# Patient Record
Sex: FEMALE | Race: WHITE | NOT HISPANIC OR LATINO | Employment: OTHER | URBAN - METROPOLITAN AREA
[De-identification: names, ages, dates, MRNs, and addresses within clinical notes are randomized per-mention and may not be internally consistent; named-entity substitution may affect disease eponyms.]

---

## 2021-09-14 ENCOUNTER — OFFICE VISIT (OUTPATIENT)
Dept: FAMILY MEDICINE CLINIC | Facility: CLINIC | Age: 71
End: 2021-09-14

## 2021-09-14 VITALS
BODY MASS INDEX: 30.67 KG/M2 | TEMPERATURE: 97.1 F | WEIGHT: 195.4 LBS | RESPIRATION RATE: 16 BRPM | HEART RATE: 75 BPM | DIASTOLIC BLOOD PRESSURE: 82 MMHG | HEIGHT: 67 IN | OXYGEN SATURATION: 99 % | SYSTOLIC BLOOD PRESSURE: 144 MMHG

## 2021-09-14 DIAGNOSIS — R61 DIAPHORESIS: ICD-10-CM

## 2021-09-14 DIAGNOSIS — R42 VERTIGO: ICD-10-CM

## 2021-09-14 DIAGNOSIS — E89.0 POSTOPERATIVE HYPOTHYROIDISM: Primary | ICD-10-CM

## 2021-09-14 DIAGNOSIS — R00.2 PALPITATIONS: ICD-10-CM

## 2021-09-14 LAB
ALBUMIN SERPL-MCNC: 4.5 G/DL (ref 3.5–5.2)
ALBUMIN/GLOB SERPL: 1.9 G/DL
ALP SERPL-CCNC: 86 U/L (ref 39–117)
ALT SERPL-CCNC: 14 U/L (ref 1–33)
AST SERPL-CCNC: 13 U/L (ref 1–32)
BASOPHILS # BLD AUTO: 0.12 10*3/MM3 (ref 0–0.2)
BASOPHILS NFR BLD AUTO: 1.6 % (ref 0–1.5)
BILIRUB SERPL-MCNC: 0.3 MG/DL (ref 0–1.2)
BUN SERPL-MCNC: 22 MG/DL (ref 8–23)
BUN/CREAT SERPL: 23.2 (ref 7–25)
CALCIUM SERPL-MCNC: 9.2 MG/DL (ref 8.6–10.5)
CHLORIDE SERPL-SCNC: 101 MMOL/L (ref 98–107)
CO2 SERPL-SCNC: 26.3 MMOL/L (ref 22–29)
CREAT SERPL-MCNC: 0.95 MG/DL (ref 0.57–1)
EOSINOPHIL # BLD AUTO: 0.04 10*3/MM3 (ref 0–0.4)
EOSINOPHIL NFR BLD AUTO: 0.5 % (ref 0.3–6.2)
ERYTHROCYTE [DISTWIDTH] IN BLOOD BY AUTOMATED COUNT: 12.2 % (ref 12.3–15.4)
GLOBULIN SER CALC-MCNC: 2.4 GM/DL
GLUCOSE SERPL-MCNC: 90 MG/DL (ref 65–99)
HCT VFR BLD AUTO: 41.5 % (ref 34–46.6)
HGB BLD-MCNC: 13.8 G/DL (ref 12–15.9)
IMM GRANULOCYTES # BLD AUTO: 0.04 10*3/MM3 (ref 0–0.05)
IMM GRANULOCYTES NFR BLD AUTO: 0.5 % (ref 0–0.5)
LYMPHOCYTES # BLD AUTO: 2.06 10*3/MM3 (ref 0.7–3.1)
LYMPHOCYTES NFR BLD AUTO: 27.3 % (ref 19.6–45.3)
MCH RBC QN AUTO: 28.7 PG (ref 26.6–33)
MCHC RBC AUTO-ENTMCNC: 33.3 G/DL (ref 31.5–35.7)
MCV RBC AUTO: 86.3 FL (ref 79–97)
MONOCYTES # BLD AUTO: 0.61 10*3/MM3 (ref 0.1–0.9)
MONOCYTES NFR BLD AUTO: 8.1 % (ref 5–12)
NEUTROPHILS # BLD AUTO: 4.67 10*3/MM3 (ref 1.7–7)
NEUTROPHILS NFR BLD AUTO: 62 % (ref 42.7–76)
NRBC BLD AUTO-RTO: 0 /100 WBC (ref 0–0.2)
PLATELET # BLD AUTO: 323 10*3/MM3 (ref 140–450)
POTASSIUM SERPL-SCNC: 4.5 MMOL/L (ref 3.5–5.2)
PROT SERPL-MCNC: 6.9 G/DL (ref 6–8.5)
RBC # BLD AUTO: 4.81 10*6/MM3 (ref 3.77–5.28)
SODIUM SERPL-SCNC: 140 MMOL/L (ref 136–145)
T4 FREE SERPL-MCNC: 1.78 NG/DL (ref 0.93–1.7)
T4 SERPL-MCNC: 11.3 MCG/DL (ref 4.5–11.7)
TSH SERPL DL<=0.005 MIU/L-ACNC: 0.06 UIU/ML (ref 0.27–4.2)
VIT B12 SERPL-MCNC: 637 PG/ML (ref 211–946)
WBC # BLD AUTO: 7.54 10*3/MM3 (ref 3.4–10.8)

## 2021-09-14 PROCEDURE — 99203 OFFICE O/P NEW LOW 30 MIN: CPT | Performed by: FAMILY MEDICINE

## 2021-09-14 RX ORDER — LEVOTHYROXINE SODIUM 0.05 MG/1
TABLET ORAL
COMMUNITY
Start: 2021-08-10 | End: 2021-09-19 | Stop reason: SDUPTHER

## 2021-09-14 RX ORDER — CITALOPRAM 20 MG/1
20 TABLET ORAL DAILY
COMMUNITY
Start: 2021-08-10 | End: 2021-12-10 | Stop reason: SDUPTHER

## 2021-09-14 RX ORDER — PRAVASTATIN SODIUM 40 MG
40 TABLET ORAL DAILY
COMMUNITY
Start: 2021-08-10 | End: 2021-09-27 | Stop reason: SDUPTHER

## 2021-09-14 RX ORDER — MELOXICAM 15 MG/1
15 TABLET ORAL DAILY
COMMUNITY
Start: 2021-08-16 | End: 2022-03-24 | Stop reason: SDUPTHER

## 2021-09-14 RX ORDER — LATANOPROST 50 UG/ML
1 SOLUTION/ DROPS OPHTHALMIC NIGHTLY
COMMUNITY

## 2021-09-14 RX ORDER — UBIDECARENONE 100 MG
100 CAPSULE ORAL DAILY
COMMUNITY
End: 2022-11-07

## 2021-09-14 NOTE — PROGRESS NOTES
Chief Complaint  Establish Care (NP to Hugo) and Dizziness (thurs started room spinning, has lightheadedness with quick movements)    Subjective          Stefani Watson presents to [facility]  History of Present Illness  About 5 days ago up that morning and said everything spinning around her.   She laid back down and up, went to rest room. Still spinning. Went to bath   Felt better but woozy, not spinning anymore.   Later in the day she reached down and had head rush, startled her and she got up real quick and   Discussed how she was feeling thought she might have been dehydrated. She drank water, gatorade. She says felt better, did electrolytes  Still not herself completely, weak and woozy a little. Not dizzy or spinning anymore.   Also noticed since not feeling as well she has had heart palpitations and when this happens doesn't last long, seconds but at the same time she has sweating in her neck.   Decided to walk yesterday 15 min to get fresh air.  She said tightness in the back of the neck, no pain.   Also has tightness in her sinuses/face. Says not really pain or a headache. She usually walks 30-45 minutes. She walks regularly but hasn't since this started. After the 15 minutes she had the sweating over her back of her neck, was hot outside she said, and tightness in her neck.     Each day she is feeling better. Still just a little weakness, says she can't lift as much or walk as far. She said when she showered had to hold on because she was lightheaded and concerned for falling.  Nothing like this has happened to her before other than when she had a sinus infection. She was told at that time she was out of it. She was evaluted at that time, years ago this episode, and was dx with sinus infection, no hx of vertigo.     Says she had a cold in July. Still coughing a little.   Had stapedectomy done over 20 years ago. She said did well for a long time and then had some trouble with  "hearing. No MRI because of metal in the ear.     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 She misses this here and there. Not so good at it. She does not miss her thyroid medication.   No longer seeing an endocrinologist. Last few years she was seeing . She has been on same dose for years.     Objective   Vital Signs:   /82 (BP Location: Right arm, Patient Position: Sitting, Cuff Size: Adult)   Pulse 75   Temp 97.1 °F (36.2 °C) (Infrared)   Resp 16   Ht 171.2 cm (67.4\")   Wt 88.6 kg (195 lb 6.4 oz)   SpO2 99%   BMI 30.24 kg/m²     Physical Exam  Vitals reviewed.   Constitutional:       General: She is not in acute distress.  Eyes:      General: No scleral icterus.        Right eye: No discharge.         Left eye: No discharge.      Conjunctiva/sclera: Conjunctivae normal.   Cardiovascular:      Rate and Rhythm: Normal rate and regular rhythm.      Heart sounds: Normal heart sounds. No murmur heard.     Pulmonary:      Effort: Pulmonary effort is normal. No respiratory distress.      Breath sounds: Normal breath sounds. No wheezing.   Musculoskeletal:      Cervical back: Neck supple. No muscular tenderness.      Right lower leg: No edema.      Left lower leg: No edema.   Lymphadenopathy:      Cervical: No cervical adenopathy.   Neurological:      General: No focal deficit present.      Mental Status: She is oriented to person, place, and time.      Motor: No abnormal muscle tone.      Comments: Up out of the chair with no difficulty. Required no assistance to get up onto the exam table. No dizziness with this or time of exam.    Psychiatric:         Behavior: Behavior normal.        Result Review :                 Assessment and Plan    Diagnoses and all orders for this visit:    1. Postoperative hypothyroidism (Primary)  -     TSH  -     T4  -     T4, Free    2. Palpitations  -     Holter Monitor - 72 Hour Up To 15 Days; Future  -     Adult Transthoracic Echo Complete W/ Cont " if Necessary Per Protocol; Future  -     Ambulatory Referral to Cardiology  -     CBC & Differential  -     Comprehensive Metabolic Panel  -     Vitamin B12    3. Vertigo  -     Holter Monitor - 72 Hour Up To 15 Days; Future  -     Ambulatory Referral to Cardiology  -     Comprehensive Metabolic Panel  -     Vitamin B12    4. Diaphoresis  -     Holter Monitor - 72 Hour Up To 15 Days; Future  -     Ambulatory Referral to Cardiology  -     TSH  -     Vitamin B12        Follow Up   No follow-ups on file.  Patient was given instructions and counseling regarding her condition or for health maintenance advice. Please see specific information pulled into the AVS if appropriate.     She has new onset what sounds like vertigo, unsure of etiology. She does have some small tympanic effusions and history significant for allergic rhinitis and sinus infections. She is having some sinus issues. No prior hx of vertigo. From her history overall brief episode and does sound positional.       She has her prior labs and reviewed.     With her symptoms I recommend some cardiac evaluation, labs today and referral to cardiology.   She has follow up already set up. Was supposed to be her initial appt with me but then had these symptoms and in here sooner. We will keep this appointment for close follow up.

## 2021-09-19 RX ORDER — LEVOTHYROXINE SODIUM 0.12 MG/1
125 TABLET ORAL
Qty: 90 TABLET | Refills: 0 | Status: SHIPPED | OUTPATIENT
Start: 2021-09-19 | End: 2021-12-15

## 2021-09-27 ENCOUNTER — OFFICE VISIT (OUTPATIENT)
Dept: FAMILY MEDICINE CLINIC | Facility: CLINIC | Age: 71
End: 2021-09-27

## 2021-09-27 VITALS
DIASTOLIC BLOOD PRESSURE: 84 MMHG | HEART RATE: 67 BPM | HEIGHT: 67 IN | TEMPERATURE: 97.5 F | WEIGHT: 200.7 LBS | BODY MASS INDEX: 31.5 KG/M2 | OXYGEN SATURATION: 98 % | SYSTOLIC BLOOD PRESSURE: 134 MMHG | RESPIRATION RATE: 16 BRPM

## 2021-09-27 DIAGNOSIS — E78.00 PURE HYPERCHOLESTEROLEMIA: ICD-10-CM

## 2021-09-27 DIAGNOSIS — E89.0 POSTOPERATIVE HYPOTHYROIDISM: ICD-10-CM

## 2021-09-27 DIAGNOSIS — G89.29 CHRONIC RIGHT-SIDED LOW BACK PAIN WITH RIGHT-SIDED SCIATICA: Primary | ICD-10-CM

## 2021-09-27 DIAGNOSIS — M16.11 PRIMARY OSTEOARTHRITIS OF RIGHT HIP: ICD-10-CM

## 2021-09-27 DIAGNOSIS — M54.41 CHRONIC RIGHT-SIDED LOW BACK PAIN WITH RIGHT-SIDED SCIATICA: Primary | ICD-10-CM

## 2021-09-27 PROBLEM — F32.A DEPRESSIVE DISORDER: Status: ACTIVE | Noted: 2021-09-27

## 2021-09-27 PROBLEM — K57.30 DIVERTICULAR DISEASE OF COLON: Status: ACTIVE | Noted: 2021-09-27

## 2021-09-27 PROBLEM — M19.90 OSTEOARTHRITIS: Status: ACTIVE | Noted: 2021-09-27

## 2021-09-27 PROBLEM — H91.90 HEARING LOSS: Status: ACTIVE | Noted: 2021-09-27

## 2021-09-27 PROBLEM — C73 PRIMARY MALIGNANT NEOPLASM OF THYROID GLAND: Status: ACTIVE | Noted: 2021-09-27

## 2021-09-27 PROCEDURE — 99214 OFFICE O/P EST MOD 30 MIN: CPT | Performed by: FAMILY MEDICINE

## 2021-09-27 RX ORDER — PRAVASTATIN SODIUM 40 MG
40 TABLET ORAL DAILY
Qty: 90 TABLET | Refills: 1 | Status: SHIPPED | OUTPATIENT
Start: 2021-09-27 | End: 2021-11-10 | Stop reason: SDUPTHER

## 2021-09-27 RX ORDER — CALCITRIOL 0.25 UG/1
CAPSULE, LIQUID FILLED ORAL
COMMUNITY
End: 2022-03-24 | Stop reason: SDUPTHER

## 2021-09-27 NOTE — PROGRESS NOTES
"Chief Complaint  Dizziness (f/u)    Subjective          Stefani Watson presents to [facility]  History of Present Illness  She started the lower dose   Not dizzy since. Feeling better.   Still palpitations. Happen at rest or if she gets excited  She still has sinus congestion and using flonase  She is a little back to her walking. Has gone a few days.   One day this week she did a 30 min walk, felt ok.   She has a tightness in her neck when she is walking and has music playing in her ears. Doesn't get that other time.    HLD  She had increase in her dose from 20 to 40 mg in May after her labs. She was double on her 20 and ran out. Did not take for a while. She then got it refilled from old PCP. Has been taking the 40 mg.     Chronic low back pain and hip pain,   Arthritis. Takes mobic, trying not to use regularly but when off has a lot more pain, right side is the worst. Wraps around to the groin and goes down into her right buttock.     Objective   Vital Signs:   /84 (BP Location: Right arm, Patient Position: Sitting, Cuff Size: Adult)   Pulse 67   Temp 97.5 °F (36.4 °C) (Infrared)   Resp 16   Ht 171.2 cm (67.4\")   Wt 91 kg (200 lb 11.2 oz)   SpO2 98%   BMI 31.06 kg/m²     Physical Exam  Vitals reviewed.   Constitutional:       General: She is not in acute distress.  HENT:      Right Ear: Tympanic membrane, ear canal and external ear normal. There is no impacted cerumen.      Left Ear: Tympanic membrane, ear canal and external ear normal. There is no impacted cerumen.      Ears:      Comments: Arching canal on the right. Well healed TM surgical scar posteriorly.   Eyes:      General: No scleral icterus.        Right eye: No discharge.         Left eye: No discharge.      Conjunctiva/sclera: Conjunctivae normal.   Cardiovascular:      Rate and Rhythm: Normal rate and regular rhythm.      Heart sounds: Normal heart sounds. No murmur heard.        Comments: Premature beats x 2 " heard during auscultation, most RRR.  Pulmonary:      Effort: Pulmonary effort is normal. No respiratory distress.      Breath sounds: Normal breath sounds. No wheezing.   Musculoskeletal:      Cervical back: Neck supple. No tenderness. No muscular tenderness.      Right lower leg: No edema.      Left lower leg: No edema.      Comments: She does have spasm palpable over superior trapezius right greater than left. No tenderness on the neck.    Lymphadenopathy:      Cervical: No cervical adenopathy.   Neurological:      Mental Status: She is oriented to person, place, and time.      Motor: No abnormal muscle tone.   Psychiatric:         Behavior: Behavior normal.        Result Review :                 Assessment and Plan    Diagnoses and all orders for this visit:    1. Chronic right-sided low back pain with right-sided sciatica (Primary)  -     Ambulatory Referral to Physical Therapy Evaluate and treat    2. Primary osteoarthritis of right hip  -     Ambulatory Referral to Physical Therapy Evaluate and treat    3. Pure hypercholesterolemia    4. Postoperative hypothyroidism  -     TSH; Future  -     T4; Future  -     T4, Free; Future    Other orders  -     pravastatin (PRAVACHOL) 40 MG tablet; Take 1 tablet by mouth Daily.  Dispense: 90 tablet; Refill: 1        Follow Up   No follow-ups on file.  Patient was given instructions and counseling regarding her condition or for health maintenance advice. Please see specific information pulled into the AVS if appropriate.     She would like to do PT for her back and hips. She had been doing before moved and she is less active than she had been since moving here. She had walking friend in [place] and walked sometimes an hour per day. She is doing 3 times per week 20 min. Outside.   She has pain when she stops her meloxicam.   She tries not to take it all the time related to kidney concerns.     She did have change in her pravastatin dose. Was off for short period, on her higher  dose for less than one month. Discussed we will refill and then have her back in a few months to check. Her last LDL was 166.     Having palpitations intermittently at rest. No associated symptoms except sometimes nervous.   She is doing well with her walking, not as often and not as long but walking again. She is set for echo and holter 9/30 and f/u with cards early October    She did change to the lower levothyroxine dose 1 week ago. We will check labs in three weeks.

## 2021-09-29 ENCOUNTER — TELEPHONE (OUTPATIENT)
Dept: FAMILY MEDICINE CLINIC | Facility: CLINIC | Age: 71
End: 2021-09-29

## 2021-10-11 DIAGNOSIS — E89.0 POSTOPERATIVE HYPOTHYROIDISM: ICD-10-CM

## 2021-10-19 ENCOUNTER — HOSPITAL ENCOUNTER (OUTPATIENT)
Dept: CARDIOLOGY | Facility: HOSPITAL | Age: 71
Discharge: HOME | End: 2021-10-19
Admitting: FAMILY MEDICINE

## 2021-10-19 VITALS
DIASTOLIC BLOOD PRESSURE: 80 MMHG | SYSTOLIC BLOOD PRESSURE: 152 MMHG | HEART RATE: 58 BPM | WEIGHT: 200 LBS | BODY MASS INDEX: 31.39 KG/M2 | HEIGHT: 67 IN

## 2021-10-19 DIAGNOSIS — R00.2 PALPITATIONS: ICD-10-CM

## 2021-10-19 LAB
T4 FREE SERPL-MCNC: 1.67 NG/DL (ref 0.82–1.77)
T4 SERPL-MCNC: 10.2 UG/DL (ref 4.5–12)
TSH SERPL DL<=0.005 MIU/L-ACNC: 0.08 UIU/ML (ref 0.45–4.5)

## 2021-10-19 PROCEDURE — 93306 TTE W/DOPPLER COMPLETE: CPT | Performed by: INTERNAL MEDICINE

## 2021-10-19 PROCEDURE — 93306 TTE W/DOPPLER COMPLETE: CPT

## 2021-10-20 LAB
AORTIC ARCH: 2.5 CM
ASCENDING AORTA: 2.9 CM
BH CV ECHO MEAS - ACS: 2.1 CM
BH CV ECHO MEAS - AO MAX PG (FULL): 0.28 MMHG
BH CV ECHO MEAS - AO MAX PG: 5.6 MMHG
BH CV ECHO MEAS - AO MEAN PG (FULL): 1 MMHG
BH CV ECHO MEAS - AO MEAN PG: 3 MMHG
BH CV ECHO MEAS - AO ROOT AREA (BSA CORRECTED): 1.6
BH CV ECHO MEAS - AO ROOT AREA: 8.6 CM^2
BH CV ECHO MEAS - AO ROOT DIAM: 3.3 CM
BH CV ECHO MEAS - AO V2 MAX: 118 CM/SEC
BH CV ECHO MEAS - AO V2 MEAN: 81.1 CM/SEC
BH CV ECHO MEAS - AO V2 VTI: 27 CM
BH CV ECHO MEAS - ASC AORTA: 2.9 CM
BH CV ECHO MEAS - AVA(I,A): 2.3 CM^2
BH CV ECHO MEAS - AVA(I,D): 2.3 CM^2
BH CV ECHO MEAS - AVA(V,A): 2.8 CM^2
BH CV ECHO MEAS - AVA(V,D): 2.8 CM^2
BH CV ECHO MEAS - BSA(HAYCOCK): 2.1 M^2
BH CV ECHO MEAS - BSA: 2 M^2
BH CV ECHO MEAS - BZI_BMI: 31.3 KILOGRAMS/M^2
BH CV ECHO MEAS - BZI_METRIC_HEIGHT: 170.2 CM
BH CV ECHO MEAS - BZI_METRIC_WEIGHT: 90.7 KG
BH CV ECHO MEAS - EDV(CUBED): 46.7 ML
BH CV ECHO MEAS - EDV(MOD-SP2): 60 ML
BH CV ECHO MEAS - EDV(MOD-SP4): 54 ML
BH CV ECHO MEAS - EDV(TEICH): 54.4 ML
BH CV ECHO MEAS - EF(CUBED): 66.5 %
BH CV ECHO MEAS - EF(MOD-BP): 61.8 %
BH CV ECHO MEAS - EF(MOD-SP2): 68.3 %
BH CV ECHO MEAS - EF(MOD-SP4): 61.1 %
BH CV ECHO MEAS - EF(TEICH): 59 %
BH CV ECHO MEAS - ESV(CUBED): 15.6 ML
BH CV ECHO MEAS - ESV(MOD-SP2): 19 ML
BH CV ECHO MEAS - ESV(MOD-SP4): 21 ML
BH CV ECHO MEAS - ESV(TEICH): 22.3 ML
BH CV ECHO MEAS - FS: 30.6 %
BH CV ECHO MEAS - IVS/LVPW: 1.2
BH CV ECHO MEAS - IVSD: 1.3 CM
BH CV ECHO MEAS - LAT PEAK E' VEL: 7.5 CM/SEC
BH CV ECHO MEAS - LV DIASTOLIC VOL/BSA (35-75): 26.7 ML/M^2
BH CV ECHO MEAS - LV MASS(C)D: 141.5 GRAMS
BH CV ECHO MEAS - LV MASS(C)DI: 70 GRAMS/M^2
BH CV ECHO MEAS - LV MAX PG: 5.3 MMHG
BH CV ECHO MEAS - LV MEAN PG: 2 MMHG
BH CV ECHO MEAS - LV SYSTOLIC VOL/BSA (12-30): 10.4 ML/M^2
BH CV ECHO MEAS - LV V1 MAX: 115 CM/SEC
BH CV ECHO MEAS - LV V1 MEAN: 65.3 CM/SEC
BH CV ECHO MEAS - LV V1 VTI: 21.8 CM
BH CV ECHO MEAS - LVIDD: 3.6 CM
BH CV ECHO MEAS - LVIDS: 2.5 CM
BH CV ECHO MEAS - LVLD AP2: 6.7 CM
BH CV ECHO MEAS - LVLD AP4: 6.6 CM
BH CV ECHO MEAS - LVLS AP2: 4.7 CM
BH CV ECHO MEAS - LVLS AP4: 5.6 CM
BH CV ECHO MEAS - LVOT AREA (M): 2.8 CM^2
BH CV ECHO MEAS - LVOT AREA: 2.8 CM^2
BH CV ECHO MEAS - LVOT DIAM: 1.9 CM
BH CV ECHO MEAS - LVPWD: 1.1 CM
BH CV ECHO MEAS - MED PEAK E' VEL: 6.2 CM/SEC
BH CV ECHO MEAS - MV A DUR: 0.11 SEC
BH CV ECHO MEAS - MV A MAX VEL: 94.3 CM/SEC
BH CV ECHO MEAS - MV DEC SLOPE: 260 CM/SEC^2
BH CV ECHO MEAS - MV DEC TIME: 0.23 SEC
BH CV ECHO MEAS - MV E MAX VEL: 52.6 CM/SEC
BH CV ECHO MEAS - MV E/A: 0.56
BH CV ECHO MEAS - MV MAX PG: 3.5 MMHG
BH CV ECHO MEAS - MV MEAN PG: 1 MMHG
BH CV ECHO MEAS - MV P1/2T MAX VEL: 63.4 CM/SEC
BH CV ECHO MEAS - MV P1/2T: 71.4 MSEC
BH CV ECHO MEAS - MV V2 MAX: 94 CM/SEC
BH CV ECHO MEAS - MV V2 MEAN: 45.9 CM/SEC
BH CV ECHO MEAS - MV V2 VTI: 22.3 CM
BH CV ECHO MEAS - MVA P1/2T LCG: 3.5 CM^2
BH CV ECHO MEAS - MVA(P1/2T): 3.1 CM^2
BH CV ECHO MEAS - MVA(VTI): 2.8 CM^2
BH CV ECHO MEAS - PA ACC TIME: 0.15 SEC
BH CV ECHO MEAS - PA MAX PG (FULL): 0.98 MMHG
BH CV ECHO MEAS - PA MAX PG: 2.7 MMHG
BH CV ECHO MEAS - PA PR(ACCEL): 11.1 MMHG
BH CV ECHO MEAS - PA V2 MAX: 81.4 CM/SEC
BH CV ECHO MEAS - PULM A REVS DUR: 0.14 SEC
BH CV ECHO MEAS - PULM A REVS VEL: 36 CM/SEC
BH CV ECHO MEAS - PULM DIAS VEL: 34.4 CM/SEC
BH CV ECHO MEAS - PULM S/D: 1.4
BH CV ECHO MEAS - PULM SYS VEL: 48.8 CM/SEC
BH CV ECHO MEAS - RAP SYSTOLE: 3 MMHG
BH CV ECHO MEAS - RV MAX PG: 1.7 MMHG
BH CV ECHO MEAS - RV MEAN PG: 1 MMHG
BH CV ECHO MEAS - RV V1 MAX: 64.6 CM/SEC
BH CV ECHO MEAS - RV V1 MEAN: 45.2 CM/SEC
BH CV ECHO MEAS - RV V1 VTI: 16.4 CM
BH CV ECHO MEAS - RVSP: 19 MMHG
BH CV ECHO MEAS - SI(AO): 114.2 ML/M^2
BH CV ECHO MEAS - SI(CUBED): 15.3 ML/M^2
BH CV ECHO MEAS - SI(LVOT): 30.6 ML/M^2
BH CV ECHO MEAS - SI(MOD-SP2): 20.3 ML/M^2
BH CV ECHO MEAS - SI(MOD-SP4): 16.3 ML/M^2
BH CV ECHO MEAS - SI(TEICH): 15.9 ML/M^2
BH CV ECHO MEAS - SUP REN AO DIAM: 2.1 CM
BH CV ECHO MEAS - SV(AO): 230.9 ML
BH CV ECHO MEAS - SV(CUBED): 31 ML
BH CV ECHO MEAS - SV(LVOT): 61.8 ML
BH CV ECHO MEAS - SV(MOD-SP2): 41 ML
BH CV ECHO MEAS - SV(MOD-SP4): 33 ML
BH CV ECHO MEAS - SV(TEICH): 32.1 ML
BH CV ECHO MEAS - TAPSE (>1.6): 1.7 CM
BH CV ECHO MEAS - TR MAX VEL: 198 CM/SEC
BH CV ECHO MEASUREMENTS AVERAGE E/E' RATIO: 7.68
BH CV XLRA - RV BASE: 2.5 CM
BH CV XLRA - RV LENGTH: 4.9 CM
BH CV XLRA - RV MID: 2.5 CM
BH CV XLRA - TDI S': 7.8 CM/SEC
LEFT ATRIUM VOLUME INDEX: 26 ML/M2
LV EF 2D ECHO EST: 62 %
MAXIMAL PREDICTED HEART RATE: 147 BPM
SINUS: 3 CM
STJ: 2 CM
STRESS TARGET HR: 125 BPM

## 2021-11-03 ENCOUNTER — OFFICE VISIT (OUTPATIENT)
Dept: CARDIOLOGY | Facility: CLINIC | Age: 71
End: 2021-11-03

## 2021-11-03 VITALS
SYSTOLIC BLOOD PRESSURE: 142 MMHG | BODY MASS INDEX: 31.89 KG/M2 | HEIGHT: 67 IN | DIASTOLIC BLOOD PRESSURE: 70 MMHG | WEIGHT: 203.2 LBS | HEART RATE: 68 BPM

## 2021-11-03 DIAGNOSIS — E78.00 PURE HYPERCHOLESTEROLEMIA: ICD-10-CM

## 2021-11-03 DIAGNOSIS — R00.2 PALPITATIONS: ICD-10-CM

## 2021-11-03 DIAGNOSIS — R42 DIZZINESS: Primary | ICD-10-CM

## 2021-11-03 DIAGNOSIS — E89.0 POSTOPERATIVE HYPOTHYROIDISM: ICD-10-CM

## 2021-11-03 PROCEDURE — 93000 ELECTROCARDIOGRAM COMPLETE: CPT | Performed by: INTERNAL MEDICINE

## 2021-11-03 PROCEDURE — 99203 OFFICE O/P NEW LOW 30 MIN: CPT | Performed by: INTERNAL MEDICINE

## 2021-11-03 NOTE — PROGRESS NOTES
Date of Office Visit: 2021  Encounter Provider: Lorenza Finney MD  Place of Service: [facility]  Patient Name: Stefani Watson  :1950      Patient ID:  Stefani Watson is a 71 y.o. female is here for palpitations and dizziness.      History of Present Illness    She has a history of thyroid cancer treated by [id number] resulting in hypothyroidism for which she is on thyroid replacement.  She has a history of hyperlipidemia on pravastatin.  She is here today for dizziness and palpitations.    She has a family history of heart disease with her mother and her sister had cancer.    She is , has 1 child, is retired, has never smoked, has 3 to 4 cups of coffee per day and occasional alcohol.  She moved here from [place] in May 2021, she was about 1 hour north of [place].    Labs done 2021 show unremarkable CMP, normal CBC.  She is had a abnormal thyroid panel done twice, once on what 2021 and second time on 10/18/2021.  On 10/18/2021, her TSH was low at 0.083 with a total T4 that is normal and a normal free T4.  She had a normal echocardiogram done 10/19/2021.  She wore 2-week Holter monitor which got turned in on 2021, I do not have the results of that.    She describes a spinning sensation that happened right around 2021.  She woke in the morning and the room was spinning.  She then noticed that her heart was also palpitating and she was having short bursts of a fast heart rhythm.  The same day that she first noticed this, she went to her kitchen and she was feeling better and bent over and the spinning sensation came back and she nearly passed out.  That is when she was seen.  At that time to discover that her thyroid was overtreated and her medication was changed and she is felt much better since then.  She exercises walking about a mile daily and feels well with that.  She sometimes gets some pain in the back of her neck with walking  but is not consistent.  She occasionally gets some left-sided chest heaviness but it is very random and does not occur very often.  She has had no orthopnea or PND.  She has no exertional dyspnea.  She does have some aches and pains with arthritis but overall tolerates her pravastatin well.  She is not had any check of her lipids since she moved from [place].  He does all own activities of daily living including laundry, cooking, cleaning, driving.    Past Medical History:   Diagnosis Date   • HL (hearing loss)    • Hyperlipidemia    • Hypothyroidism     Thyroid removed   • Thyroid cancer (HCC)          Past Surgical History:   Procedure Laterality Date   • CATARACT EXTRACTION, BILATERAL     • EYE SURGERY      Cataracts   • LAPAROSCOPIC TUBAL LIGATION     • ROTATOR CUFF REPAIR Left    • STAPEDECTOMY Right    • THYROIDECTOMY     • TUBAL ABDOMINAL LIGATION         Current Outpatient Medications on File Prior to Visit   Medication Sig Dispense Refill   • calcitriol (ROCALTROL) 0.25 MCG capsule calcitriol 0.25 mcg capsule     • citalopram (CeleXA) 20 MG tablet Take 20 mg by mouth Daily.     • coenzyme Q10 100 MG capsule Take 100 mg by mouth Daily.     • latanoprost (XALATAN) 0.005 % ophthalmic solution USE 1 DROP IN BOTH EYES AT BEDTIME     • levothyroxine (SYNTHROID, LEVOTHROID) 125 MCG tablet Take 1 tablet by mouth Every Morning. 90 tablet 0   • MAGNESIUM OXIDE PO Take 64 mg by mouth Daily. Takes 4 tablets daily     • meloxicam (MOBIC) 15 MG tablet Take 15 mg by mouth Daily. with food     • pravastatin (PRAVACHOL) 40 MG tablet Take 1 tablet by mouth Daily. 90 tablet 1     No current facility-administered medications on file prior to visit.       Social History     Socioeconomic History   • Marital status:    Tobacco Use   • Smoking status: Never Smoker   • Smokeless tobacco: Never Used   Substance and Sexual Activity   • Alcohol use: Yes     Alcohol/week: 0.0 standard drinks     Comment: occ//Caffeine use: 3-4  "cups daily    • Drug use: Never   • Sexual activity: Not Currently           ROS    Procedures    ECG 12 Lead    Date/Time: 11/3/2021 9:22 AM  Performed by: Lorenza Finney MD  Authorized by: Lorenza Finney MD   Comparison: compared with previous ECG   Similar to previous ECG  Rhythm: sinus rhythm    Clinical impression: normal ECG                Objective:      Vitals:    11/03/21 0905 11/03/21 0908   BP: 136/70 142/70   BP Location: Left arm Right arm   Pulse: 68    Weight: 92.2 kg (203 lb 3.2 oz)    Height: 170.2 cm (67\")      Body mass index is 31.83 kg/m².    Vitals reviewed.   Constitutional:       General: Not in acute distress.     Appearance: Well-developed. Not diaphoretic.   Eyes:      General: No scleral icterus.     Conjunctiva/sclera: Conjunctivae normal.   HENT:      Head: Normocephalic and atraumatic.   Neck:      Thyroid: No thyromegaly.      Vascular: No carotid bruit or JVD.      Lymphadenopathy: No cervical adenopathy.   Pulmonary:      Effort: Pulmonary effort is normal. No respiratory distress.      Breath sounds: Normal breath sounds. No wheezing. No rhonchi. No rales.   Chest:      Chest wall: Not tender to palpatation.   Cardiovascular:      Normal rate. Regular rhythm.      Murmurs: There is no murmur.      No gallop.   Pulses:     Intact distal pulses.   Edema:     Peripheral edema absent.   Abdominal:      General: Bowel sounds are normal. There is no distension or abdominal bruit.      Palpations: Abdomen is soft. There is no abdominal mass.      Tenderness: There is no abdominal tenderness.   Musculoskeletal:         General: No deformity.      Extremities: No clubbing present.     Cervical back: Neck supple. Skin:     General: Skin is warm and dry. There is no cyanosis.      Coloration: Skin is not pale.      Findings: No rash.   Neurological:      Mental Status: Alert and oriented to person, place, and time.      Cranial Nerves: No cranial nerve deficit.   Psychiatric:    "      Judgment: Judgment normal.         Lab Review:       Assessment:      Diagnosis Plan   1. Dizziness     2. Pure hypercholesterolemia     3. Postoperative hypothyroidism     4. Palpitations       1. Palpitations and short bursts of tachycardia, await monitor.  Echo was normal and EKG today is also normal.  Did get better when her thyroid dosing was adjusted.  2. Hyperlipidemia, on pravastatin, needs her lipids rechecked  3. History of thyroid cancer, status post iodine 131 treatment.  On thyroid replacement     Plan:       Advised vascular screening, no other changes to medications made, advised to get her lipids checked.  No other testing needed at this time.  If her monitor looks normal, she will not need follow-up care.  Overall I think she is doing well.

## 2021-11-10 NOTE — TELEPHONE ENCOUNTER
Caller: Stefani Watson    Relationship: Self    Best call back number: [phone] (H)    Requested Prescriptions:   Requested Prescriptions     Pending Prescriptions Disp Refills   • pravastatin (PRAVACHOL) 40 MG tablet 90 tablet 1     Sig: Take 1 tablet by mouth Daily.        Pharmacy where request should be sent: [facility] #[id number] [address] AT [facility] [phone] [facility] [phone]      Additional details provided by patient: PATIENT STATES WOULD LIKE TO RECEIVE A 90 DAY SUPPLY    Does the patient have less than a 3 day supply:  [] Yes  [x] No    Patito Pickens Rep   11/10/21 11:48 EST

## 2021-11-12 RX ORDER — PRAVASTATIN SODIUM 40 MG
40 TABLET ORAL DAILY
Qty: 90 TABLET | Refills: 3 | Status: SHIPPED | OUTPATIENT
Start: 2021-11-12 | End: 2022-11-01

## 2021-11-12 NOTE — TELEPHONE ENCOUNTER
Rx Refill Note  Requested Prescriptions     Pending Prescriptions Disp Refills   • pravastatin (PRAVACHOL) 40 MG tablet 90 tablet 1     Sig: Take 1 tablet by mouth Daily.      Last office visit with prescribing clinician: 9/27/2021      Next office visit with prescribing clinician: 1/28/2022            Maryan Aguirre LPN  11/12/21, 07:46 EST

## 2021-12-10 ENCOUNTER — TELEPHONE (OUTPATIENT)
Dept: FAMILY MEDICINE CLINIC | Facility: CLINIC | Age: 71
End: 2021-12-10

## 2021-12-10 NOTE — TELEPHONE ENCOUNTER
.    Caller: Shirley Stefani    Relationship: Self    Best call back number: [phone]    Requested Prescriptions:   Requested Prescriptions     Pending Prescriptions Disp Refills   • citalopram (CeleXA) 20 MG tablet       Sig: Take 1 tablet by mouth Daily.        Pharmacy where request should be sent:    [facility] #[id number] [facility] [id number] [place] [address] AT [facility] - [phone] [place] [phone] FX  [phone]    Does the patient have less than a 3 day supply:  [] Yes  [x] No    Patito Catherine Rep   12/10/21 13:20 EST

## 2021-12-11 RX ORDER — CITALOPRAM 20 MG/1
20 TABLET ORAL DAILY
Qty: 90 TABLET | Refills: 1 | Status: SHIPPED | OUTPATIENT
Start: 2021-12-11 | End: 2022-06-03

## 2021-12-15 RX ORDER — LEVOTHYROXINE SODIUM 0.12 MG/1
125 TABLET ORAL
Qty: 90 TABLET | Refills: 1 | Status: SHIPPED | OUTPATIENT
Start: 2021-12-15 | End: 2022-06-08

## 2021-12-15 NOTE — TELEPHONE ENCOUNTER
Rx Refill Note  Requested Prescriptions     Pending Prescriptions Disp Refills   • levothyroxine (SYNTHROID, LEVOTHROID) 125 MCG tablet [Pharmacy Med Name: LEVOTHYROXINE 0.125MG (125MCG) TAB] 90 tablet 0     Sig: TAKE 1 TABLET BY MOUTH EVERY MORNING      Last office visit with prescribing clinician: 9/27/2021      Next office visit with prescribing clinician: 1/28/2022            Maryan Aguirre LPN  12/14/21, 19:00 EST

## 2022-01-28 ENCOUNTER — OFFICE VISIT (OUTPATIENT)
Dept: FAMILY MEDICINE CLINIC | Facility: CLINIC | Age: 72
End: 2022-01-28

## 2022-01-28 VITALS
SYSTOLIC BLOOD PRESSURE: 132 MMHG | TEMPERATURE: 97.7 F | HEART RATE: 77 BPM | HEIGHT: 67 IN | DIASTOLIC BLOOD PRESSURE: 72 MMHG | OXYGEN SATURATION: 99 % | WEIGHT: 206 LBS | RESPIRATION RATE: 18 BRPM | BODY MASS INDEX: 32.33 KG/M2

## 2022-01-28 DIAGNOSIS — Z12.31 ENCOUNTER FOR SCREENING MAMMOGRAM FOR MALIGNANT NEOPLASM OF BREAST: ICD-10-CM

## 2022-01-28 DIAGNOSIS — N83.201 CYST OF RIGHT OVARY: ICD-10-CM

## 2022-01-28 DIAGNOSIS — Z09 ENCOUNTER FOR FOLLOW-UP EXAMINATION AFTER COMPLETED TREATMENT FOR CONDITIONS OTHER THAN MALIGNANT NEOPLASM: ICD-10-CM

## 2022-01-28 DIAGNOSIS — E89.0 POSTOPERATIVE HYPOTHYROIDISM: ICD-10-CM

## 2022-01-28 DIAGNOSIS — E78.00 PURE HYPERCHOLESTEROLEMIA: Primary | ICD-10-CM

## 2022-01-28 PROCEDURE — 99214 OFFICE O/P EST MOD 30 MIN: CPT | Performed by: FAMILY MEDICINE

## 2022-01-28 RX ORDER — AMOXICILLIN 875 MG/1
TABLET, COATED ORAL
COMMUNITY
Start: 2022-01-26 | End: 2022-04-20

## 2022-01-29 LAB
BUN SERPL-MCNC: 13 MG/DL (ref 8–27)
BUN/CREAT SERPL: 14 (ref 12–28)
CALCIUM SERPL-MCNC: 7.9 MG/DL (ref 8.7–10.3)
CHLORIDE SERPL-SCNC: 104 MMOL/L (ref 96–106)
CHOLEST SERPL-MCNC: 192 MG/DL (ref 100–199)
CO2 SERPL-SCNC: 22 MMOL/L (ref 20–29)
CREAT SERPL-MCNC: 0.93 MG/DL (ref 0.57–1)
GLUCOSE SERPL-MCNC: 86 MG/DL (ref 65–99)
HDLC SERPL-MCNC: 43 MG/DL
LDLC SERPL CALC-MCNC: 133 MG/DL (ref 0–99)
POTASSIUM SERPL-SCNC: 4.2 MMOL/L (ref 3.5–5.2)
SODIUM SERPL-SCNC: 142 MMOL/L (ref 134–144)
T4 FREE SERPL-MCNC: 1.81 NG/DL (ref 0.82–1.77)
T4 SERPL-MCNC: 10.6 UG/DL (ref 4.5–12)
TRIGL SERPL-MCNC: 85 MG/DL (ref 0–149)
TSH SERPL-ACNC: 0.11 UIU/ML (ref 0.45–4.5)
VLDLC SERPL CALC-MCNC: 16 MG/DL (ref 5–40)

## 2022-02-01 ENCOUNTER — TELEPHONE (OUTPATIENT)
Dept: FAMILY MEDICINE CLINIC | Facility: CLINIC | Age: 72
End: 2022-02-01

## 2022-02-01 DIAGNOSIS — M54.41 CHRONIC RIGHT-SIDED LOW BACK PAIN WITH RIGHT-SIDED SCIATICA: Primary | ICD-10-CM

## 2022-02-01 DIAGNOSIS — M16.11 PRIMARY OSTEOARTHRITIS OF RIGHT HIP: ICD-10-CM

## 2022-02-01 DIAGNOSIS — G89.29 CHRONIC RIGHT-SIDED LOW BACK PAIN WITH RIGHT-SIDED SCIATICA: Primary | ICD-10-CM

## 2022-02-01 NOTE — TELEPHONE ENCOUNTER
Caller:     Relationship:     Best call back number:    Stefani Watson (Self) [phone] (H)         What is the medical concern/diagnosis:     What specialty or service is being requested: PHYSICAL THERAPY     [facility]  [facility]     [facility]ITTY DOES NOT TAKE HER INSURANCE     What is the provider, practice or medical service name:     What is the office location:     What is the office phone number: PHONE:  [phone]  FAX:  [phone]     Any additional details:

## 2022-02-04 ENCOUNTER — TRANSCRIBE ORDERS (OUTPATIENT)
Dept: ADMINISTRATIVE | Facility: HOSPITAL | Age: 72
End: 2022-02-04

## 2022-02-04 DIAGNOSIS — Z12.31 VISIT FOR SCREENING MAMMOGRAM: Primary | ICD-10-CM

## 2022-02-10 ENCOUNTER — TELEPHONE (OUTPATIENT)
Dept: FAMILY MEDICINE CLINIC | Facility: CLINIC | Age: 72
End: 2022-02-10

## 2022-02-10 DIAGNOSIS — E89.0 POSTOPERATIVE HYPOTHYROIDISM: Primary | ICD-10-CM

## 2022-02-10 NOTE — TELEPHONE ENCOUNTER
Notation is from questions Hugo had wanted this nurse to ask patient. Pt states she is not sure when she had her last colonoscopy done. States should be in the paperwork that she brought in with her visit. Unsure of provider for the colonoscopy  And it was done a [facility].

## 2022-02-11 NOTE — TELEPHONE ENCOUNTER
I looked through her paperwork and I didn't see colonoscopy details. If she could find any further information for the chart it would be helpful for keeping track. Thanks.

## 2022-02-14 ENCOUNTER — LAB (OUTPATIENT)
Dept: LAB | Facility: HOSPITAL | Age: 72
End: 2022-02-14

## 2022-02-14 ENCOUNTER — HOSPITAL ENCOUNTER (OUTPATIENT)
Dept: MAMMOGRAPHY | Facility: HOSPITAL | Age: 72
Discharge: HOME | End: 2022-02-14

## 2022-02-14 ENCOUNTER — HOSPITAL ENCOUNTER (OUTPATIENT)
Dept: ULTRASOUND IMAGING | Facility: HOSPITAL | Age: 72
Discharge: HOME | End: 2022-02-14

## 2022-02-14 DIAGNOSIS — Z09 ENCOUNTER FOR FOLLOW-UP EXAMINATION AFTER COMPLETED TREATMENT FOR CONDITIONS OTHER THAN MALIGNANT NEOPLASM: ICD-10-CM

## 2022-02-14 DIAGNOSIS — Z12.31 VISIT FOR SCREENING MAMMOGRAM: ICD-10-CM

## 2022-02-14 DIAGNOSIS — N83.201 CYST OF RIGHT OVARY: ICD-10-CM

## 2022-02-14 DIAGNOSIS — E89.0 POSTOPERATIVE HYPOTHYROIDISM: ICD-10-CM

## 2022-02-14 LAB — PTH-INTACT SERPL-MCNC: 19.3 PG/ML (ref 15–65)

## 2022-02-14 PROCEDURE — 93976 VASCULAR STUDY: CPT

## 2022-02-14 PROCEDURE — 77067 SCR MAMMO BI INCL CAD: CPT

## 2022-02-14 PROCEDURE — 77063 BREAST TOMOSYNTHESIS BI: CPT

## 2022-02-14 PROCEDURE — 36415 COLL VENOUS BLD VENIPUNCTURE: CPT

## 2022-02-14 PROCEDURE — 83970 ASSAY OF PARATHORMONE: CPT

## 2022-02-14 PROCEDURE — 76830 TRANSVAGINAL US NON-OB: CPT

## 2022-02-14 PROCEDURE — 76856 US EXAM PELVIC COMPLETE: CPT

## 2022-02-21 DIAGNOSIS — N83.201 CYSTS OF BOTH OVARIES: Primary | ICD-10-CM

## 2022-02-21 DIAGNOSIS — Z78.0 POSTMENOPAUSE: ICD-10-CM

## 2022-02-21 DIAGNOSIS — N83.202 CYSTS OF BOTH OVARIES: Primary | ICD-10-CM

## 2022-03-21 ENCOUNTER — PATIENT ROUNDING (BHMG ONLY) (OUTPATIENT)
Dept: OBSTETRICS AND GYNECOLOGY | Facility: CLINIC | Age: 72
End: 2022-03-21

## 2022-03-21 ENCOUNTER — TELEPHONE (OUTPATIENT)
Dept: FAMILY MEDICINE CLINIC | Facility: CLINIC | Age: 72
End: 2022-03-21

## 2022-03-21 ENCOUNTER — OFFICE VISIT (OUTPATIENT)
Dept: OBSTETRICS AND GYNECOLOGY | Facility: CLINIC | Age: 72
End: 2022-03-21

## 2022-03-21 VITALS
SYSTOLIC BLOOD PRESSURE: 128 MMHG | HEIGHT: 67 IN | WEIGHT: 208 LBS | DIASTOLIC BLOOD PRESSURE: 76 MMHG | BODY MASS INDEX: 32.65 KG/M2

## 2022-03-21 DIAGNOSIS — Z01.419 ROUTINE GYNECOLOGICAL EXAMINATION: ICD-10-CM

## 2022-03-21 DIAGNOSIS — N83.202 BILATERAL OVARIAN CYSTS: Primary | ICD-10-CM

## 2022-03-21 DIAGNOSIS — K57.30 DIVERTICULAR DISEASE OF COLON: ICD-10-CM

## 2022-03-21 DIAGNOSIS — N83.201 BILATERAL OVARIAN CYSTS: Primary | ICD-10-CM

## 2022-03-21 DIAGNOSIS — M19.90 ARTHRITIS: ICD-10-CM

## 2022-03-21 DIAGNOSIS — C73 PRIMARY MALIGNANT NEOPLASM OF THYROID GLAND: ICD-10-CM

## 2022-03-21 LAB
BILIRUB BLD-MCNC: NEGATIVE MG/DL
CLARITY, POC: CLEAR
COLOR UR: YELLOW
GLUCOSE UR STRIP-MCNC: NEGATIVE MG/DL
KETONES UR QL: NEGATIVE
LEUKOCYTE EST, POC: NEGATIVE
NITRITE UR-MCNC: NEGATIVE MG/ML
PH UR: 5 [PH] (ref 5–8)
PROT UR STRIP-MCNC: NEGATIVE MG/DL
RBC # UR STRIP: NEGATIVE /UL
SP GR UR: 1 (ref 1–1.03)
UROBILINOGEN UR QL: NORMAL

## 2022-03-21 PROCEDURE — 99204 OFFICE O/P NEW MOD 45 MIN: CPT | Performed by: OBSTETRICS & GYNECOLOGY

## 2022-03-21 NOTE — TELEPHONE ENCOUNTER
Caller: Stefani Watson    Relationship: Self    Best call back number: [phone]     Requested Prescriptions:       meloxicam (MOBIC) 15 MG tablet      calcitriol (ROCALTROL) 0.25 MCG capsule     Pharmacy where request should be sent:    [facility] #[id number] [facility] [address] [facility] AT [facility] - [phone] [place] [phone] FX  [phone]      Does the patient have less than a 3 day supply:  [] Yes  [x] No    Kamini Woods, RegSched Rep   03/21/22 10:48 EDT       PLEASE ADVISE.

## 2022-03-21 NOTE — PROGRESS NOTES
A MY-CHART MESSAGE HAS BEEN SENT TO THE PATIENT FOR PATIENT ROUNDING WITH [facility]

## 2022-03-21 NOTE — PROGRESS NOTES
"EVALUATION AND MANAGEMENT ENCOUNTER    Stefani Watson  Patient new to examiner? Yes  New problem to examiner? Yes  Patient referred? Yes    -----------------------------------------------------HISTORY---------------------------------------------------    Chief Complaint:   Chief Complaint   Patient presents with   • Consult     US from Dr Arias       HPI:  Stefani Watson is a 71 y.o.  with No LMP recorded. (Menstrual status: Chemotherapy/radiation). here for  Evaluation of incidental bilateral ovarian cysts found on CT scan.  Pt is from [place] and had this first diagnosed about a year ago.  Subsequent scans have not demonstrated any growth.  Pt has a new cyst.         History of Present Illness     Stefani Watson  reports that she has never smoked. She has never used smokeless tobacco..            ROS:  Review of Systems:    Patient reports that she is not currently experiencing any symptoms of urinary incontinence.      noTESTED FOR CHLAMYDIA?  -----------------------------------------------PHYSICAL EXAM----------------------------------------------    Vital Signs: /76   Ht 170.2 cm (67.01\")   Wt 94.3 kg (208 lb)   Breastfeeding No   BMI 32.57 kg/m²    Flowsheet Rows    Flowsheet Row First Filed Value   Admission Height 170.2 cm (67.01\") Documented at 2022 1427   Admission Weight 94.3 kg (208 lb) Documented at 2022 1427          Physical Exam  Vitals and nursing note reviewed. Exam conducted with a chaperone present.   Constitutional:       General: She is not in acute distress.     Appearance: She is well-developed. She is not diaphoretic.   HENT:      Head: Normocephalic and atraumatic.      Nose: Nose normal.   Eyes:      Extraocular Movements: Extraocular movements intact.   Cardiovascular:      Rate and Rhythm: Normal rate.   Pulmonary:      Effort: Pulmonary effort is normal.   Chest:   Breasts: Breasts are symmetrical.      Right: Normal. No mass, nipple discharge, skin change, " tenderness or axillary adenopathy.      Left: Normal. No mass, nipple discharge, skin change, tenderness or axillary adenopathy.       Abdominal:      General: There is no distension.      Palpations: Abdomen is soft. There is no mass.      Tenderness: There is no abdominal tenderness. There is no guarding.   Genitourinary:     Pubic Area: No rash.       Vagina: Normal. No vaginal discharge.      Cervix: Normal.      Uterus: Normal.       Adnexa: Right adnexa normal and left adnexa normal.   Musculoskeletal:         General: No tenderness or deformity. Normal range of motion.      Cervical back: Normal range of motion.   Lymphadenopathy:      Upper Body:      Right upper body: No axillary adenopathy.      Left upper body: No axillary adenopathy.   Skin:     General: Skin is warm and dry.      Coloration: Skin is not pale.      Findings: No erythema or rash.   Neurological:      Mental Status: She is alert and oriented to person, place, and time.   Psychiatric:         Behavior: Behavior normal.         Thought Content: Thought content normal.         Judgment: Judgment normal.         I saw the patient with a face mask, gloves and eye protection  The patient herself was masked.  Social distancing was observed as appropriate. All COVID precautions observed.     -----------------------------------------------MEDICAL DECISION MAKING-----------------------------        DATA Review & labs ordered:     1.   Lab Results (last 24 hours)     ** No results found for the last 24 hours. **        2.   Imaging Results (Last 24 Hours)     ** No results found for the last 24 hours. **        3.   ECG/EMG Results (most recent)     None              Diagnoses and all orders for this visit:    1. postmenopausal, asymptomatic Bilateral ovarian cysts (Primary)  -       -     CEA    2. Routine gynecological examination  -     POC Urinalysis Dipstick    3. Arthritis    4. Diverticular disease of colon    5. Primary malignant  neoplasm of thyroid gland (HCC)      U/s:  Bilateral ovarian cysts, <2.9cms, EL= 0.3cms      IMPRESSION/PROBLEM:      Asymptomatic postmenopausal bilateral ovarian cysts. +FHx ovarian cancer. (sister who was BRCA neg)    (Established problem/s? No, worsening? Yes)    (New Problem/s? Yes, additional workup planned? Yes)      PLAN:     1. Check tumor markers:   2. If neg: rec: lap BSO  3. If pos: gyn onc      Pt instructed to call for results of any testing done today and that failure to call if she has not heard from us could result in a bad outcome.  Pt verbalized her understanding.     RTO Return if symptoms worsen or fail to improve, for Recheck. .  Instructions and precautions given.     I spent 45+ cumulative minutes caring for Stefani on this date of service. This time includes time spent by me in the following activities: preparing for the visit, reviewing tests, obtaining and/or reviewing a separately obtained history, performing a medically appropriate examination and/or evaluation, counseling and educating the patient/family/caregiver, ordering medications, tests, or procedures, referring and communicating with other health care professionals, documenting information in the medical record, independently interpreting results and communicating that information with the patient/family/caregiver, care coordination and presence at bedside for u/s.        Rick Krishnan MD  19:38 EDT  03/27/22

## 2022-03-22 LAB
CANCER AG125 SERPL-ACNC: 17.9 U/ML (ref 0–38.1)
CEA SERPL-MCNC: 2.7 NG/ML (ref 0–4.7)

## 2022-03-24 RX ORDER — MELOXICAM 7.5 MG/1
7.5 TABLET ORAL DAILY PRN
Qty: 90 TABLET | Refills: 0 | Status: SHIPPED | OUTPATIENT
Start: 2022-03-24 | End: 2022-07-28 | Stop reason: SDUPTHER

## 2022-03-24 RX ORDER — CALCITRIOL 0.25 UG/1
0.25 CAPSULE, LIQUID FILLED ORAL DAILY
Qty: 90 CAPSULE | Refills: 1 | Status: SHIPPED | OUTPATIENT
Start: 2022-03-24

## 2022-04-20 ENCOUNTER — OFFICE VISIT (OUTPATIENT)
Dept: OBSTETRICS AND GYNECOLOGY | Facility: CLINIC | Age: 72
End: 2022-04-20

## 2022-04-20 VITALS
DIASTOLIC BLOOD PRESSURE: 88 MMHG | BODY MASS INDEX: 32.65 KG/M2 | HEIGHT: 67 IN | SYSTOLIC BLOOD PRESSURE: 140 MMHG | WEIGHT: 208 LBS

## 2022-04-20 DIAGNOSIS — Z01.411 ENCOUNTER FOR GYNECOLOGICAL EXAMINATION (GENERAL) (ROUTINE) WITH ABNORMAL FINDINGS: ICD-10-CM

## 2022-04-20 DIAGNOSIS — N83.202 BILATERAL OVARIAN CYSTS: Primary | ICD-10-CM

## 2022-04-20 DIAGNOSIS — N83.201 BILATERAL OVARIAN CYSTS: Primary | ICD-10-CM

## 2022-04-20 PROCEDURE — 99215 OFFICE O/P EST HI 40 MIN: CPT | Performed by: OBSTETRICS & GYNECOLOGY

## 2022-04-20 NOTE — PROGRESS NOTES
Subjective    Chief Complaint   Patient presents with   • Gynecologic Exam     New Pt consult about ovarian cyst       History of Present Illness    Stefani Watson is a 71 y.o. female who presents for consult concerning bilateral ovarian cyst.  Patient [place] she had a right ovarian cyst.  An ultrasound performed then showed a simple 3.5 cm right ovarian cyst.  She had a follow-up ultrasound at another office in 2022 which showed a 3 mm stripe and a simple 2.2 cm left ovarian cyst and simple 2.8 cm right ovarian cyst.  Her sister did have ovarian cancer but was BRCA negative.  Patient had a recent Ca1 25 normal at 17.  Patient having absolutely no pelvic pain or vaginal bleeding.  She is here for a second opinion concerning her ovarian cyst.    Obstetric History:  OB History        1    Para   1    Term   1       0    AB   0    Living   1       SAB   0    IAB        Ectopic        Molar        Multiple        Live Births                   Menstrual History:     No LMP recorded. (Menstrual status: Chemotherapy/radiation).       Past Medical History:   Diagnosis Date   • HL (hearing loss)    • Hyperlipidemia    • Hypothyroidism     Thyroid removed   • Thyroid cancer (HCC)      Family History   Problem Relation Age of Onset   • Heart disease Mother    • Cancer Sister    • Ovarian cancer Sister    • Cancer Maternal Grandmother    • Breast cancer Maternal Grandmother        The following portions of the patient's history were reviewed and updated as appropriate: allergies, current medications, past family history, past medical history, past social history, past surgical history and problem list.    Review of Systems   Constitutional: Negative.  Negative for fever and unexpected weight change.   HENT: Negative.    Respiratory: Negative for shortness of breath and wheezing.    Cardiovascular: Negative for chest pain, palpitations and leg swelling.  "  Gastrointestinal: Negative for abdominal pain, anal bleeding and blood in stool.   Genitourinary: Negative for dysuria, pelvic pain, urgency, vaginal bleeding, vaginal discharge and vaginal pain.   Skin: Negative.    Neurological: Negative.    Hematological: Negative.  Negative for adenopathy.   Psychiatric/Behavioral: Negative.  Negative for dysphoric mood. The patient is not nervous/anxious.             Objective   Physical Exam  Exam conducted with a chaperone present.   Constitutional:       Appearance: Normal appearance.   HENT:      Head: Normocephalic and atraumatic.   Pulmonary:      Effort: Pulmonary effort is normal.   Abdominal:      General: Abdomen is flat. There is no distension.      Palpations: Abdomen is soft. There is no mass.      Tenderness: There is no abdominal tenderness. There is no guarding.   Genitourinary:     General: Normal vulva.      Labia:         Right: No lesion.         Left: No lesion.       Urethra: No prolapse.      Vagina: Normal.      Cervix: Normal.      Uterus: Normal.       Adnexa: Right adnexa normal and left adnexa normal.      Rectum: Normal.   Skin:     General: Skin is warm and dry.   Neurological:      Mental Status: She is alert and oriented to person, place, and time.   Psychiatric:         Mood and Affect: Mood normal.         Behavior: Behavior normal.         Thought Content: Thought content normal.         /88   Ht 170.2 cm (67.01\")   Wt 94.3 kg (208 lb)   BMI 32.57 kg/m²     Assessment/Plan   Diagnoses and all orders for this visit:    1. Bilateral ovarian cysts (Primary)  -     IGP,rfx Aptima HPV All Pth  -     US Non-ob Transvaginal; Future    2. Encounter for gynecological examination (general) (routine) with abnormal findings   -     IGP,rfx Aptima HPV All Pth        Impression and plan.  40-minute visit today of which 35 minutes was reviewing her old records with her, reviewing her history, and discussing her work-up to date of her ovarian cyst. "  We discussed that her right ovarian cyst over the previous year if anything has decreased from 3.5 cm to 2.8 cm.  Her left ovarian cyst was small at 2.2 cm per an ultrasound last month.  Both simple cysts.  We did discuss that although her sister is BRCA negative, that absolutely does not mean that patient could not develop ovarian cancer.  The only way to be 100% certain about her ovaries concerning cancer is to have surgery and have them removed.  That was suggested by Dr. Krishnan and absolutely would put this issue to rest.  Nevertheless patient does not want to have surgery unless absolutely necessary so we did discuss a 3-month follow-up ultrasound to see if there is any change.  I also stated that I could send her to Dr. Mayo the GYN oncologist for a specialist opinion in this case and she has declined it at this time.  She is very comfortable and rechecking an ultrasound 3 months from her previous one done in March to see if there is any change.  I have reiterated that even with a normal Ca1 25, without surgery we will never know 100% concerning malignancy, but nevertheless her ultrasounds to date have not shown anything suspicious.  The patient will return in June with another ultrasound and if there is any change we will discuss at that time possible GYN oncology consultation.  Patient is very happy with this plan.

## 2022-04-26 LAB
CONV .: NORMAL
CYTOLOGIST CVX/VAG CYTO: NORMAL
CYTOLOGY CVX/VAG DOC CYTO: NORMAL
CYTOLOGY CVX/VAG DOC THIN PREP: NORMAL
DX ICD CODE: NORMAL
HIV 1 & 2 AB SER-IMP: NORMAL
OTHER STN SPEC: NORMAL
STAT OF ADQ CVX/VAG CYTO-IMP: NORMAL

## 2022-06-02 NOTE — TELEPHONE ENCOUNTER
Rx Refill Note  Requested Prescriptions     Pending Prescriptions Disp Refills   • citalopram (CeleXA) 20 MG tablet [Pharmacy Med Name: CITALOPRAM 20MG TABLETS] 90 tablet 1     Sig: TAKE 1 TABLET BY MOUTH DAILY      Last office visit with prescribing clinician: 1/28/2022      Next office visit with prescribing clinician: 7/28/2022            Maryan Aguirre LPN  06/02/22, 13:50 EDT

## 2022-06-03 RX ORDER — CITALOPRAM 20 MG/1
20 TABLET ORAL DAILY
Qty: 90 TABLET | Refills: 1 | Status: SHIPPED | OUTPATIENT
Start: 2022-06-03 | End: 2022-11-28

## 2022-06-07 NOTE — TELEPHONE ENCOUNTER
Rx Refill Note  Requested Prescriptions     Pending Prescriptions Disp Refills   • levothyroxine (SYNTHROID, LEVOTHROID) 125 MCG tablet [Pharmacy Med Name: LEVOTHYROXINE 0.125MG (125MCG) TAB] 90 tablet 1     Sig: TAKE 1 TABLET BY MOUTH EVERY MORNING      Last office visit with prescribing clinician: 1/28/2022      Next office visit with prescribing clinician: 7/28/2022            Maryan Aguirre LPN  06/07/22, 16:35 EDT

## 2022-06-08 RX ORDER — LEVOTHYROXINE SODIUM 0.12 MG/1
125 TABLET ORAL
Qty: 90 TABLET | Refills: 1 | Status: SHIPPED | OUTPATIENT
Start: 2022-06-08 | End: 2022-12-12

## 2022-06-29 ENCOUNTER — OFFICE VISIT (OUTPATIENT)
Dept: OBSTETRICS AND GYNECOLOGY | Facility: CLINIC | Age: 72
End: 2022-06-29

## 2022-06-29 VITALS
HEIGHT: 67 IN | SYSTOLIC BLOOD PRESSURE: 124 MMHG | WEIGHT: 208 LBS | DIASTOLIC BLOOD PRESSURE: 78 MMHG | BODY MASS INDEX: 32.65 KG/M2

## 2022-06-29 DIAGNOSIS — Z80.41 FAMILY HISTORY OF OVARIAN CANCER: ICD-10-CM

## 2022-06-29 DIAGNOSIS — N83.201 BILATERAL OVARIAN CYSTS: Primary | ICD-10-CM

## 2022-06-29 DIAGNOSIS — N83.202 BILATERAL OVARIAN CYSTS: Primary | ICD-10-CM

## 2022-06-29 PROCEDURE — 99213 OFFICE O/P EST LOW 20 MIN: CPT | Performed by: OBSTETRICS & GYNECOLOGY

## 2022-06-29 NOTE — PROGRESS NOTES
"Subjective    Chief Complaint   Patient presents with   • Follow-up     Discuss u/s      History of Present Illness    Stefani Watson is a 71 y.o. female who presents for follow-up of bilateral ovarian cysts.  Patient had a CT scan performed over a year ago with a right ovarian cyst.  Follow-up ultrasound showed a simple 2.2 cm left ovarian cyst and 2.8 cm right ovarian cyst.  Patient do not want surgery so we did a  which was normal and elected for repeat ultrasound 3 months later.  Ultrasound today shows a same 2.2 cm simple left ovarian cyst but the right ovarian cyst is now 3.2 cm in size and complex with a questionable hydrosalpinx beside it.  Patient came in today to discuss ultrasound results.  Patient's sister did have ovarian cancer but was BRCA negative.    Obstetric History:  OB History        1    Para   1    Term   1       0    AB   0    Living   1       SAB   0    IAB        Ectopic        Molar        Multiple        Live Births                   Menstrual History:     No LMP recorded. (Menstrual status: Chemotherapy/radiation).       Past Medical History:   Diagnosis Date   • HL (hearing loss)    • Hyperlipidemia    • Hypothyroidism     Thyroid removed   • Thyroid cancer (HCC)      Family History   Problem Relation Age of Onset   • Heart disease Mother    • Cancer Sister    • Ovarian cancer Sister    • Cancer Maternal Grandmother    • Breast cancer Maternal Grandmother        The following portions of the patient's history were reviewed and updated as appropriate: allergies, current medications, past family history, past medical history, past surgical history and problem list.    Review of Systems  Negative for pelvic pain etc.       Objective   Physical Exam  No exam done today.  /78   Ht 170.2 cm (67.01\")   Wt 94.3 kg (208 lb)   BMI 32.57 kg/m²     Assessment & Plan   Diagnoses and all orders for this visit:    1. Bilateral ovarian cysts (Primary)  -     Ambulatory " Referral to Gynecologic Oncology    2. Family history of ovarian cancer        Impression and plan.  Lengthy discussion with patient about the slight change of her right ovarian cyst although essentially the sizes of her cysts have not changed in over a year.  I discussed that the recommendation was still for removal as there is no other way to prove malignancy is not an issue.  Patient definitely does not want surgery though.  I offered her GYN oncology consultation to further discuss the risks of following the cysts with ultrasound or possible CT/MRI and she readily opted for at.  I explained to her that if GYN oncology felt strongly the ovaries should be removed and patient agreed, then it would probably be best for them to perform it in case malignancy was found and patient understood this.                no

## 2022-07-18 ENCOUNTER — OFFICE VISIT (OUTPATIENT)
Dept: GYNECOLOGIC ONCOLOGY | Facility: CLINIC | Age: 72
End: 2022-07-18

## 2022-07-18 ENCOUNTER — PATIENT ROUNDING (BHMG ONLY) (OUTPATIENT)
Dept: GYNECOLOGIC ONCOLOGY | Facility: CLINIC | Age: 72
End: 2022-07-18

## 2022-07-18 VITALS
HEART RATE: 69 BPM | BODY MASS INDEX: 32.83 KG/M2 | OXYGEN SATURATION: 98 % | HEIGHT: 67 IN | RESPIRATION RATE: 20 BRPM | WEIGHT: 209.2 LBS | SYSTOLIC BLOOD PRESSURE: 146 MMHG | DIASTOLIC BLOOD PRESSURE: 92 MMHG

## 2022-07-18 DIAGNOSIS — N83.201 BILATERAL OVARIAN CYSTS: Primary | ICD-10-CM

## 2022-07-18 DIAGNOSIS — N83.202 BILATERAL OVARIAN CYSTS: Primary | ICD-10-CM

## 2022-07-18 PROCEDURE — 99203 OFFICE O/P NEW LOW 30 MIN: CPT | Performed by: OBSTETRICS & GYNECOLOGY

## 2022-07-18 NOTE — PROGRESS NOTES
A My-Chart message has been sent to the patient for PATIENT ROUNDING with [facility]

## 2022-07-28 ENCOUNTER — OFFICE VISIT (OUTPATIENT)
Dept: FAMILY MEDICINE CLINIC | Facility: CLINIC | Age: 72
End: 2022-07-28

## 2022-07-28 VITALS
OXYGEN SATURATION: 98 % | BODY MASS INDEX: 32.85 KG/M2 | SYSTOLIC BLOOD PRESSURE: 130 MMHG | WEIGHT: 209.3 LBS | DIASTOLIC BLOOD PRESSURE: 80 MMHG | TEMPERATURE: 97.8 F | HEIGHT: 67 IN | RESPIRATION RATE: 19 BRPM | HEART RATE: 95 BPM

## 2022-07-28 DIAGNOSIS — Z00.00 MEDICARE ANNUAL WELLNESS VISIT, SUBSEQUENT: Primary | ICD-10-CM

## 2022-07-28 DIAGNOSIS — Z78.0 POSTMENOPAUSAL: ICD-10-CM

## 2022-07-28 PROCEDURE — 1160F RVW MEDS BY RX/DR IN RCRD: CPT | Performed by: FAMILY MEDICINE

## 2022-07-28 PROCEDURE — G0439 PPPS, SUBSEQ VISIT: HCPCS | Performed by: FAMILY MEDICINE

## 2022-07-28 PROCEDURE — 1170F FXNL STATUS ASSESSED: CPT | Performed by: FAMILY MEDICINE

## 2022-07-28 RX ORDER — MELOXICAM 7.5 MG/1
7.5 TABLET ORAL DAILY PRN
Qty: 90 TABLET | Refills: 0 | Status: SHIPPED | OUTPATIENT
Start: 2022-07-28 | End: 2023-02-11 | Stop reason: HOSPADM

## 2022-08-05 ENCOUNTER — OFFICE VISIT (OUTPATIENT)
Dept: GYNECOLOGIC ONCOLOGY | Facility: CLINIC | Age: 72
End: 2022-08-05

## 2022-08-05 DIAGNOSIS — N83.202 BILATERAL OVARIAN CYSTS: Primary | ICD-10-CM

## 2022-08-05 DIAGNOSIS — D39.9 NEOPLASM OF UNCERTAIN BEHAVIOR OF FEMALE GENITAL ORGAN: ICD-10-CM

## 2022-08-05 DIAGNOSIS — N83.201 BILATERAL OVARIAN CYSTS: Primary | ICD-10-CM

## 2022-08-05 PROCEDURE — 99442 PR PHYS/QHP TELEPHONE EVALUATION 11-20 MIN: CPT | Performed by: OBSTETRICS & GYNECOLOGY

## 2022-08-05 NOTE — PROGRESS NOTES
*TELEHEALTH VISIT *     Patient consented to telephone visit for medical care today.   Total time spent reviewing images, labs, discussion and plan was 15 minutes.       Hazel Drummond D.O  2022          Age: 71 y.o.  Sex: female  :  1950  MRN: [id number]     REFERRING PHYSICIAN: No ref. provider found  DATE OF VISIT: 2022      Stefani Watson presents for televisit to review thoughts about hysterectomy for indication of bilateral ovarian cysts with woman who has family hx of breast and ovarian cancer.  She had a sister pass from ovarian cancer (BRCA negative) and also had a grandmother with breast cancer. She is UTD with mammo. Due for CSP.  Denies pain, bloating, early satiety ETC.  States she has had several other health issues creep up including abscess tooth and need for CSP.       Oncology/Hematology History Overview Note   Stefani Waston is a 71 y.o. female referred by Dr. Akhil Zuniga ([facility]) for bilateral ovarian cysts.     • 3/21/22: TVUS - Ut av, EL = 0.3cm, small michael ov cysts, no free fluid.  • 22: TVUS - 2 mm endometrial stripe.  Same 2.2 cm simple left ovarian cyst.  Compared to previous ultrasounds right cyst still only around 3 cm in size but now appears complex with questionable hydrosalpinx adjacent to it.        22: Follow Up         Lab Results   Component Value Date     17.9 2022             Past Medical History:  Past Medical History:   Diagnosis Date   • HL (hearing loss)    • Hyperlipidemia    • Hypothyroidism     Thyroid removed   • Thyroid cancer (HCC)        Past Surgical History:  Past Surgical History:   Procedure Laterality Date   • CATARACT EXTRACTION, BILATERAL     • EYE SURGERY      Cataracts   • LAPAROSCOPIC TUBAL LIGATION     • ROTATOR CUFF REPAIR Left    • STAPEDECTOMY Right    • THYROIDECTOMY     • TUBAL ABDOMINAL LIGATION          MEDICATIONS:    Current Outpatient Medications:   •  calcitriol (ROCALTROL) 0.25 MCG capsule, Take 1 capsule by  mouth Daily., Disp: 90 capsule, Rfl: 1  •  citalopram (CeleXA) 20 MG tablet, TAKE 1 TABLET BY MOUTH DAILY, Disp: 90 tablet, Rfl: 1  •  coenzyme Q10 100 MG capsule, Take 100 mg by mouth Daily., Disp: , Rfl:   •  latanoprost (XALATAN) 0.005 % ophthalmic solution, USE 1 DROP IN BOTH EYES AT BEDTIME, Disp: , Rfl:   •  levothyroxine (SYNTHROID, LEVOTHROID) 125 MCG tablet, TAKE 1 TABLET BY MOUTH EVERY MORNING, Disp: 90 tablet, Rfl: 1  •  MAGNESIUM OXIDE PO, Take 64 mg by mouth Daily. Takes 4 tablets daily, Disp: , Rfl:   •  meloxicam (Mobic) 7.5 MG tablet, Take 1 tablet by mouth Daily As Needed (joint pain)., Disp: 90 tablet, Rfl: 0  •  pravastatin (PRAVACHOL) 40 MG tablet, Take 1 tablet by mouth Daily., Disp: 90 tablet, Rfl: 3    ALLERGIES:  Allergies   Allergen Reactions   • Blue Dyes (Parenteral) Rash   • Simvastatin Myalgia         ROS:  CONSTITUTIONAL:  Denies fever or chills.   NEUROLOGIC:  Denies headache, focal weakness or sensory changes.   EYES:  Denies change in visual acuity.  HEENT:  Denies nasal congestion or sore throat.   RESPIRATORY:  Denies cough or shortness of breath.   CARDIOVASCULAR:  Denies chest pain or edema.   GI:  Denies abdominal pain, nausea, vomiting, bloody stools or diarrhea.   :  Denies dysuria, leaking or incontinence.  MUSCULOSKELETAL:  Denies back pain or joint pain.   INTEGUMENT:  Denies rash.   ENDOCRINE:  Denies polyuria or polydipsia.   LYMPHATIC:  Denies swollen glands or lymphedema.   PSYCHIATRIC:  Denies depression or anxiety.      PHYSICAL EXAM:  There were no vitals filed for this visit.    There is no height or weight on file to calculate BMI.    Current Status 7/18/2022   ECOG score 0     PHQ-9 Total Score:           GEN: alert, normal affect, in no acute distress  CARDIO: regular rate and rhythm  PULM: Lungs CTA bilaterally, no RRW   ABD: Soft, nontender, nondistended  GYN: defer today  EXT: No petechiae, bruising, rash, candida. No CCE.         Result Review :  The  pertinent labs, images, and/or pathology as noted in the oncology history were reviewed independently and discussed with the patient.   Hazel Drummond,    07/18/2022      [facility]:   WBC   Date Value Ref Range Status   07/25/2022 7.0 3.4 - 10.8 x10E3/uL Final     RBC   Date Value Ref Range Status   07/25/2022 4.68 3.77 - 5.28 x10E6/uL Final     Hemoglobin   Date Value Ref Range Status   07/25/2022 12.9 11.1 - 15.9 g/dL Final     Hematocrit   Date Value Ref Range Status   07/25/2022 39.6 34.0 - 46.6 % Final     Platelets   Date Value Ref Range Status   07/25/2022 340 150 - 450 x10E3/uL Final        Date Value Ref Range Status   03/21/2022 17.9 0.0 - 38.1 U/mL Final     Comment:     [employer] Electrochemiluminescence Immunoassay (ECLIA)  Values obtained with different assay methods or kits cannot be  used interchangeably.  Results cannot be interpreted as absolute  evidence of the presence or absence of malignant disease.         [facility]:  No radiology results for the last 90 days.      ASSESSMENT :  • Bilateral ovarian cysts- Left 2.2cm, Right 3cm. Normal   • Family hx breast and ovarian cancer - sister negative BRCA   • Tooth infection - on amoxicillin,       PLAN :  Today we discussed options for management, I discussed that I am not overtly concerned for malignancy but cannot determine this with 100% certainty unless it is evaluated by a pathologist. She would like to take care of several other health issues first, (tooth pulled and CSP)     Offered to manage with observation with TVUS and  - pt is amenable to this approach.     Order placed for this and in person visit in 11/2022.            Hazel Drummond D.O.  8/5/2022    Gynecologic Oncology   [address] [place] [address] [address] [place] [address]  [phone] office

## 2022-08-17 ENCOUNTER — OFFICE VISIT (OUTPATIENT)
Dept: FAMILY MEDICINE CLINIC | Facility: CLINIC | Age: 72
End: 2022-08-17

## 2022-08-17 VITALS
TEMPERATURE: 96.5 F | DIASTOLIC BLOOD PRESSURE: 87 MMHG | WEIGHT: 210 LBS | BODY MASS INDEX: 32.96 KG/M2 | HEART RATE: 70 BPM | OXYGEN SATURATION: 98 % | SYSTOLIC BLOOD PRESSURE: 145 MMHG | HEIGHT: 67 IN

## 2022-08-17 DIAGNOSIS — L50.9 HIVES: Primary | ICD-10-CM

## 2022-08-17 PROCEDURE — 99214 OFFICE O/P EST MOD 30 MIN: CPT | Performed by: NURSE PRACTITIONER

## 2022-08-17 RX ORDER — METHYLPREDNISOLONE 4 MG/1
TABLET ORAL
Qty: 21 TABLET | Refills: 0 | Status: SHIPPED | OUTPATIENT
Start: 2022-08-17 | End: 2022-11-07

## 2022-08-17 NOTE — PROGRESS NOTES
"Chief Complaint  Rash (C/o rash on arms x 2-3 days that is reoccuring)    Subjective        Stefani Watson presents to [facility]  Hives and itchy rash  Started 4- 5 days ago,  No fever no chest pain no facial swelling no angioedema  No significant dyspnea  Allegra OTC helps.  New bath wash body wash a couple weeks ago only new agent, but had no problems nothing else new  Hives years ago likely related to some blue dye with thyroid medicine this has been changed    No previous difficulties with penicillin          Rash        Objective   Vital Signs:  /87   Pulse 70   Temp 96.5 °F (35.8 °C)   Ht 170 cm (66.93\")   Wt 95.3 kg (210 lb)   SpO2 98%   BMI 32.96 kg/m²   Estimated body mass index is 32.96 kg/m² as calculated from the following:    Height as of this encounter: 170 cm (66.93\").    Weight as of this encounter: 95.3 kg (210 lb).          Physical Exam  Vitals reviewed.   Constitutional:       General: She is not in acute distress.     Appearance: Normal appearance. She is well-developed. She is not ill-appearing, toxic-appearing or diaphoretic.      Comments: Very pleasant appears well   HENT:      Head: Normocephalic.      Comments: Externally ENT normal no facial swelling speech clear no stridor     Nose: Nose normal.   Eyes:      General: No scleral icterus.     Conjunctiva/sclera: Conjunctivae normal.      Pupils: Pupils are equal, round, and reactive to light.   Neck:      Thyroid: No thyromegaly.      Vascular: No JVD.   Cardiovascular:      Rate and Rhythm: Normal rate and regular rhythm.      Heart sounds: Normal heart sounds. No murmur heard.    No friction rub. No gallop.   Pulmonary:      Effort: Pulmonary effort is normal. No respiratory distress.      Breath sounds: Normal breath sounds. No stridor. No wheezing or rales.   Abdominal:      General: Bowel sounds are normal. There is no distension.      Palpations: Abdomen is soft.      Tenderness: There is no " abdominal tenderness.      Comments: No hepatosplenomegaly, no ascites,   Musculoskeletal:         General: No tenderness.      Cervical back: Neck supple.   Lymphadenopathy:      Cervical: No cervical adenopathy.   Skin:     General: Skin is warm and dry.      Findings: No erythema or rash.      Comments: Blotchy reddish warm rash upper chest  Positive blanching  No cellulitis no vesicles or pustules or other worrisome lesions  No angioedema   Neurological:      Mental Status: She is alert and oriented to person, place, and time.      Deep Tendon Reflexes: Reflexes are normal and symmetric.   Psychiatric:         Behavior: Behavior normal.         Thought Content: Thought content normal.         Judgment: Judgment normal.        Result Review :                Assessment and Plan   There are no diagnoses linked to this encounter.         Follow Up   No follow-ups on file.  Patient was given instructions and counseling regarding her condition or for health maintenance advice. Please see specific information pulled into the AVS if appropriate.

## 2022-08-17 NOTE — PATIENT INSTRUCTIONS
Discharge instructions    Caution with Amoxil  If your dentist needs to give you antibiotic  Would avoid marks over the next couple weeks use alternative such as clindamycin etc.?    You may be allergic to Amoxil, you can discuss this with Dr. Arias  And before prescribing penicillin in the future have this conversation about a possible allergy?      Zyrtec 10 mg generic daily for the next couple weeks  Pepcid 40 mg twice daily for 2 weeks then DC  Benadryl 25 mg up to 50 mg 4 times a day as needed but caution sedation and use lowest effective dose    Medrol Dosepak low-dose steroid pack for allergy  Recheck blood pressure  Make sure your blood pressure is doing well tomorrow keep your appointment with your dentist    Send me a message in 1 week and let me know how your hives are doing  Reevaluate your mouthwash it could be related to the mouthwash hold for now    Increased chest pain shortness of breath facial swelling weakness severe rash and fever emergency room worsening.      Before ever taking Amoxil or penicillin always discussed with your provider extreme caution should be taken with Benadryl on hand if taking this in the future  If hives after taking Amoxil on a second occurrence  This would be a legitimate allergy and you should avoid

## 2022-09-10 DIAGNOSIS — Z86.010 PERSONAL HISTORY OF COLONIC POLYPS: ICD-10-CM

## 2022-09-10 DIAGNOSIS — Z12.11 SCREENING FOR COLON CANCER: Primary | ICD-10-CM

## 2022-09-15 ENCOUNTER — APPOINTMENT (OUTPATIENT)
Dept: LAB | Facility: HOSPITAL | Age: 72
End: 2022-09-15

## 2022-09-15 ENCOUNTER — CLINICAL SUPPORT (OUTPATIENT)
Dept: GENETICS | Facility: HOSPITAL | Age: 72
End: 2022-09-15

## 2022-09-15 DIAGNOSIS — Z13.79 GENETIC TESTING: Primary | ICD-10-CM

## 2022-09-15 DIAGNOSIS — C73 PRIMARY MALIGNANT NEOPLASM OF THYROID GLAND: ICD-10-CM

## 2022-09-15 DIAGNOSIS — Z80.41 FAMILY HISTORY OF OVARIAN CANCER: ICD-10-CM

## 2022-09-15 DIAGNOSIS — Z80.3 FAMILY HISTORY OF BREAST CANCER: ICD-10-CM

## 2022-09-15 PROCEDURE — 96040: CPT | Performed by: GENETIC COUNSELOR, MS

## 2022-09-15 NOTE — PROGRESS NOTES
Stefani Watson, a 72-year-old female, was referred for genetic counseling due to a family history of breast and ovarian cancer. Ms. Watson has a history of thyroid cancer at 51.  She had surgery to remover her thyroid and radioactive iodine treatments. She was 11 years old at menarche and had her first child at age 18. She went through menopause at 51 and retains her uterus and ovaries. She is currently having issues with bilateral ovarian cysts and considering a hysterectomy. Ms. Watson’s most recent mammogram was in February 2022 and was normal. She had a colonoscopy 6 years ago and reports a history of less than 10 polyps. She was interested in discussing her risk for a hereditary cancer syndrome.   Ms. Watson decided to pursue comprehensive genetic testing to evaluate her risk of cancer, therefore the CancerNext Expanded Panel was ordered through [facility] which analyzes 77 genes associated with an increased cancer risk. Results are expected in 2-3 weeks.      PERTINENT FAMILY HISTORY: (See attached pedigree)   Sister:   Ovarian cancer, 55  Mat Grandmother: Breast cancer  Mat Cousin 1:  Breast cancer, >50  Mat Cousin 2:  Cancer, unknown type  Pat Cousin x2:  Prostate cancer    We do not have medical records regarding any of these diagnoses.       RISK ASSESSMENT:  Ms. Watson’s family history of breast cancer raises the question of a hereditary cancer syndrome.  NCCN guidelines for genetic testing for BRCA1/2 states that individuals with a close relative with ovarian cancer at any age may consider genetic testing. With Ms. Watson’s sister’s diagnosis of ovarian cancer, she would clearly meet this criteria. This risk assessment is based on the family history information provided at the time of the appointment.  The assessment could change in the future should new information be obtained.    GENETIC COUNSELING (30 minutes):  We reviewed the family history information in detail. Cases of cancer follow three  general patterns: sporadic, familial, and hereditary.  While most cancer is sporadic, some cases appear to occur in family clusters.  These cases are said to be familial and account for 10-20% of cancer cases.  Familial cases may be due to a combination of shared genes and environmental factors among family members.  In even fewer cases, the risk for cancer is inherited, and the genes responsible for the increased cancer risk are known.       Family histories typical of hereditary cancer syndromes usually include multiple first- and second-degree relatives diagnosed with cancer types that define a syndrome.  These cases tend to be diagnosed at younger-than-expected ages and can be bilateral or multifocal.  The cancer in these families follows an autosomal dominant inheritance pattern, which indicates the likely presence of a mutation in a cancer susceptibility gene.  Children and siblings of an individual believed to carry this mutation have a 50% chance of inheriting that mutation, thereby inheriting the increased risk to develop cancer.  These mutations can be passed down from the maternal or the paternal lineage.     Due to Ms. Watson’s family history of breast cancer, we discussed hereditary breast cancer. Hereditary breast cancer accounts for 5-10% of all cases of breast cancer.  A significant proportion (50%) of hereditary breast cancer can be attributed to mutations in the BRCA1 and BRCA2 genes.  Mutations in these genes confer an increased risk for breast cancer, ovarian cancer, male breast cancer, prostate cancer, and pancreatic cancer.  Women with a BRCA1 or BRCA2 mutation have up to an 87% lifetime risk of breast cancer and up to a 44% risk of ovarian cancer.  There are other clinically significant breast cancer related genes in addition to BRCA1/2, including PALB2, MEMO, and CHEK2.     There are other hereditary cancer syndromes as well. Based on Ms. Watson’s family history and her desire to get more  information regarding her personal risks, testing was pursued through a multigene panel evaluating several other genes known to increase the risk for cancer.     GENETIC TESTING:  The risks, benefits, and limitations of genetic testing and implications for clinical management following testing were reviewed.  DNA test results can influence decisions regarding screening and prevention.  Genetic testing can have significant psychological implications for both individuals and families. Also discussed was the possibility of employment and insurance discrimination based on genetic test results and the laws in place to prevent this, as well as the limitations of these laws.       We discussed panel testing, which would involve testing 77 genes associated with increased cancer risk. The implications of a positive or negative test result were discussed.  We also discussed the importance of testing an affected relative and how a negative result for Ms. Watson wouldn’t necessarily mean the cancers presenting in her family weren’t due to a genetic mutation that Ms. Watson did not inherit.  In general, a negative genetic test result is most informative if a mutation has first been established in an affected member of the family.  In cases where an affected individual is not available or interested in testing, it is appropriate to offer testing to an unaffected individual. We discussed the possibility that, in some cases, genetic test results may be ambiguous due to the identification of a genetic variant. These variants may or may not be associated with an increased cancer risk. With multigene panel testing, it is not uncommon for a variant of uncertain significance (VUS) to be identified.  If a VUS is identified, testing family members is typically not recommended and screening recommendations are made based on the family history.  The laboratories that perform genetic testing work to reclassify the VUS and send out an amended  report if and when a VUS is reclassified.  The majority of variant findings are ultimately reclassified to a negative result. Given Ms. Watson’s family history, a negative test result does not eliminate all cancer risk, although the risk would not be as high as it would with positive genetic testing.     PLAN: Genetic testing was ordered via the CancerNext Expanded Panel through [facility]. Results are expected in 2-3 weeks and will be called out to Ms. Watson. If she has any questions in the meantime, she is welcome to call me at [phone].      Helene Melo MS, [facility], [facility]  Licensed Certified Genetic Counselor

## 2022-09-28 ENCOUNTER — DOCUMENTATION (OUTPATIENT)
Dept: GENETICS | Facility: HOSPITAL | Age: 72
End: 2022-09-28

## 2022-09-28 NOTE — PROGRESS NOTES
Stefani Watson, a 72-year-old female, was referred for genetic counseling due to a family history of breast and ovarian cancer. Ms. Watson has a history of thyroid cancer at 51.  She had surgery to remover her thyroid and radioactive iodine treatments. She was 11 years old at menarche and had her first child at age 18. She went through menopause at 51 and retains her uterus and ovaries. She is currently having issues with bilateral ovarian cysts and considering a hysterectomy. Ms. Watson’s most recent mammogram was in February 2022 and was normal. She had a colonoscopy 6 years ago and reports a history of less than 10 polyps. She was interested in discussing her risk for a hereditary cancer syndrome.   Ms. Watson decided to pursue comprehensive genetic testing to evaluate her risk of cancer, therefore the CancerNext Expanded Panel was ordered through [employer] which analyzes 77 genes associated with an increased cancer risk. 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 These results were discussed with Ms. Watson by telephone on 9/27/22.     PERTINENT FAMILY HISTORY: (See attached pedigree)   Sister:   Ovarian cancer, 55  Mat Grandmother: Breast cancer  Mat Cousin 1:  Breast cancer, >50  Mat Cousin 2:  Cancer, unknown type  Pat Cousin x2:  Prostate cancer    We do not have medical records regarding any of these diagnoses.       RISK ASSESSMENT:  Ms. Watson’s family history of breast cancer raises the question of a hereditary cancer syndrome.  NCCN guidelines for genetic testing for BRCA1/2 states that individuals with a close relative with ovarian cancer at any age may consider genetic testing. With Ms. Watson’s sister’s diagnosis of ovarian cancer, she would clearly meet this criteria. This risk assessment is based on the family history information provided at the time of the appointment.  The assessment  could change in the future should new information be obtained.    GENETIC COUNSELING (30 minutes):  We reviewed the family history information in detail. Cases of cancer follow three general patterns: sporadic, familial, and hereditary.  While most cancer is sporadic, some cases appear to occur in family clusters.  These cases are said to be familial and account for 10-20% of cancer cases.  Familial cases may be due to a combination of shared genes and environmental factors among family members.  In even fewer cases, the risk for cancer is inherited, and the genes responsible for the increased cancer risk are known.       Family histories typical of hereditary cancer syndromes usually include multiple first- and second-degree relatives diagnosed with cancer types that define a syndrome.  These cases tend to be diagnosed at younger-than-expected ages and can be bilateral or multifocal.  The cancer in these families follows an autosomal dominant inheritance pattern, which indicates the likely presence of a mutation in a cancer susceptibility gene.  Children and siblings of an individual believed to carry this mutation have a 50% chance of inheriting that mutation, thereby inheriting the increased risk to develop cancer.  These mutations can be passed down from the maternal or the paternal lineage.     Due to Ms. Watson’s family history of breast cancer, we discussed hereditary breast cancer. Hereditary breast cancer accounts for 5-10% of all cases of breast cancer.  A significant proportion (50%) of hereditary breast cancer can be attributed to mutations in the BRCA1 and BRCA2 genes.  Mutations in these genes confer an increased risk for breast cancer, ovarian cancer, male breast cancer, prostate cancer, and pancreatic cancer.  Women with a BRCA1 or BRCA2 mutation have up to an 87% lifetime risk of breast cancer and up to a 44% risk of ovarian cancer.  There are other clinically significant breast cancer related genes  in addition to BRCA1/2, including PALB2, MEMO, and CHEK2.     There are other hereditary cancer syndromes as well. Based on Ms. Watson’s family history and her desire to get more information regarding her personal risks, testing was pursued through a multigene panel evaluating several other genes known to increase the risk for cancer.     GENETIC TESTING:  The risks, benefits, and limitations of genetic testing and implications for clinical management following testing were reviewed.  DNA test results can influence decisions regarding screening and prevention.  Genetic testing can have significant psychological implications for both individuals and families. Also discussed was the possibility of employment and insurance discrimination based on genetic test results and the laws in place to prevent this, as well as the limitations of these laws.       We discussed panel testing, which would involve testing 77 genes associated with increased cancer risk. The implications of a positive or negative test result were discussed.  We also discussed the importance of testing an affected relative and how a negative result for Ms. Watson wouldn’t necessarily mean the cancers presenting in her family weren’t due to a genetic mutation that Ms. Watson did not inherit.  In general, a negative genetic test result is most informative if a mutation has first been established in an affected member of the family.  In cases where an affected individual is not available or interested in testing, it is appropriate to offer testing to an unaffected individual. We discussed the possibility that, in some cases, genetic test results may be ambiguous due to the identification of a genetic variant. These variants may or may not be associated with an increased cancer risk. With multigene panel testing, it is not uncommon for a variant of uncertain significance (VUS) to be identified.  If a VUS is identified, testing family members is typically not  recommended and screening recommendations are made based on the family history.  The laboratories that perform genetic testing work to reclassify the VUS and send out an amended report if and when a VUS is reclassified.  The majority of variant findings are ultimately reclassified to a negative result. Given Ms. Watson’s family history, a negative test result does not eliminate all cancer risk, although the risk would not be as high as it would with positive genetic testing.     TEST RESULTS:  Genetic testing was positive for one pathogenic mutation ([id number]) in the MUTYH gene (see attached results).  The presence of one MUTYH mutation indicates that Ms. Watson does NOT have MUTYH-Associated Polyposis (MAP), but is a carrier.  Unlike most hereditary cancer syndromes, MAP is inherited in an autosomal recessive manner; therefore, Ms. Watson being identified as having one MUTYH mutation does not place her at the significantly increased cancer risks that are seen when someone has two mutations, one in each copy of the gene.  The risk of colon cancer in individuals with one MUTYH mutation is unclear. Those individuals with a first-degree relative with colon cancer may have between a 10-13% risk while those irrespective to family history may be at a 6-7% risk.  Ms. Watson does not have a reported family history of colon cancer so her risk is most likely in the lower range.    Based on Ms. Watson’s test results, her siblings and her son each have a 50% chance to have inherited the MUTYH mutation identified. The general population carrier frequency is 1-2%; therefore, it is possible for Ms. Watson’s family members to have inherited an additional mutation from the other parent. Therefore, rather than just performing single site analysis for the identified mutation, full MUTYH analysis is indicated for family members. Genetic counseling and testing could be considered for Ms. Watson’s family members.  For individuals found  to have two MUTYH mutations, screening colonoscopy begins at age 25.  Genetic testing for adult onset conditions is not recommended before age 18. We would be happy to see family members who live in the area in our clinic to further discuss this information and testing options. Relatives can call our office at [phone] for assistance in scheduling an appointment; however, a physician referral to our office will prompt our coordinator to contact patients to schedule an appointment.  For family members who live elsewhere, there are genetic counselors at most [facility]. They can find a genetic counselor by visiting the National Society of Genetic Counselors website at www.nsgc.org.  Relatives would need a copy of Ms. Watson’s genetic test result to ensure they were being tested for the correct gene.    SURVEILLANCE:  Per current NCCN guidelines, individuals who are carriers of one MUTYH mutation and have a family history of colon cancer in a first-degree relative should begin screening colonoscopy at age 40 or ten years prior to the youngest colorectal cancer diagnosis in the family and repeated every five years.  Ms. Watson does not have a known history of colon cancer in her family. General population screening colonoscopy is recommended.    At this time, Ms. Hutsons lifetime risk of developing breast cancer should be assessed using family history-based risk assessment models that take into account personal history factors (age at menarche, parity, etc.) and family history information. Using these computer models, Ms. Hutsons lifetime breast cancer risk is estimated to be up to 3.1% (EMILY/Deysi), compared to the general population risk of 12.5%. In general, a lifetime risk above 20% is considered to be “high risk” where increased screening is warranted; Ms. Harvey risk does not fall into that category.    Options available to individuals with an elevated lifetime risk for breast and/or  ovarian cancer were briefly discussed.  Based on computer modeling, Ms. Mills’s lifetime risk for breast cancer would not be considered “high risk” (>20%).   Per NCCN guidelines, it is appropriate for her to follow general population screening guidelines for her breast cancer risk including annual clinical breast exam and annual mammography. These assessments are based on the information provided at the time of consultation.    PLAN: Genetic counseling remains available to Ms. Watson and her family. If she has any questions in the future, she is welcome to call me at [phone].      Helene Melo MS, [facility] [place], [facility]  Licensed Certified Genetic Counselor      Cc: Stefani Drummond MD

## 2022-10-03 DIAGNOSIS — Z86.010 HISTORY OF COLON POLYPS: ICD-10-CM

## 2022-10-03 DIAGNOSIS — Z12.11 ENCOUNTER FOR SCREENING COLONOSCOPY: Primary | ICD-10-CM

## 2022-10-19 PROBLEM — Z12.11 ENCOUNTER FOR SCREENING COLONOSCOPY: Status: ACTIVE | Noted: 2022-10-19

## 2022-10-19 PROBLEM — Z86.0100 HISTORY OF COLON POLYPS: Status: ACTIVE | Noted: 2022-10-19

## 2022-10-19 PROBLEM — Z86.010 HISTORY OF COLON POLYPS: Status: ACTIVE | Noted: 2022-10-19

## 2022-10-31 DIAGNOSIS — N83.202 BILATERAL OVARIAN CYSTS: Primary | ICD-10-CM

## 2022-10-31 DIAGNOSIS — N83.201 BILATERAL OVARIAN CYSTS: Primary | ICD-10-CM

## 2022-11-01 ENCOUNTER — HOSPITAL ENCOUNTER (OUTPATIENT)
Dept: ULTRASOUND IMAGING | Facility: HOSPITAL | Age: 72
Discharge: HOME | End: 2022-11-01
Admitting: OBSTETRICS & GYNECOLOGY

## 2022-11-01 DIAGNOSIS — N83.202 BILATERAL OVARIAN CYSTS: ICD-10-CM

## 2022-11-01 DIAGNOSIS — N83.201 BILATERAL OVARIAN CYSTS: ICD-10-CM

## 2022-11-01 PROCEDURE — 76856 US EXAM PELVIC COMPLETE: CPT

## 2022-11-01 PROCEDURE — 76830 TRANSVAGINAL US NON-OB: CPT

## 2022-11-01 RX ORDER — PRAVASTATIN SODIUM 40 MG
40 TABLET ORAL DAILY
Qty: 90 TABLET | Refills: 3 | Status: SHIPPED | OUTPATIENT
Start: 2022-11-01

## 2022-11-07 ENCOUNTER — OFFICE VISIT (OUTPATIENT)
Dept: GYNECOLOGIC ONCOLOGY | Facility: CLINIC | Age: 72
End: 2022-11-07

## 2022-11-07 VITALS
RESPIRATION RATE: 16 BRPM | DIASTOLIC BLOOD PRESSURE: 86 MMHG | BODY MASS INDEX: 33.12 KG/M2 | SYSTOLIC BLOOD PRESSURE: 129 MMHG | HEART RATE: 72 BPM | WEIGHT: 211 LBS | TEMPERATURE: 98 F | HEIGHT: 67 IN | OXYGEN SATURATION: 97 %

## 2022-11-07 DIAGNOSIS — N83.202 BILATERAL OVARIAN CYSTS: Primary | ICD-10-CM

## 2022-11-07 DIAGNOSIS — N83.201 BILATERAL OVARIAN CYSTS: Primary | ICD-10-CM

## 2022-11-07 DIAGNOSIS — D39.12 NEOPLASM OF UNCERTAIN BEHAVIOR OF LEFT OVARY: ICD-10-CM

## 2022-11-07 PROCEDURE — 99213 OFFICE O/P EST LOW 20 MIN: CPT | Performed by: OBSTETRICS & GYNECOLOGY

## 2022-11-07 NOTE — PROGRESS NOTES
Age: 72 y.o.  Sex: female  :  1950  MRN: [id number]       REFERRING PHYSICIAN: No ref. provider found       Stefani Watson presents to [facility] for TVUS follow up monitoring for bilateral ovarian cysts. Normal . Sister with h/o breast and ovarian ca - negative BRCA.       Oncology/Hematology History Overview Note   Stefani Watson is a 71 y.o. female referred by Dr. Akhil Zuniga ([facility]) for bilateral ovarian cysts.     • 3/21/22: TVUS - Ut av, EL = 0.3cm, small michael ov cysts, no free fluid.  • 22: TVUS - 2 mm endometrial stripe.  Same 2.2 cm simple left ovarian cyst.  Compared to previous ultrasounds right cyst still only around 3 cm in size but now appears complex with questionable hydrosalpinx adjacent to it.  • 01: TVUS-The endometrial echo is 1 mm and linear. 10 mm      nabothian cyst.The left ovary measures 3.0 x 2.3 x 2.7 cm. There is a simple left ovarian cyst measuring 2.9 x 2.4 x 2.3 cm. No abnormality to suggest hydrosalpinx.           The right ovary measures 3.0 x 2.1 x 2.1 cm. There is a right ovarian        cyst within internal septation measuring 2.1 x 1.8 x 1.4 cm.           No free fluid is demonstrated within the pelvis. The remainder is        unremarkable.  • 2022: US-  The uterus measures 5.4 cm in length, 2.1 cm AP and 3.2 cm transverse. The endometrial echo is 1 mm and linear. 10 mm nabothian cyst.        Left ovary measures 3.0 x 2.3 x 2.7 cm. There is a simple left ovarian cyst measuring 2.9 x 2.4 x 2.3 cm. No abnormality to suggest hydrosalpinx.          The right ovary measures 3.0 x 2.1 x 2.1 cm. There is a ight ovarian cyst with internal septation measuring 2.1 x 1.8 x 1.4 cm.          No free fluid demonstrated within the Pelvis.The remainder is unremarkable.            22: Follow Up/ US Results        Lab Results   Component Value Date     17.9 2022             Past Medical History:  Past Medical History:    Diagnosis Date   • HL (hearing loss)    • Hyperlipidemia    • Hypothyroidism     Thyroid removed   • Thyroid cancer (HCC)        Past Surgical History:  Past Surgical History:   Procedure Laterality Date   • CATARACT EXTRACTION, BILATERAL     • EYE SURGERY      Cataracts   • LAPAROSCOPIC TUBAL LIGATION     • ROTATOR CUFF REPAIR Left    • STAPEDECTOMY Right    • THYROIDECTOMY     • TUBAL ABDOMINAL LIGATION          MEDICATIONS:    Current Outpatient Medications:   •  calcitriol (ROCALTROL) 0.25 MCG capsule, Take 1 capsule by mouth Daily., Disp: 90 capsule, Rfl: 1  •  citalopram (CeleXA) 20 MG tablet, TAKE 1 TABLET BY MOUTH DAILY, Disp: 90 tablet, Rfl: 1  •  latanoprost (XALATAN) 0.005 % ophthalmic solution, USE 1 DROP IN BOTH EYES AT BEDTIME, Disp: , Rfl:   •  levothyroxine (SYNTHROID, LEVOTHROID) 125 MCG tablet, TAKE 1 TABLET BY MOUTH EVERY MORNING, Disp: 90 tablet, Rfl: 1  •  MAGNESIUM OXIDE PO, Take 64 mg by mouth Daily. Takes 4 tablets daily, Disp: , Rfl:   •  meloxicam (Mobic) 7.5 MG tablet, Take 1 tablet by mouth Daily As Needed (joint pain). (Patient taking differently: Take 1 tablet by mouth As Needed (joint pain).), Disp: 90 tablet, Rfl: 0  •  pravastatin (PRAVACHOL) 40 MG tablet, TAKE 1 TABLET BY MOUTH DAILY, Disp: 90 tablet, Rfl: 3  •  coenzyme Q10 100 MG capsule, Take 100 mg by mouth Daily., Disp: , Rfl:   •  methylPREDNISolone (MEDROL) 4 MG dose pack, Take as directed on package instructions., Disp: 21 tablet, Rfl: 0    ALLERGIES:  Allergies   Allergen Reactions   • Blue Dyes (Parenteral) Rash   • Simvastatin Myalgia         ROS:  CONSTITUTIONAL:  Denies fever or chills.   NEUROLOGIC:  Denies headache, focal weakness or sensory changes.   EYES:  Denies change in visual acuity.  HEENT:  Denies nasal congestion or sore throat.   RESPIRATORY:  Denies cough or shortness of breath.   CARDIOVASCULAR:  Denies chest pain or edema.   GI:  Denies abdominal pain, nausea, vomiting, bloody stools or diarrhea.   :  " Denies dysuria, leaking or incontinence.  MUSCULOSKELETAL:  Denies back pain or joint pain.   INTEGUMENT:  Denies rash.   ENDOCRINE:  Denies polyuria or polydipsia.   LYMPHATIC:  Denies swollen glands or lymphedema.   PSYCHIATRIC:  Denies depression or anxiety.      PHYSICAL EXAM:  Vitals:    11/07/22 1113   BP: 129/86   Pulse: 72   Resp: 16   Temp: 98 °F (36.7 °C)   TempSrc: Oral   SpO2: 97%   Weight: 95.7 kg (211 lb)   Height: 170 cm (66.93\")   PainSc: 0-No pain     Body mass index is 33.12 kg/m².  Current Status 11/7/2022   ECOG score 0     PHQ-9 Total Score:         GEN: alert and oriented x 3, normal affect, well nourished and hydrated.  CARDIO: regular rate and rhythm.  PULM: Lungs CTA b.l, no RRW   ABD:Soft, nontender, nondistended.   GYN: .defer today   EXT: No petechiae, bruising, rash, candida. No CCE.           Result Review :  The pertinent labs, images, and/or pathology as noted in the oncology history were reviewed independently and discussed with the patient.   Hazel Drummond DO   11/07/2022    [facility]:   ca125 17.9 3/2022    [facility]:  TVUS reviewed           ASSESSMENT :  • Bilateral ovarian cysts- Left 2.2cm, Right 3cm. Normal   • Minimal change on 11/1/22 sono   • Family hx breast and ovarian cancer - sister negative BRCA   • MUTYH gene heterozygous mutation         PLAN :  Today we discussed options for management, I discussed that I am not overtly concerned for malignancy but cannot determine this with 100% certainty unless it is evaluated by a pathologist.   Offered to manage with observation with TVUS and  - pt is amenable to this approach. 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

## 2022-11-17 ENCOUNTER — HOSPITAL ENCOUNTER (OUTPATIENT)
Dept: BONE DENSITY | Facility: HOSPITAL | Age: 72
Discharge: HOME | End: 2022-11-17
Admitting: FAMILY MEDICINE

## 2022-11-17 DIAGNOSIS — Z78.0 POSTMENOPAUSAL: ICD-10-CM

## 2022-11-17 PROCEDURE — 77080 DXA BONE DENSITY AXIAL: CPT

## 2022-11-28 RX ORDER — CITALOPRAM 20 MG/1
20 TABLET ORAL DAILY
Qty: 90 TABLET | Refills: 1 | Status: SHIPPED | OUTPATIENT
Start: 2022-11-28

## 2022-12-12 RX ORDER — LEVOTHYROXINE SODIUM 0.12 MG/1
125 TABLET ORAL
Qty: 90 TABLET | Refills: 1 | Status: SHIPPED | OUTPATIENT
Start: 2022-12-12

## 2022-12-12 NOTE — TELEPHONE ENCOUNTER
Rx Refill Note  Requested Prescriptions     Pending Prescriptions Disp Refills   • levothyroxine (SYNTHROID, LEVOTHROID) 125 MCG tablet [Pharmacy Med Name: LEVOTHYROXINE 0.125MG (125MCG) TAB] 90 tablet 1     Sig: TAKE 1 TABLET BY MOUTH EVERY MORNING      Last office visit with prescribing clinician: 7/28/2022   Last telemedicine visit with prescribing clinician: 12/11/2022   Next office visit with prescribing clinician: 12/11/2022                         Would you like a call back once the refill request has been completed: [] Yes [] No    If the office needs to give you a call back, can they leave a voicemail: [] Yes [] No    Maryan Aguirre LPN  12/12/22, 08:15 EST

## 2023-01-09 ENCOUNTER — HOSPITAL ENCOUNTER (OUTPATIENT)
Facility: HOSPITAL | Age: 73
Setting detail: HOSPITAL OUTPATIENT SURGERY
Discharge: HOME | End: 2023-01-09
Attending: SURGERY | Admitting: SURGERY
Payer: MEDICARE

## 2023-01-09 ENCOUNTER — ANESTHESIA (OUTPATIENT)
Dept: GASTROENTEROLOGY | Facility: HOSPITAL | Age: 73
End: 2023-01-09
Payer: MEDICARE

## 2023-01-09 ENCOUNTER — TELEPHONE (OUTPATIENT)
Dept: SURGERY | Facility: CLINIC | Age: 73
End: 2023-01-09

## 2023-01-09 ENCOUNTER — ANESTHESIA EVENT (OUTPATIENT)
Dept: GASTROENTEROLOGY | Facility: HOSPITAL | Age: 73
End: 2023-01-09
Payer: MEDICARE

## 2023-01-09 VITALS
OXYGEN SATURATION: 98 % | HEIGHT: 66 IN | WEIGHT: 204 LBS | DIASTOLIC BLOOD PRESSURE: 79 MMHG | RESPIRATION RATE: 20 BRPM | HEART RATE: 52 BPM | BODY MASS INDEX: 32.78 KG/M2 | SYSTOLIC BLOOD PRESSURE: 162 MMHG

## 2023-01-09 DIAGNOSIS — Z86.010 HISTORY OF COLON POLYPS: ICD-10-CM

## 2023-01-09 DIAGNOSIS — Z12.11 ENCOUNTER FOR SCREENING COLONOSCOPY: ICD-10-CM

## 2023-01-09 DIAGNOSIS — K63.89 COLONIC MASS: Primary | ICD-10-CM

## 2023-01-09 PROBLEM — Z15.89 MONOALLELIC MUTATION OF MUTYH GENE: Status: ACTIVE | Noted: 2023-01-09

## 2023-01-09 PROCEDURE — 25010000002 PROPOFOL 10 MG/ML EMULSION: Performed by: NURSE ANESTHETIST, CERTIFIED REGISTERED

## 2023-01-09 PROCEDURE — 45380 COLONOSCOPY AND BIOPSY: CPT | Performed by: SURGERY

## 2023-01-09 PROCEDURE — 25010000002 GLUCAGON (RDNA) PER 1 MG: Performed by: SURGERY

## 2023-01-09 PROCEDURE — 88305 TISSUE EXAM BY PATHOLOGIST: CPT | Performed by: SURGERY

## 2023-01-09 RX ORDER — SODIUM CHLORIDE, SODIUM LACTATE, POTASSIUM CHLORIDE, CALCIUM CHLORIDE 600; 310; 30; 20 MG/100ML; MG/100ML; MG/100ML; MG/100ML
30 INJECTION, SOLUTION INTRAVENOUS CONTINUOUS PRN
Status: DISCONTINUED | OUTPATIENT
Start: 2023-01-09 | End: 2023-01-09 | Stop reason: HOSPADM

## 2023-01-09 RX ORDER — PROPOFOL 10 MG/ML
VIAL (ML) INTRAVENOUS AS NEEDED
Status: DISCONTINUED | OUTPATIENT
Start: 2023-01-09 | End: 2023-01-09 | Stop reason: SURG

## 2023-01-09 RX ORDER — LIDOCAINE HYDROCHLORIDE 20 MG/ML
INJECTION, SOLUTION INFILTRATION; PERINEURAL AS NEEDED
Status: DISCONTINUED | OUTPATIENT
Start: 2023-01-09 | End: 2023-01-09 | Stop reason: SURG

## 2023-01-09 RX ORDER — PROPOFOL 10 MG/ML
VIAL (ML) INTRAVENOUS CONTINUOUS PRN
Status: DISCONTINUED | OUTPATIENT
Start: 2023-01-09 | End: 2023-01-09 | Stop reason: SURG

## 2023-01-09 RX ADMIN — LIDOCAINE HYDROCHLORIDE 60 MG: 20 INJECTION, SOLUTION INFILTRATION; PERINEURAL at 11:05

## 2023-01-09 RX ADMIN — PROPOFOL 120 MCG/KG/MIN: 10 INJECTION, EMULSION INTRAVENOUS at 11:05

## 2023-01-09 RX ADMIN — SODIUM CHLORIDE, POTASSIUM CHLORIDE, SODIUM LACTATE AND CALCIUM CHLORIDE 30 ML/HR: 600; 310; 30; 20 INJECTION, SOLUTION INTRAVENOUS at 10:18

## 2023-01-09 RX ADMIN — Medication 100 MG: at 11:10

## 2023-01-09 NOTE — OP NOTE
Colonoscopy Procedure Note  Stefani Watson  1950  Date of Procedure: 01/09/23    Pre-operative Diagnosis:    · Screening  · one mutation of MUTYH gene, putting her at slightly increased risk of colon cancer, since heterozygous.    Post-operative Diagnosis:  · Large ileocecal valve polyp, 1/3 circumferential, 3 cm in length wrapping around the area opposite the valve, friable, non ulcerated, flat with some lobulations.  Biopsied with cold biopsy forceps  · Sigmoid diverticulosis    Procedure: Colonoscopy with cold biopsy        Recommendations:   · Laparoscopic right hemicolectomy.  Will need office visit prior.  The office will call within the next  3-10 days with a final recommendation.  · Keep a copy of the photographs of the procedure given to you today for possible need for reference in the future.  · CT abdo and pelvis with oral and IV contrast.  · CEA level today, CBC, CMP, EKG, CXR      Surgeon: Eran    Anesthetic: MAC per Domenica Vega MD    Scope Withdrawal Time:  6 minutes  14 seconds    Procedure Details     MAC anesthesia was induced.  The 180 Colonoscopy was inserted blindly into the rectum and advanced to the cecum, with relative ease,  without need for pressure, lift, or turning.    Cecum was identified by the appendiceal orifice and the ileocecal valve and photographed for documentation.      Prep quality was excellent.  A careful inspection was made as the scope was withdrawn, including a retroflexed view of the rectum; there was no suggestion of presence of angiodysplasias, or colitis, but there was the large polyp and the diverticula, with noted interventions. Resection will be necessary.      Retroflexion in the rectum revealed no abnormalities.      Norma Barillas MD  01/09/23

## 2023-01-09 NOTE — H&P
Cc: Endoscopy Visit    HPI: 72 y.o. female here for screening with one mutation of MUTYH gene, putting her at slightly increased risk of colon cancer, since heterozygous.      Past Medical History:   Diagnosis Date   • Cancer (HCC)    • Disease of thyroid gland    • History of colon polyps    • HL (hearing loss)    • Hyperlipidemia        Past Surgical History:   Procedure Laterality Date   • CATARACT EXTRACTION, BILATERAL     • EYE SURGERY      Cataracts   • LAPAROSCOPIC TUBAL LIGATION     • ROTATOR CUFF REPAIR Left    • STAPEDECTOMY Right    • THYROIDECTOMY     • TUBAL ABDOMINAL LIGATION         is allergic to blue dyes (parenteral) and simvastatin.       Medication List      ASK your doctor about these medications    calcitriol 0.25 MCG capsule  Commonly known as: ROCALTROL  Take 1 capsule by mouth Daily.     citalopram 20 MG tablet  Commonly known as: CeleXA  TAKE 1 TABLET BY MOUTH DAILY     latanoprost 0.005 % ophthalmic solution  Commonly known as: XALATAN     levothyroxine 125 MCG tablet  Commonly known as: SYNTHROID, LEVOTHROID  TAKE 1 TABLET BY MOUTH EVERY MORNING     MAGNESIUM OXIDE PO     meloxicam 7.5 MG tablet  Commonly known as: Mobic  Take 1 tablet by mouth Daily As Needed (joint pain).     pravastatin 40 MG tablet  Commonly known as: PRAVACHOL  TAKE 1 TABLET BY MOUTH DAILY            Family History   Problem Relation Age of Onset   • Heart disease Mother    • Cancer Sister    • Ovarian cancer Sister    • Cancer Maternal Grandmother    • Breast cancer Maternal Grandmother    • Malig Hyperthermia Neg Hx        Social History     Socioeconomic History   • Marital status:    Tobacco Use   • Smoking status: Never   • Smokeless tobacco: Never   Substance and Sexual Activity   • Alcohol use: Yes     Alcohol/week: 0.0 standard drinks     Comment: Occasionally   • Drug use: Never   • Sexual activity: Not Currently       Vitals:    01/09/23 1009   BP: 131/97   Pulse: 72   Resp: 16   SpO2: 98%       Body  mass index is 32.93 kg/m².    Physical Exam    General: No acute distress  Lungs: No labored breathing, Pulse oximetry on room air is 98%.  Heart/EKG: RRR  Abdomen: no complaints of pain  Mental:  Awake, alert, and oriented    Imp:     · Screening  · one mutation of MUTYH gene, putting her at slightly increased risk of colon cancer, since heterozygous.     Plan:  · C scope    Norma Barillas MD  11:05 EST

## 2023-01-09 NOTE — TELEPHONE ENCOUNTER
----- Message from Norma Barillas MD sent at 1/9/2023 11:35 AM EST -----  Please call her later this afternoon and set up appt for Wednesday morning, sometime between 10 and my starting time.

## 2023-01-09 NOTE — ANESTHESIA PREPROCEDURE EVALUATION
Anesthesia Evaluation     NPO Solid Status: > 8 hours  NPO Liquid Status: > 2 hours           Airway   Mallampati: II  TM distance: >3 FB  Neck ROM: full  Dental          Pulmonary    (-) COPD, sleep apnea, not a smoker  Cardiovascular   Exercise tolerance: good (4-7 METS)    Rhythm: regular  Rate: normal    (+) hyperlipidemia,   (-) past MI, angina    ROS comment: ? Estimated right ventricular systolic pressure from tricuspid regurgitation is normal (<35 mmHg).  ? Estimated left ventricular EF = 62% Left ventricular systolic function is normal.  ? Left ventricular diastolic function was normal.         Neuro/Psych  (+) psychiatric history Depression,    (-) seizures, CVA  GI/Hepatic/Renal/Endo    (+) obesity,   thyroid problem hypothyroidism  (-) liver disease, no renal disease    Musculoskeletal     Abdominal    Substance History      OB/GYN          Other   arthritis,    history of cancer remission                    Anesthesia Plan    ASA 2     MAC     intravenous induction     Anesthetic plan, risks, benefits, and alternatives have been provided, discussed and informed consent has been obtained with: patient.        CODE STATUS:

## 2023-01-09 NOTE — DISCHARGE INSTRUCTIONS
WHAT ARE DIVERTICULOSIS AND DIVERTICULITIS?  Many people have small pouches in their colons that bulge outward through weak spots, like an inner tube that pokes through weak places in a tire.  Each pouch is called a diverticulum.  The condition of having diverticula is DIVERTICULOSIS.  The condition becomes more common as people age.  About half of all people over the age of 60 have diverticulosis    When pouches become infected or inflamed, the condition is called DIVERTICULITIS.  This happens in 10% to 25% of people with diverticulosis.  Diverticulosis and diverticulitis are also called DIVERTICULAR DISEASE.     WHAT ARE THE SYMPTOMS?  Diverticulosis - Most people do not have any discomfort or symptoms.  However, symptoms may include mild cramps, bloating, and constipation.  Other diseases such as irritable bowel syndrome (IBS) and stomach ulcers cause similar problems, so these symptoms do not always mean a person has diverticulosis.  You should visit your doctor if you have these troubling symptoms.    Diverticulitis - The most common symptom is abdominal pain.  The most common sign is tenderness around the left side of the lower abdomen.  If infection is the cause, fever, nausea, vomiting, chills, cramping, and constipation may occur as well.  The severity depends on the extent of the infection and complications.    WHAT ARE THE COMPLICATIONS?  Diverticulitis can lead to bleeding, infections,perforations or tears, or blockages.  These complications always require treatment to prevent them from proggressing and causing serous illness.    Bleeding from a diverticula is a rare complication.  When this occurs, blood may appear in the toilet or in your stool.  Bleeding can be severe, but it may stop by itself and not require treatment.  Doctors believe bleeding diverticula are caused by a small blood vessel in a diverticulum that weakens and finally bursts.  If you have bleeding from the rectum, you should see your  doctor.  If the bleeding does not stop you may need surgery.    Abscess, Perforation, and Peritonitis - The infection causing diverticulitis often clears up after a few days of treatment with antibiotics.  If the condition gets worse, an abscess may form in the colon.  An abscess is an infected area with pus that may cause swelling and destroy tissue.  Sometimes the infected diverticula may develop small holes, called perforations.  These perforations allow pus to leak out of the colon into the abdominal area.  If the abscess is small and remains in the colon, it may clear up after treatment with antibiotics.  If not, the doctor may need to drain it.  A large abscess can become a serious problem if the infection leaks out and contaminates areas outside the colon.  Infection that spreads into the abdominal cavity is called peritonitis.  Peritonitis requires immediate surgery toclean the abdominal cavity and remove the damaged part of the colon.  Without surgery, peritonitis can be fatal.    FISTULA  A fistula is an abnormal connection of tissue between two organs or between an organ and the skin.  When damaged tissues come into contact with each other during infection, they sometimes stick together.  If they heal that way, a fistula forms.  When diverticulitis-related infection spreads through out the colon, the colon's tissue may stick to nearby tissues.  The organs usually involved are the bladder, small intestine, and skin.  The problem can be corrected with surgery to remove the fistula and affected part of the colon.    INTESTINAL OBSTRUCTION  The scarring caused by infection may cause partial or total blockage of the large intestine.  When this happens, the colon is unable to move bowel contents normally.  When the obstruction totally blocks the intestine, emergency surgery is necessary.  Partial blockage is not an emergency, so the surgery to correct it can be planned.    WHAT CAUSES DIVERTICULAR  DISEASE  Although not proven, the dominant theory is that a low-fiber diet is the main cause of diverticular disease.  The disease was first noticed in the [place] in the early 1900s.  At about the same time, processed foods were introduced into the American diet.  Many processed foods contain refined, low-fiber flour.  Unlike whole-wheat flour, refined flour has no wheat bran.    Diverticular disease is common in developed or industrialized countries-particularly the [place], [place], and [place]-where low-fiber diets are common.  The disease is rare in countries of [place], where people eat high-fiber vegetable diets.    Fiber is the part of fruits, vegetables, and whole grains that the body cannot digest.  Some fiber dissolves easily in water (soluble fiber).  It takes on a soft, jelly-like texture in the intestines.  Some fiber passes almost unchanged through the intestines (insoluble fiber).  Both kinds of fiber help make stools soft and easy to pass.  Fiber also prevents constipation.    Constipation makes the muscles strain to move stool that is too hard.  It is the main cause of increased pressure in the colon.  This excess pressure might cause the weak spots in the colon to bulge out and become diverticula.  Diverticulitis occurs when diverticula become infected or inflamed.  Doctors are not certain what causes the infection.  It may begin when stool or bacteria are caught in the diverticula.  An attack of diverticulitis can develop suddenly and without warning.    HOW DOES THE DOCTOR DIAGNOSE DIVERTICULAR DISEASE  The doctor asks about medical history, does a physical exam, and may perform one or more diagnostic tests.  Because most people do not have symptoms, diverticulosis is often found through tests ordered for another ailment.    When taking a medical history, the doctor may ask about bowel habits, symptoms, pain, diet, and medications.  The physical exam usually involves a  digital rectal exam.  To preform this test. The doctor inserts a gloved, lubricated finger into the rectum to detect tenderness, blockage, or blood.  The doctor may check stool for signs of bleeding and test blood for signs of infection.  The doctor may also order x-rays or other tests.    WHAT IS THE TREATMENT FOR DIVERTICULAR DISEASE  Increasing the amount of fiber in the diet may reduce symptoms of diverticulosis and prevent complications such as diverticulitis.  Fiber keeps stool soft and lowers pressure inside the colon so that bowel contents can move through easily.  The American Dietetic Association. Recommends 20 to 35 grams of fiber each day.  The doctor may also recommend taking a fiber product such as Citrucel or Metamucil once a dya.  These products are mixed water and provide about 2 to 3.5 grams of fiber per  Tablespoon, mixed with 8 ounces of water.    Avoidance of nuts, popcorn, and sunflower, pumpkin, jaren, and sesame seeds has been recommended by physicians out of fear that food particles could enter, block, or irritate the diverticula.  However, no scientific data support this treatment measure.  Eating a high-fiber diet is the only requirement highly emphasized across the medical literature.  Eliminating specific foods is not necessary.  The seeds in tomatoes, zucchini, cucumbers, strawberries, and raspberries, as well as poppy seeds, are generally considered harmless.  People differ in amounts and types of foods the can eat.  Decisions about diet should be made based on what works best for each person.  Keeping a food diary may help identify what foods may cause symptoms.    If cramps, bloating, and constipation are problems, the doctor may prescribe  Short course of pain medication.  However, many medications affect emptying of the colon, an undesirable side effect for people with diverticulosis.    DIVERTICULITIS  Treatment focuses on clearing up the infection and inflammation, resting the  colon, and preventing or minimizing complications.  An attack of diverticulitis without complications may respond to antibiotics within a few days if treated early.  To help the colon rest, the doctor may recommend bed rest and a liquid diet, along with a pain reliever.    An acute attack with severe pain or sever infection may require a hospital stay.  Most acute cases of diverticulitis are treated with antibiotics and a liquid diet.  The antibiotics are given by injection into a vein.  In some cases, however, surgery may be necessary.    WHEN IS SURGERY NECESSARY  If attacks are severe or frequent, the doctor may advise surgery.  The surgeon removes the affected part of the colon and joins the remaining sections.  This typed of surgery, called colon resection, aims to keep attacks from coming back and to prevent complications.  The doctor may also recommend surgery for complications of a fistula or intestinal obstruction.    If antibiotics do not correct an attack, emergency surgery may be required.  Other reasons for emergency surgery include a large abscess, perforation, peritonitis, or continued bleeding.    Emergency surgery usually involves 2 operations.  The first will clear the infected abdominal cavity and remove part of the colon.  Because infection and sometimes obstruction, it is not safe to rejoin the colon during the first operation.  Instead, the surgeon creates a temporary hole, or stoma, in the abdomen.  The end of the colon is connected to the hole, a procedure called a colostomy, to allow normal eating and bowel movements.  The stool goes into a bag attached to the opening in the abdomen.  In the second operation, the surgeon rejoins the ends of the colon.

## 2023-01-10 LAB
LAB AP CASE REPORT: NORMAL
PATH REPORT.FINAL DX SPEC: NORMAL
PATH REPORT.GROSS SPEC: NORMAL

## 2023-01-11 ENCOUNTER — PREP FOR SURGERY (OUTPATIENT)
Dept: OTHER | Facility: HOSPITAL | Age: 73
End: 2023-01-11
Payer: MEDICARE

## 2023-01-11 ENCOUNTER — OFFICE VISIT (OUTPATIENT)
Dept: SURGERY | Facility: CLINIC | Age: 73
End: 2023-01-11
Payer: MEDICARE

## 2023-01-11 VITALS — BODY MASS INDEX: 33.59 KG/M2 | HEIGHT: 66 IN | WEIGHT: 209 LBS

## 2023-01-11 DIAGNOSIS — C73 PRIMARY MALIGNANT NEOPLASM OF THYROID GLAND: ICD-10-CM

## 2023-01-11 DIAGNOSIS — D37.4 NEOPLASM OF UNCERTAIN BEHAVIOR OF COLON: ICD-10-CM

## 2023-01-11 DIAGNOSIS — K63.89 COLONIC MASS: Primary | ICD-10-CM

## 2023-01-11 DIAGNOSIS — Z15.89 MONOALLELIC MUTATION OF MUTYH GENE: ICD-10-CM

## 2023-01-11 DIAGNOSIS — N83.202 BILATERAL OVARIAN CYSTS: ICD-10-CM

## 2023-01-11 DIAGNOSIS — N83.209 OVARIAN CYSTIC MASS: Primary | ICD-10-CM

## 2023-01-11 DIAGNOSIS — N83.201 BILATERAL OVARIAN CYSTS: ICD-10-CM

## 2023-01-11 PROBLEM — K63.5 COLON POLYP: Status: ACTIVE | Noted: 2023-01-11

## 2023-01-11 PROBLEM — Z80.41 FAMILY HISTORY OF OVARIAN CANCER: Status: ACTIVE | Noted: 2023-01-11

## 2023-01-11 PROBLEM — Z80.3 FAMILY HISTORY OF BREAST CANCER: Status: ACTIVE | Noted: 2023-01-11

## 2023-01-11 PROCEDURE — 99215 OFFICE O/P EST HI 40 MIN: CPT | Performed by: SURGERY

## 2023-01-11 RX ORDER — NEOMYCIN SULFATE 500 MG/1
1000 TABLET ORAL 3 TIMES DAILY
Qty: 6 TABLET | Refills: 0 | Status: SHIPPED | OUTPATIENT
Start: 2023-01-11 | End: 2023-01-12

## 2023-01-11 RX ORDER — ALVIMOPAN 12 MG/1
12 CAPSULE ORAL 2 TIMES DAILY
Status: CANCELLED | OUTPATIENT
Start: 2023-02-09 | End: 2023-02-14

## 2023-01-11 RX ORDER — GABAPENTIN 300 MG/1
300 CAPSULE ORAL 3 TIMES DAILY
Status: CANCELLED | OUTPATIENT
Start: 2023-02-09

## 2023-01-11 RX ORDER — CLARITHROMYCIN 500 MG/1
TABLET, COATED ORAL
Qty: 3 TABLET | Refills: 0 | Status: ON HOLD | OUTPATIENT
Start: 2023-01-11 | End: 2023-02-09

## 2023-01-11 RX ORDER — CEFAZOLIN SODIUM 2 G/100ML
2 INJECTION, SOLUTION INTRAVENOUS ONCE
Status: CANCELLED | OUTPATIENT
Start: 2023-02-09 | End: 2023-01-11

## 2023-01-11 RX ORDER — SCOLOPAMINE TRANSDERMAL SYSTEM 1 MG/1
1 PATCH, EXTENDED RELEASE TRANSDERMAL CONTINUOUS
Status: CANCELLED | OUTPATIENT
Start: 2023-02-09 | End: 2023-02-12

## 2023-01-11 RX ORDER — CELECOXIB 200 MG/1
200 CAPSULE ORAL EVERY 12 HOURS SCHEDULED
Status: CANCELLED | OUTPATIENT
Start: 2023-02-09

## 2023-01-11 RX ORDER — CHLORHEXIDINE GLUCONATE 4 G/100ML
SOLUTION TOPICAL 2 TIMES DAILY
Qty: 236 ML | Refills: 0 | Status: ON HOLD | OUTPATIENT
Start: 2023-01-11 | End: 2023-02-09

## 2023-01-11 RX ORDER — ACETAMINOPHEN 500 MG
1000 TABLET ORAL EVERY 6 HOURS
Status: CANCELLED | OUTPATIENT
Start: 2023-02-09

## 2023-01-11 RX ORDER — METRONIDAZOLE 500 MG/100ML
500 INJECTION, SOLUTION INTRAVENOUS ONCE
Status: CANCELLED | OUTPATIENT
Start: 2023-02-09 | End: 2023-01-11

## 2023-01-11 NOTE — PROGRESS NOTES
SURGERY  Stefani Watson   1950 01/11/23    Chief Complaint: Endoscopically unresectable carpet polyp ileocecal valve, tubulovillous adenoma    HPI    Ms. Watson is a very nice 72 y.o. 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  This extended over an area at least 3 cm in length, third circumferential, was flat with some lobulations.  She also had some sigmoid diverticulosis.    She presents today with her son, to discuss my recommended laparoscopic right hemicolectomy.  She has had a history of thyroid cancer and has a sister with breast and ovarian cancer prompting the genetic testing.  She also has bilateral ovarian cyst, and has been contemplating resection of those.  She asked today if I can do the ovarian resection at the same time and this would make her and her son both very happy.    She is fairly hard of hearing but can hear if a lot of attention is taken to decibel level and directed speech.    She is on calcitriol for parathyroid injury suffered during the time of her thyroid cancer surgery.    We discussed her son's need to have colonoscopies now.  I also talked to him about perhaps getting genetic testing.  He prefers not to have genetic testing but just undergo endoscopies every 2 years.  He has seen Dr. Lopez in the past and I have sent a message to him inquiring if he would be amenable to doing so every 2 years.    Past Medical History:   Diagnosis Date   • Cancer (HCC)    • Disease of thyroid gland    • History of colon polyps    • HL (hearing loss)    • Hyperlipidemia      Past Surgical History:   Procedure Laterality Date   • CATARACT EXTRACTION, BILATERAL     • COLONOSCOPY N/A 1/9/2023    Procedure: COLONOSCOPY to cecum with biopsy of ileo cecal valve polyp;  Surgeon: Norma Barillas MD;  Location: [facility] ENDOSCOPY;  Service:  "General;  Laterality: N/A;  PRE - screening  POST - diverticulosis, ileo cecal valve polyp   • EYE SURGERY      Cataracts   • LAPAROSCOPIC TUBAL LIGATION     • ROTATOR CUFF REPAIR Left    • STAPEDECTOMY Right    • THYROIDECTOMY     • TUBAL ABDOMINAL LIGATION       Family History   Problem Relation Age of Onset   • Heart disease Mother    • Cancer Sister    • Ovarian cancer Sister    • Cancer Maternal Grandmother    • Breast cancer Maternal Grandmother    • Malig Hyperthermia Neg Hx      Social History     Socioeconomic History   • Marital status:    Tobacco Use   • Smoking status: Never   • Smokeless tobacco: Never   Substance and Sexual Activity   • Alcohol use: Yes     Alcohol/week: 0.0 standard drinks     Comment: Occasionally   • Drug use: Never   • Sexual activity: Not Currently         Current Outpatient Medications:   •  calcitriol (ROCALTROL) 0.25 MCG capsule, Take 1 capsule by mouth Daily., Disp: 90 capsule, Rfl: 1  •  citalopram (CeleXA) 20 MG tablet, TAKE 1 TABLET BY MOUTH DAILY, Disp: 90 tablet, Rfl: 1  •  latanoprost (XALATAN) 0.005 % ophthalmic solution, USE 1 DROP IN BOTH EYES AT BEDTIME, Disp: , Rfl:   •  levothyroxine (SYNTHROID, LEVOTHROID) 125 MCG tablet, TAKE 1 TABLET BY MOUTH EVERY MORNING, Disp: 90 tablet, Rfl: 1  •  MAGNESIUM OXIDE PO, Take 64 mg by mouth Daily. Takes 4 tablets daily, Disp: , Rfl:   •  meloxicam (Mobic) 7.5 MG tablet, Take 1 tablet by mouth Daily As Needed (joint pain). (Patient taking differently: Take 7.5 mg by mouth As Needed (joint pain).), Disp: 90 tablet, Rfl: 0  •  pravastatin (PRAVACHOL) 40 MG tablet, TAKE 1 TABLET BY MOUTH DAILY, Disp: 90 tablet, Rfl: 3    Allergies   Allergen Reactions   • Blue Dyes (Parenteral) Rash   • Simvastatin Myalgia       PHYSICAL EXAM:    Ht 167.6 cm (66\")   Wt 94.8 kg (209 lb)   BMI 33.73 kg/m²   Body mass index is 33.73 kg/m².    Constitutional: well developed, well nourished, appears  healthy, stated age  ENMT: Hearing " diminished, neck without masses  CVS: RRR, no murmur  Respiratory: CTA, normal respiratory effort   Gastrointestinal: abdomen rounded, depth of abdominal wall anticipated 4 cm except right at the umbilicus where there is a fairly deep crease, abdominal hernia not detected, incisional scar below the umbilicus where a tubal ligation has been carried out  Musculoskeletal: gait normal, muscle mass normal  Neurological: awake and alert, seems to have reasonable capacity for understanding for medical decision making  Psychiatric: appears to have reasonable judgement, pleasant    Radiographic/Lab Findings:   CEA last March normal, pathology from Monday tubulovillousadenoma    Reviewed: Colon resection pamphlet, including risk of leak, bleeding, infection, organ injury.  Described the need to take the lymph nodes out in the event there is cancer and did tell her there is a 25% chance that there will be cancer within the specimen despite the initial pathology being negative, based on the size and potential sampling error    IMPRESSION:  · Endoscopically unresectable 3 cm carpet polyp ileocecal valve tubulovillous adenoma  · Body mass index is 33.73 kg/m².  · Sister with history of ovarian cancer  · Sister with history of breast cancer  · MUTYH single allele mutation  · Hypoparathyroidism, postsurgical, on calcitriol  · History of thyroid cancer  · Difficulty with hearing    PLAN:  · CEA, CBC, CMP, two-view chest x-ray, EKG is part of preop  · CT scan abdomen and pelvis with oral and IV contrast to evaluate for lymph nodes  · Laparoscopic right hemicolectomy, bilateral oophorectomy, possible open.  Case request entered  · ERAS protocol and described necessity of avoiding narcotics for 1 week  · Avoid meloxicam 1 week prior  · PAT early to ensure that there are no hiccups from an abnormal EKG  · Messaged Dr. Lopez about patient's son in regards to getting a colonoscopy now.    Norma Barillas MD  12:04 PM    In order to  provide a more personal and interactive patient experience as well as improve efficiency, this note was started prior to the office visit, including review of past history and pertinent images, surgeries.    Greater than 50 minutes spent in preparing for visit, evaluating endoscopic photos, describing procedure, reviewing chart, discussing with son and arranging postoperative coordination of care at home    ADDEND  CT with no evidence of met disease, large liver cyst, polyp seen, 6 mm lymph node.  She has a very diminutive right colic artery, sizeable ileocolic that braches at the arcade.  Middle colic is just barely off midline, root of that 5 cm above the center of the swag of the transverse colon, with the umbilicus 9 cm below that; so she is quite high.    Known ovarian masses left 3 cm (US 2.9), right cyst with septation 5.7 cm (US 2.1 cm with 3 cm ovary), so not dramatically changed.  Follow up US recommended.     CEA 3.02    Norma Barillas MD  01/25/23

## 2023-01-11 NOTE — PROGRESS NOTES
Tash (endoscopy liaison),    Please call patient tto inform them of these findings and recommendations and ensure that any pamphlets that were to be given to the patient at the hospital were received.     Please make sure a letter is sent to the patient, recall method entered into the computer and the HM (Health Maintenance) tab updated as far as need for endoscopy follow up.    Thanks  Dr Barillas    Colonoscopy Procedure Note  Stefani Watson  1950  Date of Procedure: 01/09/23     Pre-operative Diagnosis:    · Screening  · one mutation of MUTYH gene, putting her at slightly increased risk of colon cancer, since heterozygous.     Post-operative Diagnosis:  · Large ileocecal valve polyp, 1/3 circumferential, 3 cm in length wrapping around the area opposite the valve, friable, non ulcerated, flat with some lobulations.  Biopsied with cold biopsy forceps;  TUBULOVILLOUS ADENOMA  · Sigmoid diverticulosis     Procedure: Colonoscopy with cold biopsy     Recommendations:   · Laparoscopic right hemicolectomy.  Will need office visit prior.  The office will call within the next  3-10 days with a final recommendation.;  OFFICE VISIT TODAY  · Keep a copy of the photographs of the procedure given to you today for possible need for reference in the future.  · CT abdo and pelvis with oral and IV contrast.  · CEA level today, CBC, CMP, EKG, CXR

## 2023-01-20 ENCOUNTER — HOSPITAL ENCOUNTER (OUTPATIENT)
Dept: CT IMAGING | Facility: HOSPITAL | Age: 73
Discharge: HOME | End: 2023-01-20
Admitting: SURGERY
Payer: MEDICARE

## 2023-01-20 DIAGNOSIS — K63.89 COLONIC MASS: ICD-10-CM

## 2023-01-20 PROCEDURE — 74177 CT ABD & PELVIS W/CONTRAST: CPT

## 2023-01-20 PROCEDURE — 0 DIATRIZOATE MEGLUMINE & SODIUM PER 1 ML: Performed by: SURGERY

## 2023-01-20 PROCEDURE — 25010000002 IOPAMIDOL 61 % SOLUTION: Performed by: SURGERY

## 2023-01-20 PROCEDURE — 82565 ASSAY OF CREATININE: CPT

## 2023-01-20 RX ADMIN — IOPAMIDOL 100 ML: 612 INJECTION, SOLUTION INTRAVENOUS at 17:31

## 2023-01-20 RX ADMIN — DIATRIZOATE MEGLUMINE AND DIATRIZOATE SODIUM 30 ML: 660; 100 LIQUID ORAL; RECTAL at 16:25

## 2023-01-21 LAB — CREAT BLDA-MCNC: 1 MG/DL (ref 0.6–1.3)

## 2023-01-23 ENCOUNTER — PRE-ADMISSION TESTING (OUTPATIENT)
Dept: PREADMISSION TESTING | Facility: HOSPITAL | Age: 73
End: 2023-01-23
Payer: MEDICARE

## 2023-01-23 ENCOUNTER — HOSPITAL ENCOUNTER (OUTPATIENT)
Dept: GENERAL RADIOLOGY | Facility: HOSPITAL | Age: 73
Discharge: HOME | End: 2023-01-23
Payer: MEDICARE

## 2023-01-23 VITALS
TEMPERATURE: 97.9 F | SYSTOLIC BLOOD PRESSURE: 133 MMHG | HEART RATE: 82 BPM | BODY MASS INDEX: 33.64 KG/M2 | OXYGEN SATURATION: 98 % | HEIGHT: 66 IN | RESPIRATION RATE: 20 BRPM | DIASTOLIC BLOOD PRESSURE: 84 MMHG | WEIGHT: 209.3 LBS

## 2023-01-23 DIAGNOSIS — D37.4 NEOPLASM OF UNCERTAIN BEHAVIOR OF COLON: ICD-10-CM

## 2023-01-23 DIAGNOSIS — K63.89 COLONIC MASS: ICD-10-CM

## 2023-01-23 LAB
ALBUMIN SERPL-MCNC: 4.1 G/DL (ref 3.5–5.2)
ALBUMIN/GLOB SERPL: 1.6 G/DL
ALP SERPL-CCNC: 80 U/L (ref 39–117)
ALT SERPL W P-5'-P-CCNC: 9 U/L (ref 1–33)
ANION GAP SERPL CALCULATED.3IONS-SCNC: 7.9 MMOL/L (ref 5–15)
ANION GAP SERPL CALCULATED.3IONS-SCNC: 9.7 MMOL/L (ref 5–15)
AST SERPL-CCNC: 12 U/L (ref 1–32)
BASOPHILS # BLD AUTO: 0.07 10*3/MM3 (ref 0–0.2)
BASOPHILS NFR BLD AUTO: 0.8 % (ref 0–1.5)
BILIRUB SERPL-MCNC: 0.3 MG/DL (ref 0–1.2)
BUN SERPL-MCNC: 12 MG/DL (ref 8–23)
BUN SERPL-MCNC: 13 MG/DL (ref 8–23)
BUN/CREAT SERPL: 14.1 (ref 7–25)
BUN/CREAT SERPL: 15.7 (ref 7–25)
CALCIUM SPEC-SCNC: 7.9 MG/DL (ref 8.6–10.5)
CALCIUM SPEC-SCNC: 8 MG/DL (ref 8.6–10.5)
CEA SERPL-MCNC: 3.02 NG/ML
CHLORIDE SERPL-SCNC: 104 MMOL/L (ref 98–107)
CHLORIDE SERPL-SCNC: 105 MMOL/L (ref 98–107)
CO2 SERPL-SCNC: 25.3 MMOL/L (ref 22–29)
CO2 SERPL-SCNC: 26.1 MMOL/L (ref 22–29)
CREAT SERPL-MCNC: 0.83 MG/DL (ref 0.57–1)
CREAT SERPL-MCNC: 0.85 MG/DL (ref 0.57–1)
DEPRECATED RDW RBC AUTO: 38.8 FL (ref 37–54)
DEPRECATED RDW RBC AUTO: 39.5 FL (ref 37–54)
EGFRCR SERPLBLD CKD-EPI 2021: 72 ML/MIN/1.73
EGFRCR SERPLBLD CKD-EPI 2021: 74.1 ML/MIN/1.73
EOSINOPHIL # BLD AUTO: 0.07 10*3/MM3 (ref 0–0.4)
EOSINOPHIL NFR BLD AUTO: 0.8 % (ref 0.3–6.2)
ERYTHROCYTE [DISTWIDTH] IN BLOOD BY AUTOMATED COUNT: 14.5 % (ref 12.3–15.4)
ERYTHROCYTE [DISTWIDTH] IN BLOOD BY AUTOMATED COUNT: 14.7 % (ref 12.3–15.4)
GLOBULIN UR ELPH-MCNC: 2.5 GM/DL
GLUCOSE SERPL-MCNC: 100 MG/DL (ref 65–99)
GLUCOSE SERPL-MCNC: 99 MG/DL (ref 65–99)
HCT VFR BLD AUTO: 35.7 % (ref 34–46.6)
HCT VFR BLD AUTO: 35.9 % (ref 34–46.6)
HGB BLD-MCNC: 10.7 G/DL (ref 12–15.9)
HGB BLD-MCNC: 10.8 G/DL (ref 12–15.9)
IMM GRANULOCYTES # BLD AUTO: 0.02 10*3/MM3 (ref 0–0.05)
IMM GRANULOCYTES NFR BLD AUTO: 0.2 % (ref 0–0.5)
LYMPHOCYTES # BLD AUTO: 1.74 10*3/MM3 (ref 0.7–3.1)
LYMPHOCYTES NFR BLD AUTO: 20.2 % (ref 19.6–45.3)
MCH RBC QN AUTO: 22.7 PG (ref 26.6–33)
MCH RBC QN AUTO: 22.9 PG (ref 26.6–33)
MCHC RBC AUTO-ENTMCNC: 30 G/DL (ref 31.5–35.7)
MCHC RBC AUTO-ENTMCNC: 30.1 G/DL (ref 31.5–35.7)
MCV RBC AUTO: 75.8 FL (ref 79–97)
MCV RBC AUTO: 76.1 FL (ref 79–97)
MONOCYTES # BLD AUTO: 0.65 10*3/MM3 (ref 0.1–0.9)
MONOCYTES NFR BLD AUTO: 7.6 % (ref 5–12)
NEUTROPHILS NFR BLD AUTO: 6.05 10*3/MM3 (ref 1.7–7)
NEUTROPHILS NFR BLD AUTO: 70.4 % (ref 42.7–76)
NRBC BLD AUTO-RTO: 0 /100 WBC (ref 0–0.2)
PLATELET # BLD AUTO: 356 10*3/MM3 (ref 140–450)
PLATELET # BLD AUTO: 365 10*3/MM3 (ref 140–450)
PMV BLD AUTO: 10.1 FL (ref 6–12)
PMV BLD AUTO: 10.2 FL (ref 6–12)
POTASSIUM SERPL-SCNC: 4 MMOL/L (ref 3.5–5.2)
POTASSIUM SERPL-SCNC: 4.1 MMOL/L (ref 3.5–5.2)
PROT SERPL-MCNC: 6.6 G/DL (ref 6–8.5)
QT INTERVAL: 459 MS
RBC # BLD AUTO: 4.71 10*6/MM3 (ref 3.77–5.28)
RBC # BLD AUTO: 4.72 10*6/MM3 (ref 3.77–5.28)
SODIUM SERPL-SCNC: 138 MMOL/L (ref 136–145)
SODIUM SERPL-SCNC: 140 MMOL/L (ref 136–145)
WBC NRBC COR # BLD: 8.6 10*3/MM3 (ref 3.4–10.8)
WBC NRBC COR # BLD: 8.67 10*3/MM3 (ref 3.4–10.8)

## 2023-01-23 PROCEDURE — 36415 COLL VENOUS BLD VENIPUNCTURE: CPT

## 2023-01-23 PROCEDURE — 93005 ELECTROCARDIOGRAM TRACING: CPT

## 2023-01-23 PROCEDURE — 71046 X-RAY EXAM CHEST 2 VIEWS: CPT

## 2023-01-23 PROCEDURE — 80053 COMPREHEN METABOLIC PANEL: CPT

## 2023-01-23 PROCEDURE — 82378 CARCINOEMBRYONIC ANTIGEN: CPT

## 2023-01-23 PROCEDURE — 93010 ELECTROCARDIOGRAM REPORT: CPT | Performed by: STUDENT IN AN ORGANIZED HEALTH CARE EDUCATION/TRAINING PROGRAM

## 2023-01-23 PROCEDURE — 85025 COMPLETE CBC W/AUTO DIFF WBC: CPT

## 2023-01-23 RX ORDER — PHENOL 1.4 %
1200 AEROSOL, SPRAY (ML) MUCOUS MEMBRANE DAILY
COMMUNITY

## 2023-01-23 RX ORDER — DOXYCYCLINE HYCLATE 50 MG/1
324 CAPSULE, GELATIN COATED ORAL
Qty: 30 TABLET | Refills: 0 | Status: SHIPPED | OUTPATIENT
Start: 2023-01-23 | End: 2023-03-22

## 2023-01-23 NOTE — DISCHARGE INSTRUCTIONS
Take the following medications the morning of surgery:    LEVOTHYROXINE, CELEXA    ARRIVE TO MAIN OR DESK 2-9-23 AT 5:30AM    FOLLOW DR. LIGHT INSTRUCTION FOR BOWL PREP DAY BEFORE SURGERY    If you are on prescription narcotic pain medication to control your pain you may also take that medication the morning of surgery.    General Instructions:  Do not eat solid food after midnight the night before surgery.  You may drink clear liquids day of surgery but must stop at least one hour before your hospital arrival time.  It is beneficial for you to have a clear drink that contains carbohydrates the day of surgery.  We suggest a 12 to 20 ounce bottle of Gatorade or Powerade for non-diabetic patients or a 12 to 20 ounce bottle of G2 or Powerade Zero for diabetic patients. (Pediatric patients, are not advised to drink a 12 to 20 ounce carbohydrate drink)    Clear liquids are liquids you can see through.  Nothing red in color.     Plain water                               Sports drinks  Sodas                                   Gelatin (Jell-O)  Fruit juices without pulp such as white grape juice and apple juice  Popsicles that contain no fruit or yogurt  Tea or coffee (no cream or milk added)  Gatorade / Powerade  G2 / Powerade Zero    Infants may have breast milk up to four hours before surgery.  Infants drinking formula may drink formula up to six hours before surgery.   Patients who avoid smoking, chewing tobacco and alcohol for 4 weeks prior to surgery have a reduced risk of post-operative complications.  Quit smoking as many days before surgery as you can.  Do not smoke, use chewing tobacco or drink alcohol the day of surgery.   If applicable bring your C-PAP/ BI-PAP machine.  Bring any papers given to you in the doctor’s office.  Wear clean comfortable clothes.  Do not wear contact lenses, false eyelashes or make-up.  Bring a case for your glasses.   Bring crutches or walker if applicable.  Remove all piercings.   Leave jewelry and any other valuables at home.  Hair extensions with metal clips must be removed prior to surgery.  The Pre-Admission Testing nurse will instruct you to bring medications if unable to obtain an accurate list in Pre-Admission Testing.        Preventing a Surgical Site Infection:  For 2 to 3 days before surgery, avoid shaving with a razor because the razor can irritate skin and make it easier to develop an infection.    Any areas of open skin can increase the risk of a post-operative wound infection by allowing bacteria to enter and travel throughout the body.  Notify your surgeon if you have any skin wounds / rashes even if it is not near the expected surgical site.  The area will need assessed to determine if surgery should be delayed until it is healed.  The night prior to surgery shower using a fresh bar of anti-bacterial soap (such as Dial) and clean washcloth.  Sleep in a clean bed with clean clothing.  Do not allow pets to sleep with you.  Shower on the morning of surgery using a fresh bar of anti-bacterial soap (such as Dial) and clean washcloth.  Dry with a clean towel and dress in clean clothing.  Ask your surgeon if you will be receiving antibiotics prior to surgery.  Make sure you, your family, and all healthcare providers clean their hands with soap and water or an alcohol based hand  before caring for you or your wound.    Day of surgery:  Your arrival time is approximately two hours before your scheduled surgery time.  Upon arrival, a Pre-op nurse and Anesthesiologist will review your health history, obtain vital signs, and answer questions you may have.  The only belongings needed at this time will be a list of your home medications and if applicable your C-PAP/BI-PAP machine.  A Pre-op nurse will start an IV and you may receive medication in preparation for surgery, including something to help you relax.     Please be aware that surgery does come with discomfort.  We want to  make every effort to control your discomfort so please discuss any uncontrolled symptoms with your nurse.   Your doctor will most likely have prescribed pain medications.      If you are going home after surgery you will receive individualized written care instructions before being discharged.  A responsible adult must drive you to and from the hospital on the day of your surgery and stay with you for 24 hours.  Discharge prescriptions can be filled by the hospital pharmacy during regular pharmacy hours.  If you are having surgery late in the day/evening your prescription may be e-prescribed to your pharmacy.  Please verify your pharmacy hours or chose a 24 hour pharmacy to avoid not having access to your prescription because your pharmacy has closed for the day.    If you are staying overnight following surgery, you will be transported to your hospital room following the recovery period.  [facility] has all private rooms.    If you have any questions please call Pre-Admission Testing at [phone].  Deductibles and co-payments are collected on the day of service. Please be prepared to pay the required co-pay, deductible or deposit on the day of service as defined by your plan.    Call your surgeon immediately if you experience any of the following symptoms:  Sore Throat  Shortness of Breath or difficulty breathing  Cough  Chills  Body soreness or muscle pain  Headache  Fever  New loss of taste or smell  Do not arrive for your surgery ill.  Your procedure will need to be rescheduled to another time.  You will need to call your physician before the day of surgery to avoid any unnecessary exposure to hospital staff as well as other patients.         CHLORHEXIDINE CLOTH INSTRUCTIONS  The morning of surgery follow these instructions using the Chlorhexidine cloths you've been given.  These steps reduce bacteria on the body.  Do not use the cloths near your eyes, ears mouth, genitalia or on open wounds.   Throw the cloths away after use but do not try to flush them down a toilet.      Open and remove one cloth at a time from the package.    Leave the cloth unfolded and begin the bathing.  Massage the skin with the cloths using gentle pressure to remove bacteria.  Do not scrub harshly.   Follow the steps below with one 2% CHG cloth per area (6 total cloths).  One cloth for neck, shoulders and chest.  One cloth for both arms, hands, fingers and underarms (do underarms last).  One cloth for the abdomen followed by groin.  One cloth for right leg and foot including between the toes.  One cloth for left leg and foot including between the toes.  The last cloth is to be used for the back of the neck, back and buttocks.    Allow the CHG to air dry 3 minutes on the skin which will give it time to work and decrease the chance of irritation.  The skin may feel sticky until it is dry.  Do not rinse with water or any other liquid or you will lose the beneficial effects of the CHG.  If mild skin irritation occurs, do rinse the skin to remove the CHG.  Report this to the nurse at time of admission.  Do not apply lotions, creams, ointments, deodorants or perfumes after using the clothes. Dress in clean clothes before coming to the hospital.

## 2023-01-27 ENCOUNTER — OFFICE VISIT (OUTPATIENT)
Dept: FAMILY MEDICINE CLINIC | Facility: CLINIC | Age: 73
End: 2023-01-27
Payer: MEDICARE

## 2023-01-27 VITALS
DIASTOLIC BLOOD PRESSURE: 72 MMHG | HEART RATE: 64 BPM | HEIGHT: 66 IN | OXYGEN SATURATION: 98 % | WEIGHT: 207.8 LBS | TEMPERATURE: 98.2 F | SYSTOLIC BLOOD PRESSURE: 124 MMHG | BODY MASS INDEX: 33.4 KG/M2

## 2023-01-27 DIAGNOSIS — K76.0 HEPATIC STEATOSIS: Primary | ICD-10-CM

## 2023-01-27 DIAGNOSIS — E78.00 PURE HYPERCHOLESTEROLEMIA: ICD-10-CM

## 2023-01-27 DIAGNOSIS — D50.0 IRON DEFICIENCY ANEMIA DUE TO CHRONIC BLOOD LOSS: ICD-10-CM

## 2023-01-27 DIAGNOSIS — E89.0 POSTOPERATIVE HYPOTHYROIDISM: ICD-10-CM

## 2023-01-27 PROCEDURE — 99213 OFFICE O/P EST LOW 20 MIN: CPT | Performed by: FAMILY MEDICINE

## 2023-02-09 ENCOUNTER — ANESTHESIA (OUTPATIENT)
Dept: PERIOP | Facility: HOSPITAL | Age: 73
DRG: 331 | End: 2023-02-09
Payer: MEDICARE

## 2023-02-09 ENCOUNTER — HOSPITAL ENCOUNTER (INPATIENT)
Facility: HOSPITAL | Age: 73
LOS: 2 days | Discharge: HOME | DRG: 331 | End: 2023-02-11
Attending: SURGERY | Admitting: SURGERY
Payer: MEDICARE

## 2023-02-09 ENCOUNTER — ANESTHESIA EVENT (OUTPATIENT)
Dept: PERIOP | Facility: HOSPITAL | Age: 73
DRG: 331 | End: 2023-02-09
Payer: MEDICARE

## 2023-02-09 DIAGNOSIS — K63.89 COLONIC MASS: ICD-10-CM

## 2023-02-09 DIAGNOSIS — N83.209 OVARIAN CYSTIC MASS: ICD-10-CM

## 2023-02-09 LAB
ABO GROUP BLD: NORMAL
ANION GAP SERPL CALCULATED.3IONS-SCNC: 11 MMOL/L (ref 5–15)
BASOPHILS # BLD AUTO: 0.03 10*3/MM3 (ref 0–0.2)
BASOPHILS NFR BLD AUTO: 0.2 % (ref 0–1.5)
BLD GP AB SCN SERPL QL: NEGATIVE
BUN SERPL-MCNC: 14 MG/DL (ref 8–23)
BUN/CREAT SERPL: 11.7 (ref 7–25)
CALCIUM SPEC-SCNC: 7.3 MG/DL (ref 8.6–10.5)
CHLORIDE SERPL-SCNC: 104 MMOL/L (ref 98–107)
CO2 SERPL-SCNC: 21 MMOL/L (ref 22–29)
CREAT SERPL-MCNC: 1.2 MG/DL (ref 0.57–1)
DEPRECATED RDW RBC AUTO: 44.4 FL (ref 37–54)
EGFRCR SERPLBLD CKD-EPI 2021: 47.6 ML/MIN/1.73
EOSINOPHIL # BLD AUTO: 0 10*3/MM3 (ref 0–0.4)
EOSINOPHIL NFR BLD AUTO: 0 % (ref 0.3–6.2)
ERYTHROCYTE [DISTWIDTH] IN BLOOD BY AUTOMATED COUNT: 16 % (ref 12.3–15.4)
GLUCOSE SERPL-MCNC: 188 MG/DL (ref 65–99)
HCT VFR BLD AUTO: 34.1 % (ref 34–46.6)
HGB BLD-MCNC: 10.4 G/DL (ref 12–15.9)
IMM GRANULOCYTES # BLD AUTO: 0.07 10*3/MM3 (ref 0–0.05)
IMM GRANULOCYTES NFR BLD AUTO: 0.5 % (ref 0–0.5)
LYMPHOCYTES # BLD AUTO: 0.55 10*3/MM3 (ref 0.7–3.1)
LYMPHOCYTES NFR BLD AUTO: 3.8 % (ref 19.6–45.3)
MCH RBC QN AUTO: 23.4 PG (ref 26.6–33)
MCHC RBC AUTO-ENTMCNC: 30.5 G/DL (ref 31.5–35.7)
MCV RBC AUTO: 76.8 FL (ref 79–97)
MONOCYTES # BLD AUTO: 0.46 10*3/MM3 (ref 0.1–0.9)
MONOCYTES NFR BLD AUTO: 3.1 % (ref 5–12)
NEUTROPHILS NFR BLD AUTO: 13.52 10*3/MM3 (ref 1.7–7)
NEUTROPHILS NFR BLD AUTO: 92.4 % (ref 42.7–76)
NRBC BLD AUTO-RTO: 0 /100 WBC (ref 0–0.2)
PLATELET # BLD AUTO: 307 10*3/MM3 (ref 140–450)
PMV BLD AUTO: 9.9 FL (ref 6–12)
POTASSIUM SERPL-SCNC: 4.3 MMOL/L (ref 3.5–5.2)
RBC # BLD AUTO: 4.44 10*6/MM3 (ref 3.77–5.28)
RH BLD: POSITIVE
SODIUM SERPL-SCNC: 136 MMOL/L (ref 136–145)
T&S EXPIRATION DATE: NORMAL
WBC NRBC COR # BLD: 14.63 10*3/MM3 (ref 3.4–10.8)

## 2023-02-09 PROCEDURE — 88341 IMHCHEM/IMCYTCHM EA ADD ANTB: CPT

## 2023-02-09 PROCEDURE — 25010000002 LIDOCAINE PER 10 MG: Performed by: NURSE ANESTHETIST, CERTIFIED REGISTERED

## 2023-02-09 PROCEDURE — 0UT24ZZ RESECTION OF BILATERAL OVARIES, PERCUTANEOUS ENDOSCOPIC APPROACH: ICD-10-PCS | Performed by: SURGERY

## 2023-02-09 PROCEDURE — 25010000002 ONDANSETRON PER 1 MG: Performed by: NURSE ANESTHETIST, CERTIFIED REGISTERED

## 2023-02-09 PROCEDURE — 25010000002 CEFAZOLIN IN DEXTROSE 2-4 GM/100ML-% SOLUTION: Performed by: SURGERY

## 2023-02-09 PROCEDURE — 80048 BASIC METABOLIC PNL TOTAL CA: CPT | Performed by: SURGERY

## 2023-02-09 PROCEDURE — 88307 TISSUE EXAM BY PATHOLOGIST: CPT | Performed by: SURGERY

## 2023-02-09 PROCEDURE — 25010000002 KETOROLAC TROMETHAMINE PER 15 MG: Performed by: SURGERY

## 2023-02-09 PROCEDURE — 25010000002 HYDROMORPHONE PER 4 MG: Performed by: NURSE ANESTHETIST, CERTIFIED REGISTERED

## 2023-02-09 PROCEDURE — 88309 TISSUE EXAM BY PATHOLOGIST: CPT | Performed by: SURGERY

## 2023-02-09 PROCEDURE — 25010000002 FENTANYL CITRATE (PF) 50 MCG/ML SOLUTION: Performed by: NURSE ANESTHETIST, CERTIFIED REGISTERED

## 2023-02-09 PROCEDURE — 25010000002 HYDROMORPHONE 1 MG/ML SOLUTION: Performed by: NURSE ANESTHETIST, CERTIFIED REGISTERED

## 2023-02-09 PROCEDURE — 44204 LAPARO PARTIAL COLECTOMY: CPT | Performed by: SURGERY

## 2023-02-09 PROCEDURE — 86901 BLOOD TYPING SEROLOGIC RH(D): CPT | Performed by: SURGERY

## 2023-02-09 PROCEDURE — 25010000002 DEXAMETHASONE SODIUM PHOSPHATE 20 MG/5ML SOLUTION: Performed by: NURSE ANESTHETIST, CERTIFIED REGISTERED

## 2023-02-09 PROCEDURE — 86900 BLOOD TYPING SEROLOGIC ABO: CPT | Performed by: SURGERY

## 2023-02-09 PROCEDURE — 25010000002 MAGNESIUM SULFATE PER 500 MG OF MAGNESIUM: Performed by: NURSE ANESTHETIST, CERTIFIED REGISTERED

## 2023-02-09 PROCEDURE — 86850 RBC ANTIBODY SCREEN: CPT | Performed by: SURGERY

## 2023-02-09 PROCEDURE — 0DTF4ZZ RESECTION OF RIGHT LARGE INTESTINE, PERCUTANEOUS ENDOSCOPIC APPROACH: ICD-10-PCS | Performed by: SURGERY

## 2023-02-09 PROCEDURE — 25010000002 NEOSTIGMINE 5 MG/10ML SOLUTION: Performed by: NURSE ANESTHETIST, CERTIFIED REGISTERED

## 2023-02-09 PROCEDURE — 0 BUPIVACAINE LIPOSOME 1.3 % SUSPENSION: Performed by: SURGERY

## 2023-02-09 PROCEDURE — 25010000002 MIDAZOLAM PER 1 MG: Performed by: ANESTHESIOLOGY

## 2023-02-09 PROCEDURE — 0UT74ZZ RESECTION OF BILATERAL FALLOPIAN TUBES, PERCUTANEOUS ENDOSCOPIC APPROACH: ICD-10-PCS | Performed by: SURGERY

## 2023-02-09 PROCEDURE — 81301 MICROSATELLITE INSTABILITY: CPT

## 2023-02-09 PROCEDURE — 58720 REMOVAL OF OVARY/TUBE(S): CPT | Performed by: SURGERY

## 2023-02-09 PROCEDURE — 85025 COMPLETE CBC W/AUTO DIFF WBC: CPT | Performed by: SURGERY

## 2023-02-09 PROCEDURE — C9290 INJ, BUPIVACAINE LIPOSOME: HCPCS | Performed by: SURGERY

## 2023-02-09 PROCEDURE — 25010000002 PROPOFOL 10 MG/ML EMULSION: Performed by: NURSE ANESTHETIST, CERTIFIED REGISTERED

## 2023-02-09 PROCEDURE — 88342 IMHCHEM/IMCYTCHM 1ST ANTB: CPT

## 2023-02-09 DEVICE — ENDOSCOPIC LINEAR CUTTER RELOADS GRAY 2.0MM, 6 ROWS
Type: IMPLANTABLE DEVICE | Site: ABDOMEN | Status: FUNCTIONAL
Brand: ECHELON ENDOPATH

## 2023-02-09 DEVICE — ENDOPATH ECHELON ENDOSCOPIC LINEAR CUTTER RELOADS, WHITE, 60MM
Type: IMPLANTABLE DEVICE | Site: ABDOMEN | Status: FUNCTIONAL
Brand: ECHELON ENDOPATH

## 2023-02-09 DEVICE — HORIZON TI ML 6 CLIPS/CART
Type: IMPLANTABLE DEVICE | Site: ABDOMEN | Status: FUNCTIONAL
Brand: WECK

## 2023-02-09 DEVICE — ENDOPATH ECHELON ENDOSCOPIC LINEAR CUTTER RELOADS, BLUE, 60MM
Type: IMPLANTABLE DEVICE | Site: ABDOMEN | Status: FUNCTIONAL
Brand: ECHELON ENDOPATH

## 2023-02-09 RX ORDER — DEXAMETHASONE SODIUM PHOSPHATE 4 MG/ML
INJECTION, SOLUTION INTRA-ARTICULAR; INTRALESIONAL; INTRAMUSCULAR; INTRAVENOUS; SOFT TISSUE AS NEEDED
Status: DISCONTINUED | OUTPATIENT
Start: 2023-02-09 | End: 2023-02-09 | Stop reason: SURG

## 2023-02-09 RX ORDER — SODIUM CHLORIDE 0.9 % (FLUSH) 0.9 %
3 SYRINGE (ML) INJECTION EVERY 12 HOURS SCHEDULED
Status: DISCONTINUED | OUTPATIENT
Start: 2023-02-09 | End: 2023-02-09 | Stop reason: HOSPADM

## 2023-02-09 RX ORDER — MAGNESIUM HYDROXIDE 1200 MG/15ML
LIQUID ORAL AS NEEDED
Status: DISCONTINUED | OUTPATIENT
Start: 2023-02-09 | End: 2023-02-09 | Stop reason: HOSPADM

## 2023-02-09 RX ORDER — SODIUM CHLORIDE 0.9 % (FLUSH) 0.9 %
3-10 SYRINGE (ML) INJECTION AS NEEDED
Status: DISCONTINUED | OUTPATIENT
Start: 2023-02-09 | End: 2023-02-09 | Stop reason: HOSPADM

## 2023-02-09 RX ORDER — ONDANSETRON 2 MG/ML
INJECTION INTRAMUSCULAR; INTRAVENOUS AS NEEDED
Status: DISCONTINUED | OUTPATIENT
Start: 2023-02-09 | End: 2023-02-09 | Stop reason: SURG

## 2023-02-09 RX ORDER — HYDROMORPHONE HYDROCHLORIDE 1 MG/ML
0.25 INJECTION, SOLUTION INTRAMUSCULAR; INTRAVENOUS; SUBCUTANEOUS
Status: DISCONTINUED | OUTPATIENT
Start: 2023-02-09 | End: 2023-02-11 | Stop reason: HOSPADM

## 2023-02-09 RX ORDER — CITALOPRAM 20 MG/1
20 TABLET ORAL DAILY
Status: DISCONTINUED | OUTPATIENT
Start: 2023-02-09 | End: 2023-02-11 | Stop reason: HOSPADM

## 2023-02-09 RX ORDER — HYDRALAZINE HYDROCHLORIDE 20 MG/ML
5 INJECTION INTRAMUSCULAR; INTRAVENOUS
Status: DISCONTINUED | OUTPATIENT
Start: 2023-02-09 | End: 2023-02-09 | Stop reason: HOSPADM

## 2023-02-09 RX ORDER — SODIUM CHLORIDE, SODIUM LACTATE, POTASSIUM CHLORIDE, CALCIUM CHLORIDE 600; 310; 30; 20 MG/100ML; MG/100ML; MG/100ML; MG/100ML
9 INJECTION, SOLUTION INTRAVENOUS CONTINUOUS
Status: DISCONTINUED | OUTPATIENT
Start: 2023-02-09 | End: 2023-02-09

## 2023-02-09 RX ORDER — ALVIMOPAN 12 MG/1
12 CAPSULE ORAL 2 TIMES DAILY
Status: DISCONTINUED | OUTPATIENT
Start: 2023-02-09 | End: 2023-02-11

## 2023-02-09 RX ORDER — NALOXONE HCL 0.4 MG/ML
0.2 VIAL (ML) INJECTION AS NEEDED
Status: DISCONTINUED | OUTPATIENT
Start: 2023-02-09 | End: 2023-02-09 | Stop reason: HOSPADM

## 2023-02-09 RX ORDER — LIDOCAINE HYDROCHLORIDE 20 MG/ML
INJECTION, SOLUTION INFILTRATION; PERINEURAL AS NEEDED
Status: DISCONTINUED | OUTPATIENT
Start: 2023-02-09 | End: 2023-02-09 | Stop reason: SURG

## 2023-02-09 RX ORDER — FLUMAZENIL 0.1 MG/ML
0.2 INJECTION INTRAVENOUS AS NEEDED
Status: DISCONTINUED | OUTPATIENT
Start: 2023-02-09 | End: 2023-02-09 | Stop reason: HOSPADM

## 2023-02-09 RX ORDER — LIDOCAINE HYDROCHLORIDE 10 MG/ML
0.5 INJECTION, SOLUTION EPIDURAL; INFILTRATION; INTRACAUDAL; PERINEURAL ONCE AS NEEDED
Status: DISCONTINUED | OUTPATIENT
Start: 2023-02-09 | End: 2023-02-09 | Stop reason: HOSPADM

## 2023-02-09 RX ORDER — GABAPENTIN 300 MG/1
300 CAPSULE ORAL 2 TIMES DAILY
Status: DISCONTINUED | OUTPATIENT
Start: 2023-02-09 | End: 2023-02-11 | Stop reason: HOSPADM

## 2023-02-09 RX ORDER — BUPIVACAINE HYDROCHLORIDE AND EPINEPHRINE 5; 5 MG/ML; UG/ML
INJECTION, SOLUTION PERINEURAL AS NEEDED
Status: DISCONTINUED | OUTPATIENT
Start: 2023-02-09 | End: 2023-02-09 | Stop reason: HOSPADM

## 2023-02-09 RX ORDER — LABETALOL HYDROCHLORIDE 5 MG/ML
5 INJECTION, SOLUTION INTRAVENOUS
Status: DISCONTINUED | OUTPATIENT
Start: 2023-02-09 | End: 2023-02-09 | Stop reason: HOSPADM

## 2023-02-09 RX ORDER — SODIUM CHLORIDE, SODIUM LACTATE, POTASSIUM CHLORIDE, CALCIUM CHLORIDE 600; 310; 30; 20 MG/100ML; MG/100ML; MG/100ML; MG/100ML
50 INJECTION, SOLUTION INTRAVENOUS CONTINUOUS
Status: DISCONTINUED | OUTPATIENT
Start: 2023-02-09 | End: 2023-02-11 | Stop reason: HOSPADM

## 2023-02-09 RX ORDER — SCOLOPAMINE TRANSDERMAL SYSTEM 1 MG/1
1 PATCH, EXTENDED RELEASE TRANSDERMAL CONTINUOUS
Status: DISCONTINUED | OUTPATIENT
Start: 2023-02-09 | End: 2023-02-09

## 2023-02-09 RX ORDER — ONDANSETRON 2 MG/ML
4 INJECTION INTRAMUSCULAR; INTRAVENOUS EVERY 6 HOURS PRN
Status: DISCONTINUED | OUTPATIENT
Start: 2023-02-09 | End: 2023-02-11 | Stop reason: HOSPADM

## 2023-02-09 RX ORDER — PROMETHAZINE HYDROCHLORIDE 25 MG/1
25 SUPPOSITORY RECTAL ONCE AS NEEDED
Status: DISCONTINUED | OUTPATIENT
Start: 2023-02-09 | End: 2023-02-09 | Stop reason: HOSPADM

## 2023-02-09 RX ORDER — CEFAZOLIN SODIUM 2 G/100ML
2 INJECTION, SOLUTION INTRAVENOUS ONCE
Status: COMPLETED | OUTPATIENT
Start: 2023-02-09 | End: 2023-02-09

## 2023-02-09 RX ORDER — SODIUM CHLORIDE 9 MG/ML
INJECTION, SOLUTION INTRAVENOUS AS NEEDED
Status: DISCONTINUED | OUTPATIENT
Start: 2023-02-09 | End: 2023-02-09 | Stop reason: HOSPADM

## 2023-02-09 RX ORDER — PROMETHAZINE HYDROCHLORIDE 25 MG/1
25 TABLET ORAL ONCE AS NEEDED
Status: DISCONTINUED | OUTPATIENT
Start: 2023-02-09 | End: 2023-02-09 | Stop reason: HOSPADM

## 2023-02-09 RX ORDER — ONDANSETRON 2 MG/ML
4 INJECTION INTRAMUSCULAR; INTRAVENOUS ONCE AS NEEDED
Status: DISCONTINUED | OUTPATIENT
Start: 2023-02-09 | End: 2023-02-09 | Stop reason: HOSPADM

## 2023-02-09 RX ORDER — LIDOCAINE HYDROCHLORIDE ANHYDROUS AND DEXTROSE MONOHYDRATE 5; 400 G/100ML; MG/100ML
INJECTION, SOLUTION INTRAVENOUS CONTINUOUS PRN
Status: DISCONTINUED | OUTPATIENT
Start: 2023-02-09 | End: 2023-02-09 | Stop reason: SURG

## 2023-02-09 RX ORDER — CELECOXIB 200 MG/1
200 CAPSULE ORAL EVERY 12 HOURS SCHEDULED
Status: DISCONTINUED | OUTPATIENT
Start: 2023-02-09 | End: 2023-02-09 | Stop reason: HOSPADM

## 2023-02-09 RX ORDER — METRONIDAZOLE 500 MG/100ML
500 INJECTION, SOLUTION INTRAVENOUS EVERY 8 HOURS
Status: COMPLETED | OUTPATIENT
Start: 2023-02-09 | End: 2023-02-10

## 2023-02-09 RX ORDER — ESMOLOL HYDROCHLORIDE 10 MG/ML
INJECTION INTRAVENOUS AS NEEDED
Status: DISCONTINUED | OUTPATIENT
Start: 2023-02-09 | End: 2023-02-09 | Stop reason: SURG

## 2023-02-09 RX ORDER — ONDANSETRON 4 MG/1
4 TABLET, FILM COATED ORAL EVERY 6 HOURS PRN
Status: DISCONTINUED | OUTPATIENT
Start: 2023-02-09 | End: 2023-02-11 | Stop reason: HOSPADM

## 2023-02-09 RX ORDER — GABAPENTIN 300 MG/1
300 CAPSULE ORAL 3 TIMES DAILY
Status: DISCONTINUED | OUTPATIENT
Start: 2023-02-09 | End: 2023-02-09 | Stop reason: HOSPADM

## 2023-02-09 RX ORDER — FAMOTIDINE 10 MG/ML
20 INJECTION, SOLUTION INTRAVENOUS ONCE
Status: COMPLETED | OUTPATIENT
Start: 2023-02-09 | End: 2023-02-09

## 2023-02-09 RX ORDER — DIPHENHYDRAMINE HCL 25 MG
25 CAPSULE ORAL
Status: DISCONTINUED | OUTPATIENT
Start: 2023-02-09 | End: 2023-02-09 | Stop reason: HOSPADM

## 2023-02-09 RX ORDER — CALCITRIOL 0.25 UG/1
0.25 CAPSULE, LIQUID FILLED ORAL DAILY
Status: DISCONTINUED | OUTPATIENT
Start: 2023-02-09 | End: 2023-02-11 | Stop reason: HOSPADM

## 2023-02-09 RX ORDER — LEVOTHYROXINE SODIUM 0.12 MG/1
125 TABLET ORAL
Status: DISCONTINUED | OUTPATIENT
Start: 2023-02-10 | End: 2023-02-11 | Stop reason: HOSPADM

## 2023-02-09 RX ORDER — NEOSTIGMINE METHYLSULFATE 0.5 MG/ML
INJECTION, SOLUTION INTRAVENOUS AS NEEDED
Status: DISCONTINUED | OUTPATIENT
Start: 2023-02-09 | End: 2023-02-09 | Stop reason: SURG

## 2023-02-09 RX ORDER — IBUPROFEN 400 MG/1
400 TABLET ORAL EVERY 6 HOURS PRN
Status: DISCONTINUED | OUTPATIENT
Start: 2023-02-09 | End: 2023-02-10

## 2023-02-09 RX ORDER — ACETAMINOPHEN 650 MG
TABLET, EXTENDED RELEASE ORAL AS NEEDED
Status: DISCONTINUED | OUTPATIENT
Start: 2023-02-09 | End: 2023-02-09 | Stop reason: HOSPADM

## 2023-02-09 RX ORDER — OXYCODONE AND ACETAMINOPHEN 7.5; 325 MG/1; MG/1
1 TABLET ORAL EVERY 4 HOURS PRN
Status: DISCONTINUED | OUTPATIENT
Start: 2023-02-09 | End: 2023-02-09 | Stop reason: HOSPADM

## 2023-02-09 RX ORDER — HYDROCODONE BITARTRATE AND ACETAMINOPHEN 7.5; 325 MG/1; MG/1
1 TABLET ORAL ONCE AS NEEDED
Status: DISCONTINUED | OUTPATIENT
Start: 2023-02-09 | End: 2023-02-09 | Stop reason: HOSPADM

## 2023-02-09 RX ORDER — ACETAMINOPHEN 500 MG
1000 TABLET ORAL EVERY 6 HOURS
Status: COMPLETED | OUTPATIENT
Start: 2023-02-09 | End: 2023-02-11

## 2023-02-09 RX ORDER — OXYCODONE HYDROCHLORIDE 5 MG/1
5 TABLET ORAL EVERY 4 HOURS PRN
Status: DISCONTINUED | OUTPATIENT
Start: 2023-02-09 | End: 2023-02-11 | Stop reason: HOSPADM

## 2023-02-09 RX ORDER — MAGNESIUM SULFATE HEPTAHYDRATE 500 MG/ML
INJECTION, SOLUTION INTRAMUSCULAR; INTRAVENOUS AS NEEDED
Status: DISCONTINUED | OUTPATIENT
Start: 2023-02-09 | End: 2023-02-09 | Stop reason: SURG

## 2023-02-09 RX ORDER — ACETAMINOPHEN 500 MG
1000 TABLET ORAL EVERY 6 HOURS
Status: DISCONTINUED | OUTPATIENT
Start: 2023-02-09 | End: 2023-02-09 | Stop reason: HOSPADM

## 2023-02-09 RX ORDER — KETAMINE HCL IN NACL, ISO-OSM 100MG/10ML
SYRINGE (ML) INJECTION AS NEEDED
Status: DISCONTINUED | OUTPATIENT
Start: 2023-02-09 | End: 2023-02-09 | Stop reason: SURG

## 2023-02-09 RX ORDER — KETOROLAC TROMETHAMINE 15 MG/ML
15 INJECTION, SOLUTION INTRAMUSCULAR; INTRAVENOUS EVERY 6 HOURS
Status: DISCONTINUED | OUTPATIENT
Start: 2023-02-09 | End: 2023-02-10

## 2023-02-09 RX ORDER — CEFAZOLIN SODIUM 2 G/100ML
2 INJECTION, SOLUTION INTRAVENOUS EVERY 8 HOURS
Status: DISCONTINUED | OUTPATIENT
Start: 2023-02-09 | End: 2023-02-09

## 2023-02-09 RX ORDER — CEFAZOLIN SODIUM 2 G/100ML
2 INJECTION, SOLUTION INTRAVENOUS EVERY 8 HOURS
Status: COMPLETED | OUTPATIENT
Start: 2023-02-09 | End: 2023-02-10

## 2023-02-09 RX ORDER — ROCURONIUM BROMIDE 10 MG/ML
INJECTION, SOLUTION INTRAVENOUS AS NEEDED
Status: DISCONTINUED | OUTPATIENT
Start: 2023-02-09 | End: 2023-02-09 | Stop reason: SURG

## 2023-02-09 RX ORDER — LABETALOL HYDROCHLORIDE 5 MG/ML
INJECTION, SOLUTION INTRAVENOUS AS NEEDED
Status: DISCONTINUED | OUTPATIENT
Start: 2023-02-09 | End: 2023-02-09 | Stop reason: SURG

## 2023-02-09 RX ORDER — METRONIDAZOLE 500 MG/100ML
500 INJECTION, SOLUTION INTRAVENOUS ONCE
Status: COMPLETED | OUTPATIENT
Start: 2023-02-09 | End: 2023-02-09

## 2023-02-09 RX ORDER — FENTANYL CITRATE 50 UG/ML
50 INJECTION, SOLUTION INTRAMUSCULAR; INTRAVENOUS
Status: DISCONTINUED | OUTPATIENT
Start: 2023-02-09 | End: 2023-02-09 | Stop reason: HOSPADM

## 2023-02-09 RX ORDER — PROMETHAZINE HYDROCHLORIDE 12.5 MG/1
12.5 TABLET ORAL EVERY 6 HOURS PRN
Status: DISCONTINUED | OUTPATIENT
Start: 2023-02-09 | End: 2023-02-11 | Stop reason: HOSPADM

## 2023-02-09 RX ORDER — GLYCOPYRROLATE 0.2 MG/ML
INJECTION INTRAMUSCULAR; INTRAVENOUS AS NEEDED
Status: DISCONTINUED | OUTPATIENT
Start: 2023-02-09 | End: 2023-02-09 | Stop reason: SURG

## 2023-02-09 RX ORDER — ALVIMOPAN 12 MG/1
12 CAPSULE ORAL 2 TIMES DAILY
Status: DISCONTINUED | OUTPATIENT
Start: 2023-02-09 | End: 2023-02-09 | Stop reason: HOSPADM

## 2023-02-09 RX ORDER — LATANOPROST 50 UG/ML
1 SOLUTION/ DROPS OPHTHALMIC NIGHTLY
Status: DISCONTINUED | OUTPATIENT
Start: 2023-02-09 | End: 2023-02-11 | Stop reason: HOSPADM

## 2023-02-09 RX ORDER — EPHEDRINE SULFATE 50 MG/ML
5 INJECTION, SOLUTION INTRAVENOUS ONCE AS NEEDED
Status: DISCONTINUED | OUTPATIENT
Start: 2023-02-09 | End: 2023-02-09 | Stop reason: HOSPADM

## 2023-02-09 RX ORDER — MIDAZOLAM HYDROCHLORIDE 1 MG/ML
0.5 INJECTION INTRAMUSCULAR; INTRAVENOUS
Status: DISCONTINUED | OUTPATIENT
Start: 2023-02-09 | End: 2023-02-09 | Stop reason: HOSPADM

## 2023-02-09 RX ORDER — FENTANYL CITRATE 50 UG/ML
INJECTION, SOLUTION INTRAMUSCULAR; INTRAVENOUS AS NEEDED
Status: DISCONTINUED | OUTPATIENT
Start: 2023-02-09 | End: 2023-02-09 | Stop reason: SURG

## 2023-02-09 RX ORDER — PROPOFOL 10 MG/ML
VIAL (ML) INTRAVENOUS AS NEEDED
Status: DISCONTINUED | OUTPATIENT
Start: 2023-02-09 | End: 2023-02-09 | Stop reason: SURG

## 2023-02-09 RX ORDER — NALOXONE HCL 0.4 MG/ML
0.4 VIAL (ML) INJECTION
Status: DISCONTINUED | OUTPATIENT
Start: 2023-02-09 | End: 2023-02-11 | Stop reason: HOSPADM

## 2023-02-09 RX ORDER — SODIUM CHLORIDE, SODIUM LACTATE, POTASSIUM CHLORIDE, AND CALCIUM CHLORIDE .6; .31; .03; .02 G/100ML; G/100ML; G/100ML; G/100ML
9 INJECTION, SOLUTION INTRAVENOUS CONTINUOUS
Status: DISCONTINUED | OUTPATIENT
Start: 2023-02-09 | End: 2023-02-09

## 2023-02-09 RX ORDER — PRAVASTATIN SODIUM 40 MG
40 TABLET ORAL NIGHTLY
Status: DISCONTINUED | OUTPATIENT
Start: 2023-02-09 | End: 2023-02-11 | Stop reason: HOSPADM

## 2023-02-09 RX ORDER — DIPHENHYDRAMINE HYDROCHLORIDE 50 MG/ML
12.5 INJECTION INTRAMUSCULAR; INTRAVENOUS
Status: DISCONTINUED | OUTPATIENT
Start: 2023-02-09 | End: 2023-02-09 | Stop reason: HOSPADM

## 2023-02-09 RX ORDER — HYDROMORPHONE HYDROCHLORIDE 1 MG/ML
0.5 INJECTION, SOLUTION INTRAMUSCULAR; INTRAVENOUS; SUBCUTANEOUS
Status: DISCONTINUED | OUTPATIENT
Start: 2023-02-09 | End: 2023-02-09 | Stop reason: HOSPADM

## 2023-02-09 RX ADMIN — ALVIMOPAN 12 MG: 12 CAPSULE ORAL at 20:02

## 2023-02-09 RX ADMIN — HYDROMORPHONE HYDROCHLORIDE 0.5 MG: 1 INJECTION, SOLUTION INTRAMUSCULAR; INTRAVENOUS; SUBCUTANEOUS at 08:36

## 2023-02-09 RX ADMIN — SODIUM CHLORIDE, POTASSIUM CHLORIDE, SODIUM LACTATE AND CALCIUM CHLORIDE 50 ML/HR: 600; 310; 30; 20 INJECTION, SOLUTION INTRAVENOUS at 15:13

## 2023-02-09 RX ADMIN — MAGNESIUM SULFATE HEPTAHYDRATE 2 G: 500 INJECTION, SOLUTION INTRAMUSCULAR; INTRAVENOUS at 07:54

## 2023-02-09 RX ADMIN — LATANOPROST 1 DROP: 50 SOLUTION OPHTHALMIC at 20:03

## 2023-02-09 RX ADMIN — CEFAZOLIN SODIUM 2 G: 2 INJECTION, SOLUTION INTRAVENOUS at 11:29

## 2023-02-09 RX ADMIN — ROCURONIUM BROMIDE 20 MG: 10 INJECTION, SOLUTION INTRAVENOUS at 10:27

## 2023-02-09 RX ADMIN — KETOROLAC TROMETHAMINE 15 MG: 15 INJECTION, SOLUTION INTRAMUSCULAR; INTRAVENOUS at 13:46

## 2023-02-09 RX ADMIN — NEOSTIGMINE METHYLSULFATE 3 MG: 0.5 INJECTION INTRAVENOUS at 11:40

## 2023-02-09 RX ADMIN — SODIUM CHLORIDE, POTASSIUM CHLORIDE, SODIUM LACTATE AND CALCIUM CHLORIDE 500 ML: 600; 310; 30; 20 INJECTION, SOLUTION INTRAVENOUS at 12:03

## 2023-02-09 RX ADMIN — ROCURONIUM BROMIDE 10 MG: 10 INJECTION, SOLUTION INTRAVENOUS at 10:00

## 2023-02-09 RX ADMIN — PROPOFOL 150 MG: 10 INJECTION, EMULSION INTRAVENOUS at 07:41

## 2023-02-09 RX ADMIN — ONDANSETRON 4 MG: 2 INJECTION INTRAMUSCULAR; INTRAVENOUS at 11:40

## 2023-02-09 RX ADMIN — METRONIDAZOLE 500 MG: 500 INJECTION, SOLUTION INTRAVENOUS at 17:13

## 2023-02-09 RX ADMIN — ACETAMINOPHEN 1000 MG: 500 TABLET, FILM COATED ORAL at 21:45

## 2023-02-09 RX ADMIN — GLYCOPYRROLATE 0.6 MCG: 1 INJECTION INTRAMUSCULAR; INTRAVENOUS at 11:40

## 2023-02-09 RX ADMIN — PRAVASTATIN SODIUM 40 MG: 40 TABLET ORAL at 20:02

## 2023-02-09 RX ADMIN — CEFAZOLIN SODIUM 2 G: 2 INJECTION, SOLUTION INTRAVENOUS at 07:33

## 2023-02-09 RX ADMIN — FENTANYL CITRATE 50 MCG: 0.05 INJECTION, SOLUTION INTRAMUSCULAR; INTRAVENOUS at 13:00

## 2023-02-09 RX ADMIN — ACETAMINOPHEN 1000 MG: 500 TABLET, FILM COATED ORAL at 16:01

## 2023-02-09 RX ADMIN — SODIUM CHLORIDE, POTASSIUM CHLORIDE, SODIUM LACTATE AND CALCIUM CHLORIDE 1000 ML: 600; 310; 30; 20 INJECTION, SOLUTION INTRAVENOUS at 09:23

## 2023-02-09 RX ADMIN — ROCURONIUM BROMIDE 50 MG: 10 INJECTION, SOLUTION INTRAVENOUS at 07:41

## 2023-02-09 RX ADMIN — LIDOCAINE HYDROCHLORIDE 2 MG/MIN: 4 INJECTION, SOLUTION INTRAVENOUS at 07:54

## 2023-02-09 RX ADMIN — LIDOCAINE HYDROCHLORIDE 100 MG: 20 INJECTION, SOLUTION INFILTRATION; PERINEURAL at 07:41

## 2023-02-09 RX ADMIN — LABETALOL HYDROCHLORIDE 5 MG: 5 INJECTION, SOLUTION INTRAVENOUS at 09:26

## 2023-02-09 RX ADMIN — SODIUM CHLORIDE, POTASSIUM CHLORIDE, SODIUM LACTATE AND CALCIUM CHLORIDE 9 ML/HR: 600; 310; 30; 20 INJECTION, SOLUTION INTRAVENOUS at 06:59

## 2023-02-09 RX ADMIN — CEFAZOLIN SODIUM 2 G: 2 INJECTION, SOLUTION INTRAVENOUS at 20:02

## 2023-02-09 RX ADMIN — ESMOLOL HYDROCHLORIDE 10 MG: 10 INJECTION, SOLUTION INTRAVENOUS at 09:15

## 2023-02-09 RX ADMIN — GLYCOPYRROLATE 0.2 MCG: 1 INJECTION INTRAMUSCULAR; INTRAVENOUS at 08:09

## 2023-02-09 RX ADMIN — DEXAMETHASONE SODIUM PHOSPHATE 8 MG: 4 INJECTION, SOLUTION INTRAMUSCULAR; INTRAVENOUS at 07:54

## 2023-02-09 RX ADMIN — ALVIMOPAN 12 MG: 12 CAPSULE ORAL at 06:57

## 2023-02-09 RX ADMIN — GABAPENTIN 300 MG: 300 CAPSULE ORAL at 06:57

## 2023-02-09 RX ADMIN — Medication 10 MG: at 10:08

## 2023-02-09 RX ADMIN — SODIUM CHLORIDE, POTASSIUM CHLORIDE, SODIUM LACTATE AND CALCIUM CHLORIDE: 600; 310; 30; 20 INJECTION, SOLUTION INTRAVENOUS at 11:46

## 2023-02-09 RX ADMIN — HYDROMORPHONE HYDROCHLORIDE 0.5 MG: 1 INJECTION, SOLUTION INTRAMUSCULAR; INTRAVENOUS; SUBCUTANEOUS at 09:00

## 2023-02-09 RX ADMIN — Medication 10 MG: at 09:07

## 2023-02-09 RX ADMIN — METRONIDAZOLE 500 MG: 500 INJECTION, SOLUTION INTRAVENOUS at 07:33

## 2023-02-09 RX ADMIN — ROCURONIUM BROMIDE 10 MG: 10 INJECTION, SOLUTION INTRAVENOUS at 09:00

## 2023-02-09 RX ADMIN — KETOROLAC TROMETHAMINE 15 MG: 15 INJECTION, SOLUTION INTRAMUSCULAR; INTRAVENOUS at 20:02

## 2023-02-09 RX ADMIN — SODIUM CHLORIDE, POTASSIUM CHLORIDE, SODIUM LACTATE AND CALCIUM CHLORIDE 9 ML/HR: 600; 310; 30; 20 INJECTION, SOLUTION INTRAVENOUS at 07:00

## 2023-02-09 RX ADMIN — FAMOTIDINE 20 MG: 10 INJECTION INTRAVENOUS at 06:58

## 2023-02-09 RX ADMIN — GABAPENTIN 300 MG: 300 CAPSULE ORAL at 20:02

## 2023-02-09 RX ADMIN — LABETALOL HYDROCHLORIDE 5 MG: 5 INJECTION, SOLUTION INTRAVENOUS at 11:31

## 2023-02-09 RX ADMIN — ACETAMINOPHEN 1000 MG: 500 TABLET ORAL at 06:57

## 2023-02-09 RX ADMIN — CALCITRIOL 0.25 MCG: 0.25 CAPSULE ORAL at 16:01

## 2023-02-09 RX ADMIN — FENTANYL CITRATE 50 MCG: 50 INJECTION, SOLUTION INTRAMUSCULAR; INTRAVENOUS at 07:37

## 2023-02-09 RX ADMIN — Medication 10 MG: at 11:01

## 2023-02-09 RX ADMIN — MIDAZOLAM 0.5 MG: 1 INJECTION INTRAMUSCULAR; INTRAVENOUS at 07:04

## 2023-02-09 RX ADMIN — HYDROMORPHONE HYDROCHLORIDE 0.5 MG: 1 INJECTION, SOLUTION INTRAMUSCULAR; INTRAVENOUS; SUBCUTANEOUS at 13:49

## 2023-02-09 RX ADMIN — CELECOXIB 200 MG: 200 CAPSULE ORAL at 06:56

## 2023-02-09 RX ADMIN — Medication 20 MG: at 08:16

## 2023-02-09 NOTE — OP NOTE
SURGERY  Operative Note :  ERAN    Stefani Shirley  1950    Procedure Date: 02/09/23    Pre-op Diagnosis:  • Endoscopically unresectable 3 cm carpet polyp ileocecal valve tubulovillous adenoma  • Body mass index is 33.73 kg/m².  • Sister with history of ovarian cancer  • Sister with history of breast cancer  • MUTYH single allele mutation  • Hypoparathyroidism, postsurgical, on calcitriol  • History of thyroid cancer    Post-op Diagnosis:  · Same    Procedure:   · Laparoscopic right hemicolectomy  · Laparoscopic bilateral salpingo-oophorectomy    Surgeon: Eran    Indications:  · As above    Colon Resection  Operation performed with curative intent Yes   Tumor Location Cecum   Extent of colon and vascular resection Right hemicolectomy - ileocolic, right colic (if present)            Associated Issues:  · Difficulty with hearing    Findings:   · Challenging case due to patient's habitus with central obesity and BMI    Recommendations:   · Routine recuperation    Specimens:   Endo photo  ·     Pre op CT    EBL: 150 cc    Technique:     General anesthetic was induced, abdomen prepped with ChloraPrep and draped sterilely, split legs, Hidalgo catheter and draped sterilely.  Small incision above the umbilicus, CO2 insufflated via the Veress needle followed by a 12 mm trocar and a 10 mm scope, ultimately exchanged for an angled scope.  3 additional trocars were placed being a 5 mm in the left lower paramedian, 5 mm in the right lower quadrant, 5 mm in the left lateral.  The 5 mm in the left paramedian in the lower abdomen was ultimately exchanged for a 12 mm.    We began with the patient's head down and I lifted the cecum and scored the peritoneum of the cecum and coming around the small bowel of the retroperitoneum.  Ureter was identified and avoided it was fairly medial.  I then dissected up behind the mesentery of the small bowel.  The omentum and epiploica were so heavy that it was really difficult to lift the  small bowel in the right colon and thus I went ahead and took down the white line of Toldt and came around that direction.  Then completed this all the way up to the hepatic flexure.  Despite this I had difficulty coming medially and seeing the duodenum.    We then put the patient's head up, and I went to the mid transverse colon.  I lifted that up in on the undersurface scored where I thought the middle colic vessel was.  We clearly saw the vein there.  I then opened the peritoneum of the right colon with the Enseal heading down to the small bowel so that I could see the vessels.    And then went up to the mid transverse anteriorly and divided that peritoneum, ultimately going to the lesser omentum freeing the omentum down with it.  A portion of the omentum was resected separately.  I came around the hepatic flexure and ultimately did see the duodenum.  Once it had been identified I dissected out the right colic vessel and stapled across it with a gray load.  I then dissected out the ileocolic and transected it with a gray load as well.  Thus the entire mesentery was divided for Exofin of the colon.    We then addressed the ovaries.  With her head down, I grasped the right ovary and took down the bowel movement with the Enseal, took some of the ovarian artery with Enseal and part with clips.  I stapled across the tube and with some additional areolar tissue division with the Enseal this was released.  We placed that into a bag.    I went to the left side and hear the ovarian vasculature looked much larger.  Thus I took down the tube from the uterus with the Enseal, divided the ligament and dissected out the vasculature completely up to the ovary to make sure there was no involvement of the ureter.  This was stapled across with the gray load.  It was placed in a separate bag.    We then made an incision in the right upper quadrant transversely, coming through the subcutaneous tissue which was about 5 cm in depth,  the anterior rectus, rectus muscle and posterior rectus.  Medium wound protector was placed.  Both ovaries were brought out through the incision.  We had made sure there was no bleeding prior to opening so that we would not have to go back and that this did require some additional clips on the vasculature on the right ovary.  At this point irrigated into the pelvis to make sure there was no bleeding there and there was none.  I irrigated on the right side of the abdomen and there was more than I would have anticipated, and thus we repeated that later and it was clear.  Having reassurance that there was no bleeding we went ahead and prepared for our colon resection.    Bowel was brought up through the opening very very easily.  I we had a takedown with the small bowel mesentery at the arcade and this was done with the Enseal.  We used a Doppler to confirm good signal at the transverse colon.  I had divided the mesentery all the way up to the colon almost.  That signal was outstanding.  We confirmed good signal on the small bowel as well.  Those 2 ends were stapled across transversely with a blue load each [place].  The stump end of the transverse colon was oversewn with interrupted 3-0 Vicryl's.  I then arranged the colon such that the end of the small bowel was heading upwards and would be anastomosis with the most dependent portion of the transverse colon.  I did a handsewn end ileostomy to side transverse colon anastomosis, with 3-0 Vicryl to the seromuscular interrupted, followed by running 3-0 locking chromic on the posterior row, canneling the anterior aspect.  Anterior aspect was then reinforced with interrupted 3-0 Vicryl's.  The mesentery was closed with interrupted 3-0 Vicryl's.  Betadine was applied to the anastomosis.  That was placed back into the abdominal cavity.  Once again we irrigated and suctioned to make sure there was no bleeding.    Exparel was injected predominantly laterally at the right upper  quadrant incision site.  The posterior rectus was closed with a running 0 PDS, anterior similarly.  The 2 -12 mm trocar sites were closed the fascial level with a 0 Vicryl suture.  Skin at the 12 mm and extraction site closed the dermis with 3-0 Vicryl.  All sites at the skin with 5-0 Vicryl.        Norma Barillas MD  02/09/23  13:00 EST      Assistant Waggoner was responsible for performing the following activities: Retraction, Suction, Irrigation, Suturing, Closing, Placing Dressing and Held/Positioned Camera and their skilled assistance was necessary for the success of this case.

## 2023-02-09 NOTE — ANESTHESIA PROCEDURE NOTES
Airway  Urgency: elective    Date/Time: 2/9/2023 7:44 AM  Airway not difficult    General Information and Staff    Patient location during procedure: OR  Anesthesiologist: Paul Crouch MD  CRNA/CAA: Russell Espinosa CRNA    Indications and Patient Condition  Indications for airway management: airway protection    Preoxygenated: yes  MILS maintained throughout  Mask difficulty assessment: 2 - vent by mask + OA or adjuvant +/- NMBA    Final Airway Details  Final airway type: endotracheal airway      Successful airway: ETT  Cuffed: yes   Successful intubation technique: direct laryngoscopy  Facilitating devices/methods: intubating stylet  Endotracheal tube insertion site: oral  Blade: Espino  Blade size: 2  ETT size (mm): 7.0  Cormack-Lehane Classification: grade I - full view of glottis  Placement verified by: chest auscultation and capnometry   Cuff volume (mL): 8  Measured from: lips  ETT/EBT  to lips (cm): 22  Number of attempts at approach: 1  Assessment: lips, teeth, and gum same as pre-op and atraumatic intubation

## 2023-02-09 NOTE — ANESTHESIA PREPROCEDURE EVALUATION
Anesthesia Evaluation     Patient summary reviewed and Nursing notes reviewed   history of anesthetic complications: PONV               Airway   Mallampati: II  TM distance: >3 FB  Neck ROM: limited  Dental      Pulmonary - negative pulmonary ROS   Cardiovascular     ECG reviewed  Rhythm: regular  Rate: normal    (+) hyperlipidemia,       Neuro/Psych  (+) psychiatric history Depression,    GI/Hepatic/Renal/Endo    (+)   liver disease fatty liver disease, thyroid problem hypothyroidism    Musculoskeletal     Abdominal    Substance History - negative use     OB/GYN negative ob/gyn ROS         Other   arthritis,    history of cancer                  Anesthesia Plan    ASA 3     general     (Obed present today  Patient's upper incisors are capped    I have reviewed the patient's history with the patient and the chart, including all pertinent laboratory results and imaging. I have explained the risks of anesthesia including but not limited to dental damage, corneal abrasion, nerve injury, MI, stroke, and death. Questions asked and answered. Anesthetic plan discussed with patient and team as indicated. Patient expressed understanding of the above.  )  intravenous induction     Anesthetic plan, risks, benefits, and alternatives have been provided, discussed and informed consent has been obtained with: patient and spouse/significant other.        CODE STATUS:

## 2023-02-09 NOTE — PLAN OF CARE
Goal Outcome Evaluation:            Outcome Evaluation: VSS. IV fluids infusing. Hidalgo catheter removed, pt voided. Son at bedside. Abd incision with liquid adhesive. Ambulated in hallway.

## 2023-02-09 NOTE — ANESTHESIA POSTPROCEDURE EVALUATION
Patient: Stefani Watson    Procedure Summary     Date: 02/09/23 Room / Location: [facility] OR  / [facility] MAIN OR    Anesthesia Start: 0733 Anesthesia Stop: 1203    Procedure: laparoscopic right hemicolectomyconverted to open  bilateral ooperectomy (Abdomen) Diagnosis:       Ovarian cystic mass      (Ovarian cystic mass [N83.209])    Surgeons: Norma Barillas MD Provider: Paul Crouch MD    Anesthesia Type: general ASA Status: 3          Anesthesia Type: general    Vitals  Vitals Value Taken Time   /70 02/09/23 1401   Temp 36.9 °C (98.4 °F) 02/09/23 1200   Pulse 73 02/09/23 1406   Resp 18 02/09/23 1345   SpO2 93 % 02/09/23 1406   Vitals shown include unvalidated device data.        Post Anesthesia Care and Evaluation    Patient location during evaluation: PACU  Patient participation: complete - patient participated  Level of consciousness: awake and alert  Pain management: adequate    Airway patency: patent  Anesthetic complications: No anesthetic complications    Cardiovascular status: acceptable  Respiratory status: acceptable  Hydration status: acceptable    Comments: --------------------            02/09/23               1345     --------------------   BP:       134/77     Pulse:      74       Resp:       18       Temp:                SpO2:      95%      --------------------

## 2023-02-09 NOTE — INTERVAL H&P NOTE
H&P updated. The patient was examined and the following changes are noted:    cea 3.08, hgb 10.8

## 2023-02-10 LAB
ANION GAP SERPL CALCULATED.3IONS-SCNC: 8.2 MMOL/L (ref 5–15)
BASOPHILS # BLD AUTO: 0.02 10*3/MM3 (ref 0–0.2)
BASOPHILS NFR BLD AUTO: 0.2 % (ref 0–1.5)
BUN SERPL-MCNC: 19 MG/DL (ref 8–23)
BUN/CREAT SERPL: 11.4 (ref 7–25)
CALCIUM SPEC-SCNC: 7.3 MG/DL (ref 8.6–10.5)
CHLORIDE SERPL-SCNC: 100 MMOL/L (ref 98–107)
CO2 SERPL-SCNC: 23.8 MMOL/L (ref 22–29)
CREAT SERPL-MCNC: 1.67 MG/DL (ref 0.57–1)
DEPRECATED RDW RBC AUTO: 44.7 FL (ref 37–54)
EGFRCR SERPLBLD CKD-EPI 2021: 32 ML/MIN/1.73
EOSINOPHIL # BLD AUTO: 0 10*3/MM3 (ref 0–0.4)
EOSINOPHIL NFR BLD AUTO: 0 % (ref 0.3–6.2)
ERYTHROCYTE [DISTWIDTH] IN BLOOD BY AUTOMATED COUNT: 16.3 % (ref 12.3–15.4)
GLUCOSE SERPL-MCNC: 136 MG/DL (ref 65–99)
HCT VFR BLD AUTO: 30.6 % (ref 34–46.6)
HGB BLD-MCNC: 9.3 G/DL (ref 12–15.9)
IMM GRANULOCYTES # BLD AUTO: 0.07 10*3/MM3 (ref 0–0.05)
IMM GRANULOCYTES NFR BLD AUTO: 0.5 % (ref 0–0.5)
LYMPHOCYTES # BLD AUTO: 1.32 10*3/MM3 (ref 0.7–3.1)
LYMPHOCYTES NFR BLD AUTO: 10 % (ref 19.6–45.3)
MCH RBC QN AUTO: 23.3 PG (ref 26.6–33)
MCHC RBC AUTO-ENTMCNC: 30.4 G/DL (ref 31.5–35.7)
MCV RBC AUTO: 76.5 FL (ref 79–97)
MONOCYTES # BLD AUTO: 1.06 10*3/MM3 (ref 0.1–0.9)
MONOCYTES NFR BLD AUTO: 8 % (ref 5–12)
NEUTROPHILS NFR BLD AUTO: 10.7 10*3/MM3 (ref 1.7–7)
NEUTROPHILS NFR BLD AUTO: 81.3 % (ref 42.7–76)
NRBC BLD AUTO-RTO: 0 /100 WBC (ref 0–0.2)
PLATELET # BLD AUTO: 290 10*3/MM3 (ref 140–450)
PMV BLD AUTO: 10.1 FL (ref 6–12)
POTASSIUM SERPL-SCNC: 4.1 MMOL/L (ref 3.5–5.2)
RBC # BLD AUTO: 4 10*6/MM3 (ref 3.77–5.28)
SODIUM SERPL-SCNC: 132 MMOL/L (ref 136–145)
WBC NRBC COR # BLD: 13.17 10*3/MM3 (ref 3.4–10.8)

## 2023-02-10 PROCEDURE — 25010000002 CEFAZOLIN IN DEXTROSE 2-4 GM/100ML-% SOLUTION: Performed by: SURGERY

## 2023-02-10 PROCEDURE — 25010000002 KETOROLAC TROMETHAMINE PER 15 MG: Performed by: SURGERY

## 2023-02-10 PROCEDURE — 85025 COMPLETE CBC W/AUTO DIFF WBC: CPT | Performed by: SURGERY

## 2023-02-10 PROCEDURE — 80048 BASIC METABOLIC PNL TOTAL CA: CPT | Performed by: SURGERY

## 2023-02-10 RX ORDER — OXYCODONE HYDROCHLORIDE 5 MG/1
5 TABLET ORAL EVERY 4 HOURS PRN
Qty: 10 TABLET | Refills: 0 | Status: SHIPPED | OUTPATIENT
Start: 2023-02-10 | End: 2023-02-19

## 2023-02-10 RX ADMIN — LEVOTHYROXINE SODIUM 125 MCG: 0.12 TABLET ORAL at 05:05

## 2023-02-10 RX ADMIN — ACETAMINOPHEN 1000 MG: 500 TABLET, FILM COATED ORAL at 04:02

## 2023-02-10 RX ADMIN — OXYCODONE HYDROCHLORIDE 5 MG: 5 TABLET ORAL at 23:56

## 2023-02-10 RX ADMIN — GABAPENTIN 300 MG: 300 CAPSULE ORAL at 21:02

## 2023-02-10 RX ADMIN — PRAVASTATIN SODIUM 40 MG: 40 TABLET ORAL at 21:04

## 2023-02-10 RX ADMIN — ACETAMINOPHEN 1000 MG: 500 TABLET, FILM COATED ORAL at 21:02

## 2023-02-10 RX ADMIN — CALCITRIOL 0.25 MCG: 0.25 CAPSULE ORAL at 09:04

## 2023-02-10 RX ADMIN — LATANOPROST 1 DROP: 50 SOLUTION OPHTHALMIC at 21:03

## 2023-02-10 RX ADMIN — KETOROLAC TROMETHAMINE 15 MG: 15 INJECTION, SOLUTION INTRAMUSCULAR; INTRAVENOUS at 01:11

## 2023-02-10 RX ADMIN — GABAPENTIN 300 MG: 300 CAPSULE ORAL at 09:04

## 2023-02-10 RX ADMIN — CEFAZOLIN SODIUM 2 G: 2 INJECTION, SOLUTION INTRAVENOUS at 04:02

## 2023-02-10 RX ADMIN — ACETAMINOPHEN 1000 MG: 500 TABLET, FILM COATED ORAL at 15:38

## 2023-02-10 RX ADMIN — ACETAMINOPHEN 1000 MG: 500 TABLET, FILM COATED ORAL at 09:04

## 2023-02-10 RX ADMIN — SODIUM CHLORIDE, POTASSIUM CHLORIDE, SODIUM LACTATE AND CALCIUM CHLORIDE 50 ML/HR: 600; 310; 30; 20 INJECTION, SOLUTION INTRAVENOUS at 13:01

## 2023-02-10 RX ADMIN — METRONIDAZOLE 500 MG: 500 INJECTION, SOLUTION INTRAVENOUS at 01:11

## 2023-02-10 RX ADMIN — KETOROLAC TROMETHAMINE 15 MG: 15 INJECTION, SOLUTION INTRAMUSCULAR; INTRAVENOUS at 06:38

## 2023-02-10 RX ADMIN — ALVIMOPAN 12 MG: 12 CAPSULE ORAL at 09:04

## 2023-02-10 RX ADMIN — CITALOPRAM 20 MG: 20 TABLET, FILM COATED ORAL at 09:04

## 2023-02-10 NOTE — PLAN OF CARE
Goal Outcome Evaluation:           Progress: improving  Outcome Evaluation: VSS. Pt ambulating around unit frequently throughout shift. Pt on RA. Tolerating regular diet. Bowel sounds audible in all quadrants. Tylenol sufficient for pain management. IVF running as ordered. Pt chewing gum as ordered. Waiting for flatus. Pt likely to d/c tomorrow.

## 2023-02-10 NOTE — DISCHARGE SUMMARY
Discharge Summary    Patient name: Stefani Watson    Medical record number: [id number]    Admission date: 2/9/2023  Discharge date: 2/11/2023    Attending physician: Dr. Norma Barillas    Primary care physician: Mindy Arias MD    Consulting physician(s):  none    Primary Diagnoses:    · Carpet polyp, cecum and ileocecal valve    Secondary Diagnoses:     · Bilateral ovarian masses  • Endoscopically unresectable 3 cm carpet polyp ileocecal valve tubulovillous adenoma  • Body mass index is 33.73 kg/m².  • Sister with history of ovarian cancer  • Sister with history of breast cancer  • MUTYH single allele mutation  • Hypoparathyroidism, postsurgical, on calcitriol  • History of thyroid cancer  · Difficulty with hearing     Procedure/Proc Date:      · Laparoscopic right hemicolectomy and bilateral salpingooopherectomy: 2/9/2023    Hospital Course:   The patient is a very pleasant 72 y.o. female that was admitted to the hospital on 2/9/2023 for the aforementioned surgery.  She underwent the surgery which was uneventful other than being challenging due to her central obesity and BMI.  Post op, she did have a transient increase in creatinine, which stabilized by the next day.  She was started on a diet and regained bowel function in the evening hours of 2/10/2023.  She had several bowel movements starting that night and into the next morning.  She was able to tolerate a diet without difficulty and was drinking plenty of fluids.  Her urine was not concentrated.  She was up and ambulating without difficulty.  She was felt ready for discharge to home on 2/11/2023.    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  Invasive component is 0.2 cm in size.  All lymph nodes are negative, a total of 15.  Stage is T1 N0 M0.  Both ovaries had benign serous cystadenofibroma.    Discharge medications:      Your medication list      ASK your doctor about these  medications      Instructions Last Dose Given Next Dose Due   calcitriol 0.25 MCG capsule  Commonly known as: ROCALTROL      Take 1 capsule by mouth Daily.       calcium carbonate 600 MG tablet  Commonly known as: OS-DIGNA      Take 600 mg by mouth Daily. PT DOES NOT KNOW DOSE-TAKES TWO TAB       citalopram 20 MG tablet  Commonly known as: CeleXA      TAKE 1 TABLET BY MOUTH DAILY       ferrous gluconate 324 MG tablet  Commonly known as: FERGON      Take 1 tablet by mouth Daily With Breakfast.       latanoprost 0.005 % ophthalmic solution  Commonly known as: XALATAN      Administer 1 drop to both eyes Every Night.       levothyroxine 125 MCG tablet  Commonly known as: SYNTHROID, LEVOTHROID      TAKE 1 TABLET BY MOUTH EVERY MORNING       MAGNESIUM OXIDE PO      Take 64 mg by mouth Daily. Takes 2 tablets daily       meloxicam 7.5 MG tablet  Commonly known as: Mobic      Take 1 tablet by mouth Daily As Needed (joint pain).       pravastatin 40 MG tablet  Commonly known as: PRAVACHOL      TAKE 1 TABLET BY MOUTH DAILY              Discharge diet:  · Low residue for one week.    Recommendations:    · Follow up in the office  · DC RX done and DC instructions in AVS        Norma Barillas MD  Office Number: [phone].

## 2023-02-10 NOTE — PROGRESS NOTES
GENERAL SURGERY  Stefani Watson  1950  1 Day Post-Op    HPI:  Feels good.  Tolerating diet.  crampy pain.  No flatus or bowel movement. One dose of Toradol.  Creatinine up    Diet:  Gi soft diet     Vitals:    02/09/23 2156 02/10/23 0111 02/10/23 0503 02/10/23 0950   BP: 101/60 100/58 104/60 97/56   BP Location: Right arm Right arm Right arm Right arm   Patient Position: Lying Lying  Lying   Pulse: 70 72 62 66   Resp: 16 16 16 16   Temp: 96 °F (35.6 °C) 97.4 °F (36.3 °C) 97.1 °F (36.2 °C) 97.6 °F (36.4 °C)   TempSrc: Oral Oral Oral Oral   SpO2: 93% 93% 95% 94%   Weight:       Height:         Intake & Output (last day)       02/09 0701  02/10 0700 02/10 0701  02/11 0700    P.O. 290     I.V. (mL/kg) 1200 (12.7)     Total Intake(mL/kg) 1490 (15.8)     Urine (mL/kg/hr) 615 (0.3)     Blood 150     Total Output 765     Net +725           Urine Unmeasured Occurrence 1 x           Physical Exam  HENT:      Head:      Comments: Very hard of hearing  Cardiovascular:      Rate and Rhythm: Normal rate.   Pulmonary:      Effort: Pulmonary effort is normal.   Abdominal:      General: There is distension.      Palpations: Abdomen is soft.      Hernia: No hernia is present.   Musculoskeletal:         General: No swelling.   Skin:     General: Skin is warm.   Neurological:      General: No focal deficit present.      Mental Status: She is alert.   Psychiatric:         Mood and Affect: Mood normal.         Pertinent labs/imaging:  Results from last 7 days   Lab Units 02/10/23  0510   WBC 10*3/mm3 13.17*   HEMOGLOBIN g/dL 9.3*   HEMATOCRIT % 30.6*   PLATELETS 10*3/mm3 290     Results from last 7 days   Lab Units 02/10/23  0510 02/09/23  1529   SODIUM mmol/L 132* 136   POTASSIUM mmol/L 4.1 4.3   CHLORIDE mmol/L 100 104   CO2 mmol/L 23.8 21.0*   BUN mg/dL 19 14   CREATININE mg/dL 1.67* 1.20*   CALCIUM mg/dL 7.3* 7.3*   GLUCOSE mg/dL 136* 188*       Assessment:   • 1 Day Post-Op after lap right hemicolectomy for carpet polyp and BSO  for ovarian masses  • Endoscopically unresectable 3 cm carpet polyp ileocecal valve tubulovillous adenoma  • Body mass index is 33.73 kg/m².  • Sister with history of ovarian cancer  • Sister with history of breast cancer  • MUTYH single allele mutation  • Hypoparathyroidism, postsurgical, on calcitriol  • History of thyroid cancer  • Difficulty with hearing    Plan  · Stay until flatus and creatinine trending down.  Doesn't have to have a bowel movement.    · rx written today for dc pain med.  Declined zofran.  DC instructions in AVS.   · Anticipate discharge tomorrow.  · Leave IVF until creatinine down.    Norma Barillas MD  02/10/23  11:16 EST

## 2023-02-10 NOTE — PLAN OF CARE
Goal Outcome Evaluation:  Plan of Care Reviewed With: patient        Progress: improving  Outcome Evaluation: VSS. Pleasant and cooperative, HOD , with bilateral hearing aids. O2 @ 2L/min, DB, coughing and IS use encouraged.  Incision+ 5 lap sites intact. Abdomen soft, BS faint. Fluid diet tolerated. Due Entereg given. Gum chewed.   On IV Cefazolin and IV Flagyl, pain well controlled with scheduled Tylenol and IV Toradol. Up with assist, voided freely. Ambulated in the hallway. Rested well in between care.

## 2023-02-10 NOTE — PROGRESS NOTES
Continued Stay Note  [facility]     Patient Name: Stefani Watson  MRN: [id number]  Today's Date: 2/10/2023    Admit Date: 2/9/2023    Plan: Home no needs   Discharge Plan     Row Name 02/10/23 1314       Plan    Plan Home no needs    Plan Comments Met with patient who confirmed DC plan is home. Stated her daughter-in-law will assist as needed and will provide transportation. Denies any needs/equipment.               Discharge Codes    No documentation.               Expected Discharge Date and Time     Expected Discharge Date Expected Discharge Time    Feb 11, 2023             Tash Ocasio RN

## 2023-02-11 ENCOUNTER — READMISSION MANAGEMENT (OUTPATIENT)
Dept: CALL CENTER | Facility: HOSPITAL | Age: 73
End: 2023-02-11
Payer: MEDICARE

## 2023-02-11 VITALS
WEIGHT: 207.89 LBS | BODY MASS INDEX: 33.41 KG/M2 | TEMPERATURE: 97.2 F | RESPIRATION RATE: 16 BRPM | HEIGHT: 66 IN | DIASTOLIC BLOOD PRESSURE: 68 MMHG | OXYGEN SATURATION: 95 % | HEART RATE: 60 BPM | SYSTOLIC BLOOD PRESSURE: 116 MMHG

## 2023-02-11 LAB
ANION GAP SERPL CALCULATED.3IONS-SCNC: 9.8 MMOL/L (ref 5–15)
BUN SERPL-MCNC: 22 MG/DL (ref 8–23)
BUN/CREAT SERPL: 13 (ref 7–25)
CALCIUM SPEC-SCNC: 7.2 MG/DL (ref 8.6–10.5)
CHLORIDE SERPL-SCNC: 103 MMOL/L (ref 98–107)
CO2 SERPL-SCNC: 22.2 MMOL/L (ref 22–29)
CREAT SERPL-MCNC: 1.69 MG/DL (ref 0.57–1)
EGFRCR SERPLBLD CKD-EPI 2021: 31.6 ML/MIN/1.73
GLUCOSE SERPL-MCNC: 83 MG/DL (ref 65–99)
POTASSIUM SERPL-SCNC: 4.2 MMOL/L (ref 3.5–5.2)
SODIUM SERPL-SCNC: 135 MMOL/L (ref 136–145)

## 2023-02-11 PROCEDURE — 80048 BASIC METABOLIC PNL TOTAL CA: CPT | Performed by: SURGERY

## 2023-02-11 RX ADMIN — ACETAMINOPHEN 1000 MG: 500 TABLET, FILM COATED ORAL at 09:53

## 2023-02-11 RX ADMIN — GABAPENTIN 300 MG: 300 CAPSULE ORAL at 09:53

## 2023-02-11 RX ADMIN — CALCITRIOL 0.25 MCG: 0.25 CAPSULE ORAL at 09:53

## 2023-02-11 RX ADMIN — ACETAMINOPHEN 1000 MG: 500 TABLET, FILM COATED ORAL at 03:12

## 2023-02-11 RX ADMIN — CITALOPRAM 20 MG: 20 TABLET, FILM COATED ORAL at 09:53

## 2023-02-11 RX ADMIN — LEVOTHYROXINE SODIUM 125 MCG: 0.12 TABLET ORAL at 06:09

## 2023-02-11 NOTE — PLAN OF CARE
Goal Outcome Evaluation:  Plan of Care Reviewed With: patient        Progress: improving  Outcome Evaluation: VSS. On room air. C/o more abdominal cramping, Tylenol and Roxicodone given for pain. Up ad manuel, voided freely, had  loose watery stool  X3, Entereg discontinued. Incision X1 and Lap sites X 5 intact, slight redness noted.  IV Fluid on flow. Slept on and off.

## 2023-02-11 NOTE — OUTREACH NOTE
Prep Survey    Flowsheet Row Responses   [facility] patient discharged from? [place]   Is LACE score < 7 ? No   Eligibility [facility]   Date of Admission 02/09/23   Date of Discharge 02/11/23   Discharge Disposition Home or Self Care   Discharge diagnosis Ovarian cystic mass  laparoscopic right hemicolectomyconverted to open  bilateral ooperectomy   Does the patient have one of the following disease processes/diagnoses(primary or secondary)? General Surgery   Does the patient have Home health ordered? No   Is there a DME ordered? No   Prep survey completed? Yes          Sarai STARR - Registered Nurse

## 2023-02-13 ENCOUNTER — NURSE TRIAGE (OUTPATIENT)
Dept: CALL CENTER | Facility: HOSPITAL | Age: 73
End: 2023-02-13
Payer: MEDICARE

## 2023-02-13 ENCOUNTER — TRANSITIONAL CARE MANAGEMENT TELEPHONE ENCOUNTER (OUTPATIENT)
Dept: CALL CENTER | Facility: HOSPITAL | Age: 73
End: 2023-02-13
Payer: MEDICARE

## 2023-02-13 ENCOUNTER — TELEPHONE (OUTPATIENT)
Dept: SURGERY | Facility: CLINIC | Age: 73
End: 2023-02-13
Payer: MEDICARE

## 2023-02-13 ENCOUNTER — TELEPHONE (OUTPATIENT)
Dept: GYNECOLOGIC ONCOLOGY | Age: 73
End: 2023-02-13

## 2023-02-13 PROCEDURE — 99285 EMERGENCY DEPT VISIT HI MDM: CPT

## 2023-02-13 RX ORDER — SODIUM CHLORIDE 0.9 % (FLUSH) 0.9 %
10 SYRINGE (ML) INJECTION AS NEEDED
Status: DISCONTINUED | OUTPATIENT
Start: 2023-02-13 | End: 2023-03-03

## 2023-02-13 NOTE — PROGRESS NOTES
Case Management Discharge Note      Final Note: Discharged home. Tash Ocasio, [place]         Transportation Services  Private: Car    Final Discharge Disposition Code: 01 - home or self-care

## 2023-02-13 NOTE — TELEPHONE ENCOUNTER
Caller: Stefani Watson    Relationship: Self    Best call back number: [phone]    What is the best time to reach you: ANYTIME    Who are you requesting to speak with (clinical staff, provider,  specific staff member): SCHEDULING        What was the call regarding: PT CANCELED FOLLOW UP APPT IN MARCH, NO LONGER NEEDED AS SHE NO LONGER HAS HER OVARIES    Do you require a callback: IF NEEDED

## 2023-02-13 NOTE — OUTREACH NOTE
Call Center TCM Note    Flowsheet Row Responses   [facility] patient discharged from? [place]   Does the patient have one of the following disease processes/diagnoses(primary or secondary)? General Surgery   TCM attempt successful? No   Unsuccessful attempts Attempt 2          Adela Peña MA    2/13/2023, 16:25 EST

## 2023-02-13 NOTE — TELEPHONE ENCOUNTER
Mom had laprascopic surgery four days ago. Still feels bloated from gas. Taking pain meds a few times daily. Not eating well because of bloated feeling. Advised to increase ambulation, warm liquids, meal replacement drinks until can tolerate solid meals, gas medication etc. Patient took stool softener this morning. Pain is 5/10 mostly but is gas pains and not incisional so they are going to try substituting Tylenol for hydrocodone to decrease the risk of further constipation. If no improvement by tomorrow, will call back for reevaluation.     Reason for Disposition  • [1] Caller has NON-URGENT question AND [2] triager unable to answer question    Additional Information  • Negative: Sounds like a life-threatening emergency to the triager  • Negative: Chest pain  • Negative: Difficulty breathing  • Negative: Acting confused (e.g., disoriented, slurred speech) or excessively sleepy  • Negative: Surgical incision symptoms and questions  • Negative: [1] Discomfort (pain, burning or stinging) when passing urine AND [2] male  • Negative: [1] Discomfort (pain, burning or stinging) when passing urine AND [2] female  • Negative: Constipation  • Negative: New or worsening leg (calf, thigh) pain  • Negative: New or worsening leg swelling  • Negative: Dizziness is severe, or persists > 24 hours after surgery  • Negative: Pain, redness, swelling, or pus at IV Site  • Negative: Symptoms arising from use of a urinary catheter (Hidalgo or Coude)  • Negative: Cast problems or questions  • Negative: Medication question  • Negative: [1] Widespread rash AND [2] bright red, sunburn-like  • Negative: [1] SEVERE headache AND [2] after spinal (epidural) anesthesia  • Negative: [1] Vomiting AND [2] persists > 4 hours  • Negative: [1] Vomiting AND [2] abdomen looks much more swollen than usual  • Negative: [1] Drinking very little AND [2] dehydration suspected (e.g., no urine > 12 hours, very dry mouth, very lightheaded)  • Negative: Patient  "sounds very sick or weak to the triager  • Negative: Sounds like a serious complication to the triager  • Negative: Fever > 100.4 F (38.0 C)  • Negative: [1] SEVERE post-op pain (e.g., excruciating, pain scale 8-10) AND [2] not controlled with pain medications  • Negative: [1] Caller has URGENT question AND [2] triager unable to answer question  • Negative: [1] Headache AND [2] after spinal (epidural) anesthesia AND [3] not severe  • Negative: Fever present > 3 days (72 hours)  • Negative: [1] MILD-MODERATE post-op pain (e.g., pain scale 1-7) AND [2] not controlled with pain medications    Answer Assessment - Initial Assessment Questions  1. SYMPTOM: \"What's the main symptom you're concerned about?\" (e.g., pain, fever, vomiting)      Gas pain from surgery and constipation   2. ONSET: \"When did gas pain  start?\"      After surgery  3. SURGERY: \"What surgery was performed?\"      4 days prior   4. DATE of SURGERY: \"When was surgery performed?\"       02/09  5. ANESTHESIA: \" What type of anesthesia did you have?\" (e.g., general, spinal, epidural, local)      general  6. PAIN: \"Is there any pain?\" If Yes, ask: \"How bad is it?\"  (Scale 1-10; or mild, moderate, severe)      5  7. FEVER: \"Do you have a fever?\" If Yes, ask: \"What is your temperature, how was it measured, and when did it start?\"      no  8. VOMITING: \"Is there any vomiting?\" If yes, ask: \"How many times?\"      no  9. BLEEDING: \"Is there any bleeding?\" If Yes, ask: \"How much?\" and \"Where?\"      no  10. OTHER SYMPTOMS: \"Do you have any other symptoms?\" (e.g., drainage from wound, painful urination, constipation)        Constipation    Protocols used: POST-OP SYMPTOMS AND QUESTIONS-ADULT-    "

## 2023-02-13 NOTE — TELEPHONE ENCOUNTER
Patient called the office reporting that she has not passed gas today and was only able to have a small bowel movement after taking dulcolax and left over Miralax. She believes her last normal bowel movement was either Saturday or yesterday.  She denies any blood in her stool, reports some nausea but denies any vomiting, no fever and reports her incisions do not show signs of infection, reports decreased appetite also. She states that she has been bloated/swollen and had pain that was present prior to discharge on 2/11.

## 2023-02-13 NOTE — OUTREACH NOTE
Call Center TCM Note    Flowsheet Row Responses   [facility] patient discharged from? [place]   Does the patient have one of the following disease processes/diagnoses(primary or secondary)? General Surgery   TCM attempt successful? No   Unsuccessful attempts Attempt 1          Adela Peña MA    2/13/2023, 12:41 EST

## 2023-02-14 ENCOUNTER — APPOINTMENT (OUTPATIENT)
Dept: GENERAL RADIOLOGY | Facility: HOSPITAL | Age: 73
DRG: 330 | End: 2023-02-14
Payer: MEDICARE

## 2023-02-14 ENCOUNTER — HOSPITAL ENCOUNTER (INPATIENT)
Facility: HOSPITAL | Age: 73
LOS: 14 days | Discharge: HOME | DRG: 330 | End: 2023-03-05
Attending: EMERGENCY MEDICINE | Admitting: SURGERY
Payer: MEDICARE

## 2023-02-14 ENCOUNTER — APPOINTMENT (OUTPATIENT)
Dept: CT IMAGING | Facility: HOSPITAL | Age: 73
DRG: 330 | End: 2023-02-14
Payer: MEDICARE

## 2023-02-14 ENCOUNTER — TRANSITIONAL CARE MANAGEMENT TELEPHONE ENCOUNTER (OUTPATIENT)
Dept: CALL CENTER | Facility: HOSPITAL | Age: 73
End: 2023-02-14
Payer: MEDICARE

## 2023-02-14 ENCOUNTER — ANESTHESIA EVENT (OUTPATIENT)
Dept: PERIOP | Facility: HOSPITAL | Age: 73
DRG: 330 | End: 2023-02-14
Payer: MEDICARE

## 2023-02-14 ENCOUNTER — ANESTHESIA (OUTPATIENT)
Dept: PERIOP | Facility: HOSPITAL | Age: 73
DRG: 330 | End: 2023-02-14
Payer: MEDICARE

## 2023-02-14 DIAGNOSIS — K56.609 SBO (SMALL BOWEL OBSTRUCTION): Primary | ICD-10-CM

## 2023-02-14 DIAGNOSIS — R10.9 ABDOMINAL PAIN: ICD-10-CM

## 2023-02-14 DIAGNOSIS — R11.2 NAUSEA AND VOMITING, UNSPECIFIED VOMITING TYPE: ICD-10-CM

## 2023-02-14 DIAGNOSIS — N13.30 HYDROURETERONEPHROSIS: ICD-10-CM

## 2023-02-14 DIAGNOSIS — R93.89 ABNORMAL CT SCAN: ICD-10-CM

## 2023-02-14 PROBLEM — C18.2 MALIGNANT NEOPLASM OF ASCENDING COLON: Status: ACTIVE | Noted: 2023-01-09

## 2023-02-14 LAB
ALBUMIN SERPL-MCNC: 3.6 G/DL (ref 3.5–5.2)
ALBUMIN/GLOB SERPL: 1.2 G/DL
ALP SERPL-CCNC: 74 U/L (ref 39–117)
ALT SERPL W P-5'-P-CCNC: 27 U/L (ref 1–33)
ANION GAP SERPL CALCULATED.3IONS-SCNC: 10 MMOL/L (ref 5–15)
AST SERPL-CCNC: 36 U/L (ref 1–32)
BASOPHILS # BLD AUTO: 0.06 10*3/MM3 (ref 0–0.2)
BASOPHILS NFR BLD AUTO: 0.4 % (ref 0–1.5)
BILIRUB SERPL-MCNC: 0.5 MG/DL (ref 0–1.2)
BILIRUB UR QL STRIP: NEGATIVE
BUN SERPL-MCNC: 14 MG/DL (ref 8–23)
BUN/CREAT SERPL: 10.1 (ref 7–25)
CALCIUM SPEC-SCNC: 8.2 MG/DL (ref 8.6–10.5)
CHLORIDE SERPL-SCNC: 100 MMOL/L (ref 98–107)
CLARITY UR: CLEAR
CO2 SERPL-SCNC: 21 MMOL/L (ref 22–29)
COLOR UR: YELLOW
CREAT SERPL-MCNC: 1.39 MG/DL (ref 0.57–1)
D-LACTATE SERPL-SCNC: 1.2 MMOL/L (ref 0.5–2)
DEPRECATED RDW RBC AUTO: 49.2 FL (ref 37–54)
EGFRCR SERPLBLD CKD-EPI 2021: 39.9 ML/MIN/1.73
EOSINOPHIL # BLD AUTO: 0.07 10*3/MM3 (ref 0–0.4)
EOSINOPHIL NFR BLD AUTO: 0.5 % (ref 0.3–6.2)
ERYTHROCYTE [DISTWIDTH] IN BLOOD BY AUTOMATED COUNT: 17.5 % (ref 12.3–15.4)
GLOBULIN UR ELPH-MCNC: 3.1 GM/DL
GLUCOSE SERPL-MCNC: 135 MG/DL (ref 65–99)
GLUCOSE UR STRIP-MCNC: NEGATIVE MG/DL
HCT VFR BLD AUTO: 36.6 % (ref 34–46.6)
HGB BLD-MCNC: 10.9 G/DL (ref 12–15.9)
HGB UR QL STRIP.AUTO: NEGATIVE
HOLD SPECIMEN: NORMAL
IMM GRANULOCYTES # BLD AUTO: 0.07 10*3/MM3 (ref 0–0.05)
IMM GRANULOCYTES NFR BLD AUTO: 0.5 % (ref 0–0.5)
KETONES UR QL STRIP: ABNORMAL
LEUKOCYTE ESTERASE UR QL STRIP.AUTO: NEGATIVE
LIPASE SERPL-CCNC: 9 U/L (ref 13–60)
LYMPHOCYTES # BLD AUTO: 0.96 10*3/MM3 (ref 0.7–3.1)
LYMPHOCYTES NFR BLD AUTO: 7.1 % (ref 19.6–45.3)
MCH RBC QN AUTO: 23.4 PG (ref 26.6–33)
MCHC RBC AUTO-ENTMCNC: 29.8 G/DL (ref 31.5–35.7)
MCV RBC AUTO: 78.7 FL (ref 79–97)
MONOCYTES # BLD AUTO: 1.13 10*3/MM3 (ref 0.1–0.9)
MONOCYTES NFR BLD AUTO: 8.4 % (ref 5–12)
NEUTROPHILS NFR BLD AUTO: 11.22 10*3/MM3 (ref 1.7–7)
NEUTROPHILS NFR BLD AUTO: 83.1 % (ref 42.7–76)
NITRITE UR QL STRIP: NEGATIVE
NRBC BLD AUTO-RTO: 0 /100 WBC (ref 0–0.2)
PH UR STRIP.AUTO: 5.5 [PH] (ref 5–8)
PLATELET # BLD AUTO: 415 10*3/MM3 (ref 140–450)
PMV BLD AUTO: 10.1 FL (ref 6–12)
POTASSIUM SERPL-SCNC: 4.1 MMOL/L (ref 3.5–5.2)
PROT SERPL-MCNC: 6.7 G/DL (ref 6–8.5)
PROT UR QL STRIP: ABNORMAL
RBC # BLD AUTO: 4.65 10*6/MM3 (ref 3.77–5.28)
SODIUM SERPL-SCNC: 131 MMOL/L (ref 136–145)
SP GR UR STRIP: 1.03 (ref 1–1.03)
UROBILINOGEN UR QL STRIP: ABNORMAL
WBC NRBC COR # BLD: 13.51 10*3/MM3 (ref 3.4–10.8)
WHOLE BLOOD HOLD COAG: NORMAL
WHOLE BLOOD HOLD SPECIMEN: NORMAL

## 2023-02-14 PROCEDURE — 0T768DZ DILATION OF RIGHT URETER WITH INTRALUMINAL DEVICE, VIA NATURAL OR ARTIFICIAL OPENING ENDOSCOPIC: ICD-10-PCS | Performed by: UROLOGY

## 2023-02-14 PROCEDURE — 25010000002 DEXAMETHASONE SODIUM PHOSPHATE 20 MG/5ML SOLUTION: Performed by: ANESTHESIOLOGY

## 2023-02-14 PROCEDURE — 81003 URINALYSIS AUTO W/O SCOPE: CPT

## 2023-02-14 PROCEDURE — C1769 GUIDE WIRE: HCPCS | Performed by: UROLOGY

## 2023-02-14 PROCEDURE — 25010000002 ONDANSETRON PER 1 MG: Performed by: SURGERY

## 2023-02-14 PROCEDURE — 25010000002 CEFTRIAXONE PER 250 MG: Performed by: SURGERY

## 2023-02-14 PROCEDURE — 83690 ASSAY OF LIPASE: CPT

## 2023-02-14 PROCEDURE — 25010000002 PROPOFOL 1000 MG/100ML EMULSION: Performed by: ANESTHESIOLOGY

## 2023-02-14 PROCEDURE — 85025 COMPLETE CBC W/AUTO DIFF WBC: CPT

## 2023-02-14 PROCEDURE — 25010000002 ONDANSETRON PER 1 MG: Performed by: EMERGENCY MEDICINE

## 2023-02-14 PROCEDURE — 25010000002 MORPHINE PER 10 MG: Performed by: EMERGENCY MEDICINE

## 2023-02-14 PROCEDURE — 25010000002 IOPAMIDOL 61 % SOLUTION: Performed by: EMERGENCY MEDICINE

## 2023-02-14 PROCEDURE — 80053 COMPREHEN METABOLIC PANEL: CPT

## 2023-02-14 PROCEDURE — 0 IOTHALAMATE 60 % SOLUTION: Performed by: UROLOGY

## 2023-02-14 PROCEDURE — C1758 CATHETER, URETERAL: HCPCS | Performed by: UROLOGY

## 2023-02-14 PROCEDURE — 83605 ASSAY OF LACTIC ACID: CPT

## 2023-02-14 PROCEDURE — 74177 CT ABD & PELVIS W/CONTRAST: CPT

## 2023-02-14 PROCEDURE — 74420 UROGRAPHY RTRGR +-KUB: CPT

## 2023-02-14 PROCEDURE — 87040 BLOOD CULTURE FOR BACTERIA: CPT | Performed by: EMERGENCY MEDICINE

## 2023-02-14 PROCEDURE — 25010000002 FENTANYL CITRATE (PF) 50 MCG/ML SOLUTION: Performed by: ANESTHESIOLOGY

## 2023-02-14 PROCEDURE — BT1D1ZZ FLUOROSCOPY OF RIGHT KIDNEY, URETER AND BLADDER USING LOW OSMOLAR CONTRAST: ICD-10-PCS | Performed by: UROLOGY

## 2023-02-14 PROCEDURE — C2617 STENT, NON-COR, TEM W/O DEL: HCPCS | Performed by: UROLOGY

## 2023-02-14 PROCEDURE — 25010000002 MORPHINE PER 10 MG: Performed by: SURGERY

## 2023-02-14 PROCEDURE — 25010000002 ONDANSETRON PER 1 MG: Performed by: ANESTHESIOLOGY

## 2023-02-14 DEVICE — URETERAL STENT
Type: IMPLANTABLE DEVICE | Site: URETER | Status: FUNCTIONAL
Brand: CONTOUR™

## 2023-02-14 RX ORDER — LEVOTHYROXINE SODIUM 0.12 MG/1
125 TABLET ORAL
Status: DISCONTINUED | OUTPATIENT
Start: 2023-02-15 | End: 2023-03-05 | Stop reason: HOSPADM

## 2023-02-14 RX ORDER — DROPERIDOL 2.5 MG/ML
0.62 INJECTION, SOLUTION INTRAMUSCULAR; INTRAVENOUS ONCE AS NEEDED
Status: DISCONTINUED | OUTPATIENT
Start: 2023-02-14 | End: 2023-02-14 | Stop reason: HOSPADM

## 2023-02-14 RX ORDER — DIPHENHYDRAMINE HYDROCHLORIDE 50 MG/ML
12.5 INJECTION INTRAMUSCULAR; INTRAVENOUS
Status: DISCONTINUED | OUTPATIENT
Start: 2023-02-14 | End: 2023-02-14 | Stop reason: HOSPADM

## 2023-02-14 RX ORDER — SODIUM CHLORIDE 0.9 % (FLUSH) 0.9 %
10 SYRINGE (ML) INJECTION AS NEEDED
Status: DISCONTINUED | OUTPATIENT
Start: 2023-02-14 | End: 2023-03-03

## 2023-02-14 RX ORDER — ACETAMINOPHEN 650 MG/1
650 SUPPOSITORY RECTAL EVERY 4 HOURS PRN
Status: DISCONTINUED | OUTPATIENT
Start: 2023-02-14 | End: 2023-03-05 | Stop reason: HOSPADM

## 2023-02-14 RX ORDER — PRAVASTATIN SODIUM 40 MG
40 TABLET ORAL NIGHTLY
Status: DISCONTINUED | OUTPATIENT
Start: 2023-02-14 | End: 2023-03-05 | Stop reason: HOSPADM

## 2023-02-14 RX ORDER — PROPOFOL 10 MG/ML
INJECTION, EMULSION INTRAVENOUS AS NEEDED
Status: DISCONTINUED | OUTPATIENT
Start: 2023-02-14 | End: 2023-02-14 | Stop reason: SURG

## 2023-02-14 RX ORDER — HYDROMORPHONE HYDROCHLORIDE 1 MG/ML
0.25 INJECTION, SOLUTION INTRAMUSCULAR; INTRAVENOUS; SUBCUTANEOUS
Status: DISCONTINUED | OUTPATIENT
Start: 2023-02-14 | End: 2023-02-14 | Stop reason: HOSPADM

## 2023-02-14 RX ORDER — HYDROCODONE BITARTRATE AND ACETAMINOPHEN 5; 325 MG/1; MG/1
1 TABLET ORAL EVERY 4 HOURS PRN
Status: DISCONTINUED | OUTPATIENT
Start: 2023-02-14 | End: 2023-02-22 | Stop reason: SDUPTHER

## 2023-02-14 RX ORDER — PROMETHAZINE HYDROCHLORIDE 25 MG/1
25 SUPPOSITORY RECTAL ONCE AS NEEDED
Status: DISCONTINUED | OUTPATIENT
Start: 2023-02-14 | End: 2023-02-14 | Stop reason: HOSPADM

## 2023-02-14 RX ORDER — SODIUM CHLORIDE 9 MG/ML
40 INJECTION, SOLUTION INTRAVENOUS AS NEEDED
Status: DISCONTINUED | OUTPATIENT
Start: 2023-02-14 | End: 2023-03-03

## 2023-02-14 RX ORDER — HYDRALAZINE HYDROCHLORIDE 20 MG/ML
5 INJECTION INTRAMUSCULAR; INTRAVENOUS
Status: DISCONTINUED | OUTPATIENT
Start: 2023-02-14 | End: 2023-02-14 | Stop reason: HOSPADM

## 2023-02-14 RX ORDER — CALCITRIOL 0.25 UG/1
0.25 CAPSULE, LIQUID FILLED ORAL DAILY
Status: DISCONTINUED | OUTPATIENT
Start: 2023-02-14 | End: 2023-03-05 | Stop reason: HOSPADM

## 2023-02-14 RX ORDER — HYDROCODONE BITARTRATE AND ACETAMINOPHEN 7.5; 325 MG/1; MG/1
1 TABLET ORAL ONCE AS NEEDED
Status: DISCONTINUED | OUTPATIENT
Start: 2023-02-14 | End: 2023-02-14 | Stop reason: HOSPADM

## 2023-02-14 RX ORDER — ONDANSETRON 2 MG/ML
INJECTION INTRAMUSCULAR; INTRAVENOUS AS NEEDED
Status: DISCONTINUED | OUTPATIENT
Start: 2023-02-14 | End: 2023-02-14 | Stop reason: SURG

## 2023-02-14 RX ORDER — FAMOTIDINE 10 MG/ML
20 INJECTION, SOLUTION INTRAVENOUS ONCE
Status: COMPLETED | OUTPATIENT
Start: 2023-02-14 | End: 2023-02-14

## 2023-02-14 RX ORDER — FLUMAZENIL 0.1 MG/ML
0.2 INJECTION INTRAVENOUS AS NEEDED
Status: DISCONTINUED | OUTPATIENT
Start: 2023-02-14 | End: 2023-02-14 | Stop reason: HOSPADM

## 2023-02-14 RX ORDER — SODIUM CHLORIDE 0.9 % (FLUSH) 0.9 %
10 SYRINGE (ML) INJECTION AS NEEDED
Status: DISCONTINUED | OUTPATIENT
Start: 2023-02-14 | End: 2023-03-05 | Stop reason: HOSPADM

## 2023-02-14 RX ORDER — DEXAMETHASONE SODIUM PHOSPHATE 4 MG/ML
INJECTION, SOLUTION INTRA-ARTICULAR; INTRALESIONAL; INTRAMUSCULAR; INTRAVENOUS; SOFT TISSUE AS NEEDED
Status: DISCONTINUED | OUTPATIENT
Start: 2023-02-14 | End: 2023-02-14 | Stop reason: SURG

## 2023-02-14 RX ORDER — DIPHENHYDRAMINE HCL 25 MG
25 CAPSULE ORAL NIGHTLY PRN
Status: DISCONTINUED | OUTPATIENT
Start: 2023-02-14 | End: 2023-02-14

## 2023-02-14 RX ORDER — MAGNESIUM HYDROXIDE 1200 MG/15ML
LIQUID ORAL AS NEEDED
Status: DISCONTINUED | OUTPATIENT
Start: 2023-02-14 | End: 2023-02-14 | Stop reason: HOSPADM

## 2023-02-14 RX ORDER — EPHEDRINE SULFATE 50 MG/ML
5 INJECTION, SOLUTION INTRAVENOUS ONCE AS NEEDED
Status: DISCONTINUED | OUTPATIENT
Start: 2023-02-14 | End: 2023-02-14 | Stop reason: HOSPADM

## 2023-02-14 RX ORDER — DEXTROSE, SODIUM CHLORIDE, AND POTASSIUM CHLORIDE 5; .45; .15 G/100ML; G/100ML; G/100ML
50 INJECTION INTRAVENOUS CONTINUOUS
Status: DISCONTINUED | OUTPATIENT
Start: 2023-02-14 | End: 2023-02-15

## 2023-02-14 RX ORDER — ONDANSETRON 2 MG/ML
4 INJECTION INTRAMUSCULAR; INTRAVENOUS ONCE AS NEEDED
Status: DISCONTINUED | OUTPATIENT
Start: 2023-02-14 | End: 2023-02-14 | Stop reason: HOSPADM

## 2023-02-14 RX ORDER — DROPERIDOL 2.5 MG/ML
0.62 INJECTION, SOLUTION INTRAMUSCULAR; INTRAVENOUS
Status: DISCONTINUED | OUTPATIENT
Start: 2023-02-14 | End: 2023-02-14 | Stop reason: HOSPADM

## 2023-02-14 RX ORDER — HYDROMORPHONE HYDROCHLORIDE 1 MG/ML
0.5 INJECTION, SOLUTION INTRAMUSCULAR; INTRAVENOUS; SUBCUTANEOUS
Status: ACTIVE | OUTPATIENT
Start: 2023-02-14 | End: 2023-02-21

## 2023-02-14 RX ORDER — DIPHENHYDRAMINE HCL 25 MG
25 CAPSULE ORAL EVERY 6 HOURS PRN
Status: DISCONTINUED | OUTPATIENT
Start: 2023-02-14 | End: 2023-02-15

## 2023-02-14 RX ORDER — ACETAMINOPHEN 160 MG/5ML
650 SOLUTION ORAL EVERY 4 HOURS PRN
Status: DISCONTINUED | OUTPATIENT
Start: 2023-02-14 | End: 2023-03-05 | Stop reason: HOSPADM

## 2023-02-14 RX ORDER — ONDANSETRON 4 MG/1
4 TABLET, FILM COATED ORAL EVERY 6 HOURS PRN
Status: DISCONTINUED | OUTPATIENT
Start: 2023-02-14 | End: 2023-03-05 | Stop reason: HOSPADM

## 2023-02-14 RX ORDER — FENTANYL CITRATE 50 UG/ML
INJECTION, SOLUTION INTRAMUSCULAR; INTRAVENOUS AS NEEDED
Status: DISCONTINUED | OUTPATIENT
Start: 2023-02-14 | End: 2023-02-14 | Stop reason: SURG

## 2023-02-14 RX ORDER — DIPHENHYDRAMINE HCL 25 MG
25 CAPSULE ORAL
Status: DISCONTINUED | OUTPATIENT
Start: 2023-02-14 | End: 2023-02-14 | Stop reason: HOSPADM

## 2023-02-14 RX ORDER — MORPHINE SULFATE 2 MG/ML
1 INJECTION, SOLUTION INTRAMUSCULAR; INTRAVENOUS EVERY 4 HOURS PRN
Status: DISCONTINUED | OUTPATIENT
Start: 2023-02-14 | End: 2023-02-21

## 2023-02-14 RX ORDER — LIDOCAINE HYDROCHLORIDE 20 MG/ML
INJECTION, SOLUTION INFILTRATION; PERINEURAL AS NEEDED
Status: DISCONTINUED | OUTPATIENT
Start: 2023-02-14 | End: 2023-02-14 | Stop reason: SURG

## 2023-02-14 RX ORDER — CITALOPRAM 20 MG/1
20 TABLET ORAL DAILY
Status: DISCONTINUED | OUTPATIENT
Start: 2023-02-14 | End: 2023-03-05 | Stop reason: HOSPADM

## 2023-02-14 RX ORDER — ONDANSETRON 2 MG/ML
4 INJECTION INTRAMUSCULAR; INTRAVENOUS EVERY 6 HOURS PRN
Status: DISCONTINUED | OUTPATIENT
Start: 2023-02-14 | End: 2023-03-05 | Stop reason: HOSPADM

## 2023-02-14 RX ORDER — ONDANSETRON 2 MG/ML
4 INJECTION INTRAMUSCULAR; INTRAVENOUS ONCE
Status: COMPLETED | OUTPATIENT
Start: 2023-02-14 | End: 2023-02-14

## 2023-02-14 RX ORDER — SODIUM CHLORIDE, SODIUM LACTATE, POTASSIUM CHLORIDE, CALCIUM CHLORIDE 600; 310; 30; 20 MG/100ML; MG/100ML; MG/100ML; MG/100ML
9 INJECTION, SOLUTION INTRAVENOUS CONTINUOUS
Status: DISCONTINUED | OUTPATIENT
Start: 2023-02-14 | End: 2023-02-14

## 2023-02-14 RX ORDER — SODIUM CHLORIDE 0.9 % (FLUSH) 0.9 %
10 SYRINGE (ML) INJECTION EVERY 12 HOURS SCHEDULED
Status: DISCONTINUED | OUTPATIENT
Start: 2023-02-14 | End: 2023-03-05 | Stop reason: HOSPADM

## 2023-02-14 RX ORDER — HYDROCODONE BITARTRATE AND ACETAMINOPHEN 10; 325 MG/1; MG/1
1 TABLET ORAL EVERY 4 HOURS PRN
Status: DISCONTINUED | OUTPATIENT
Start: 2023-02-14 | End: 2023-02-26

## 2023-02-14 RX ORDER — LIDOCAINE HYDROCHLORIDE 10 MG/ML
0.5 INJECTION, SOLUTION EPIDURAL; INFILTRATION; INTRACAUDAL; PERINEURAL ONCE AS NEEDED
Status: DISCONTINUED | OUTPATIENT
Start: 2023-02-14 | End: 2023-02-14 | Stop reason: HOSPADM

## 2023-02-14 RX ORDER — ACETAMINOPHEN 325 MG/1
650 TABLET ORAL EVERY 4 HOURS PRN
Status: DISCONTINUED | OUTPATIENT
Start: 2023-02-14 | End: 2023-03-05 | Stop reason: HOSPADM

## 2023-02-14 RX ORDER — SODIUM CHLORIDE, SODIUM LACTATE, POTASSIUM CHLORIDE, CALCIUM CHLORIDE 600; 310; 30; 20 MG/100ML; MG/100ML; MG/100ML; MG/100ML
INJECTION, SOLUTION INTRAVENOUS CONTINUOUS PRN
Status: DISCONTINUED | OUTPATIENT
Start: 2023-02-14 | End: 2023-02-14 | Stop reason: SURG

## 2023-02-14 RX ORDER — OXYCODONE AND ACETAMINOPHEN 7.5; 325 MG/1; MG/1
1 TABLET ORAL EVERY 4 HOURS PRN
Status: DISCONTINUED | OUTPATIENT
Start: 2023-02-14 | End: 2023-02-14 | Stop reason: HOSPADM

## 2023-02-14 RX ORDER — NALOXONE HCL 0.4 MG/ML
0.2 VIAL (ML) INJECTION AS NEEDED
Status: DISCONTINUED | OUTPATIENT
Start: 2023-02-14 | End: 2023-02-14 | Stop reason: HOSPADM

## 2023-02-14 RX ORDER — SODIUM CHLORIDE 0.9 % (FLUSH) 0.9 %
3-10 SYRINGE (ML) INJECTION AS NEEDED
Status: DISCONTINUED | OUTPATIENT
Start: 2023-02-14 | End: 2023-02-14 | Stop reason: HOSPADM

## 2023-02-14 RX ORDER — MORPHINE SULFATE 2 MG/ML
4 INJECTION, SOLUTION INTRAMUSCULAR; INTRAVENOUS ONCE
Status: COMPLETED | OUTPATIENT
Start: 2023-02-14 | End: 2023-02-14

## 2023-02-14 RX ORDER — LATANOPROST 50 UG/ML
1 SOLUTION/ DROPS OPHTHALMIC NIGHTLY
Status: DISCONTINUED | OUTPATIENT
Start: 2023-02-14 | End: 2023-03-05 | Stop reason: HOSPADM

## 2023-02-14 RX ORDER — PROMETHAZINE HYDROCHLORIDE 25 MG/1
25 TABLET ORAL ONCE AS NEEDED
Status: DISCONTINUED | OUTPATIENT
Start: 2023-02-14 | End: 2023-02-14 | Stop reason: HOSPADM

## 2023-02-14 RX ORDER — LABETALOL HYDROCHLORIDE 5 MG/ML
5 INJECTION, SOLUTION INTRAVENOUS
Status: DISCONTINUED | OUTPATIENT
Start: 2023-02-14 | End: 2023-02-14 | Stop reason: HOSPADM

## 2023-02-14 RX ORDER — SODIUM CHLORIDE 0.9 % (FLUSH) 0.9 %
3 SYRINGE (ML) INJECTION EVERY 12 HOURS SCHEDULED
Status: DISCONTINUED | OUTPATIENT
Start: 2023-02-14 | End: 2023-02-14 | Stop reason: HOSPADM

## 2023-02-14 RX ORDER — FENTANYL CITRATE 50 UG/ML
50 INJECTION, SOLUTION INTRAMUSCULAR; INTRAVENOUS
Status: DISCONTINUED | OUTPATIENT
Start: 2023-02-14 | End: 2023-02-14 | Stop reason: HOSPADM

## 2023-02-14 RX ORDER — NALOXONE HCL 0.4 MG/ML
0.4 VIAL (ML) INJECTION
Status: DISCONTINUED | OUTPATIENT
Start: 2023-02-14 | End: 2023-03-03

## 2023-02-14 RX ORDER — DEXTROSE, SODIUM CHLORIDE, AND POTASSIUM CHLORIDE 5; .45; .15 G/100ML; G/100ML; G/100ML
125 INJECTION INTRAVENOUS CONTINUOUS
Status: DISCONTINUED | OUTPATIENT
Start: 2023-02-14 | End: 2023-02-14

## 2023-02-14 RX ORDER — OXYCODONE HYDROCHLORIDE 5 MG/1
5 TABLET ORAL EVERY 4 HOURS PRN
Status: ACTIVE | OUTPATIENT
Start: 2023-02-14 | End: 2023-02-23

## 2023-02-14 RX ADMIN — FAMOTIDINE 20 MG: 10 INJECTION INTRAVENOUS at 18:58

## 2023-02-14 RX ADMIN — PRAVASTATIN SODIUM 40 MG: 40 TABLET ORAL at 21:23

## 2023-02-14 RX ADMIN — PROPOFOL 100 MG: 10 INJECTION, EMULSION INTRAVENOUS at 19:22

## 2023-02-14 RX ADMIN — FENTANYL CITRATE 25 MCG: 50 INJECTION, SOLUTION INTRAMUSCULAR; INTRAVENOUS at 19:35

## 2023-02-14 RX ADMIN — CEFTRIAXONE SODIUM 1 G: 1 INJECTION, POWDER, FOR SOLUTION INTRAMUSCULAR; INTRAVENOUS at 11:37

## 2023-02-14 RX ADMIN — MORPHINE SULFATE 1 MG: 2 INJECTION, SOLUTION INTRAMUSCULAR; INTRAVENOUS at 11:31

## 2023-02-14 RX ADMIN — POTASSIUM CHLORIDE, DEXTROSE MONOHYDRATE AND SODIUM CHLORIDE 100 ML/HR: 150; 5; 450 INJECTION, SOLUTION INTRAVENOUS at 14:38

## 2023-02-14 RX ADMIN — LIDOCAINE HYDROCHLORIDE 100 MG: 20 INJECTION, SOLUTION INFILTRATION; PERINEURAL at 19:22

## 2023-02-14 RX ADMIN — HYDROCODONE BITARTRATE AND ACETAMINOPHEN 1 TABLET: 5; 325 TABLET ORAL at 21:23

## 2023-02-14 RX ADMIN — MORPHINE SULFATE 4 MG: 2 INJECTION, SOLUTION INTRAMUSCULAR; INTRAVENOUS at 05:47

## 2023-02-14 RX ADMIN — SODIUM CHLORIDE, POTASSIUM CHLORIDE, SODIUM LACTATE AND CALCIUM CHLORIDE: 600; 310; 30; 20 INJECTION, SOLUTION INTRAVENOUS at 19:13

## 2023-02-14 RX ADMIN — ONDANSETRON 4 MG: 2 INJECTION INTRAMUSCULAR; INTRAVENOUS at 11:31

## 2023-02-14 RX ADMIN — SODIUM CHLORIDE 1000 ML: 9 INJECTION, SOLUTION INTRAVENOUS at 05:45

## 2023-02-14 RX ADMIN — ONDANSETRON 4 MG: 2 INJECTION INTRAMUSCULAR; INTRAVENOUS at 19:26

## 2023-02-14 RX ADMIN — FENTANYL CITRATE 25 MCG: 50 INJECTION, SOLUTION INTRAMUSCULAR; INTRAVENOUS at 19:39

## 2023-02-14 RX ADMIN — IOPAMIDOL 85 ML: 612 INJECTION, SOLUTION INTRAVENOUS at 05:26

## 2023-02-14 RX ADMIN — DEXAMETHASONE SODIUM PHOSPHATE 6 MG: 4 INJECTION, SOLUTION INTRAMUSCULAR; INTRAVENOUS at 19:26

## 2023-02-14 RX ADMIN — SODIUM CHLORIDE, POTASSIUM CHLORIDE, SODIUM LACTATE AND CALCIUM CHLORIDE 9 ML/HR: 600; 310; 30; 20 INJECTION, SOLUTION INTRAVENOUS at 18:53

## 2023-02-14 RX ADMIN — LATANOPROST 1 DROP: 50 SOLUTION OPHTHALMIC at 21:23

## 2023-02-14 RX ADMIN — ONDANSETRON 4 MG: 2 INJECTION INTRAMUSCULAR; INTRAVENOUS at 05:46

## 2023-02-14 NOTE — OUTREACH NOTE
Call Center TCM Note    Flowsheet Row Responses   [facility] facility patient discharged from? [place]   Does the patient have one of the following disease processes/diagnoses(primary or secondary)? General Surgery   TCM attempt successful? No   Unsuccessful attempts Attempt 3   Change in Health Status Readmitted          Xiao Mead RN    2/14/2023, 10:20 EST

## 2023-02-14 NOTE — TELEPHONE ENCOUNTER
Advised the patient she needs to call the surgeon. Patient is still under their care. Patient is currently in the ER.

## 2023-02-14 NOTE — ANESTHESIA PREPROCEDURE EVALUATION
Anesthesia Evaluation     Patient summary reviewed and Nursing notes reviewed   history of anesthetic complications: PONV  NPO Solid Status: > 8 hours  NPO Liquid Status: > 2 hours           Airway   Mallampati: II  TM distance: >3 FB  Neck ROM: full  no difficulty expected  Dental - normal exam     Pulmonary - negative pulmonary ROS and normal exam   (-) COPD, asthma, not a smoker, lung cancer  Cardiovascular - normal exam  Exercise tolerance: good (4-7 METS)    ECG reviewed  Rhythm: regular  Rate: normal    (+) hyperlipidemia,   (-) hypertension, valvular problems/murmurs, past MI, CAD, dysrhythmias, angina, CHF, cardiac stents, CABG, pericardial effusion    ROS comment: TTE 10/2021:  Estimated right ventricular systolic pressure from tricuspid regurgitation is normal (<35 mmHg).  Estimated left ventricular EF = 62% Left ventricular systolic function is normal.  Left ventricular diastolic function was normal.      Neuro/Psych  (+) psychiatric history Depression,    (-) seizures, TIA, CVA  GI/Hepatic/Renal/Endo    (+) obesity,   liver disease fatty liver disease, thyroid problem hypothyroidism and thyroid cancer  (-) hiatal hernia, GERD, PUD, hepatitis, no renal disease, diabetes, GI bleed    Musculoskeletal     Abdominal   (+) obese,     Abdomen: soft.   Substance History - negative use     OB/GYN          Other   arthritis,    history of cancer remission      Other Comment: Hx/o colon CA and thyroid CA                  Anesthesia Plan    ASA 3     general     intravenous induction     Anesthetic plan, risks, benefits, and alternatives have been provided, discussed and informed consent has been obtained with: patient.    Plan discussed with attending.        CODE STATUS:    Code Status (Patient has no pulse and is not breathing): CPR (Attempt to Resuscitate)  Medical Interventions (Patient has pulse or is breathing): Full Support

## 2023-02-15 LAB
ALBUMIN SERPL-MCNC: 3.2 G/DL (ref 3.5–5.2)
ALBUMIN/GLOB SERPL: 1.2 G/DL
ALP SERPL-CCNC: 60 U/L (ref 39–117)
ALT SERPL W P-5'-P-CCNC: 28 U/L (ref 1–33)
ANION GAP SERPL CALCULATED.3IONS-SCNC: 9 MMOL/L (ref 5–15)
AST SERPL-CCNC: 28 U/L (ref 1–32)
BASOPHILS # BLD AUTO: 0.01 10*3/MM3 (ref 0–0.2)
BASOPHILS NFR BLD AUTO: 0.2 % (ref 0–1.5)
BILIRUB SERPL-MCNC: 0.2 MG/DL (ref 0–1.2)
BUN SERPL-MCNC: 9 MG/DL (ref 8–23)
BUN/CREAT SERPL: 9.7 (ref 7–25)
CALCIUM SPEC-SCNC: 7.3 MG/DL (ref 8.6–10.5)
CHLORIDE SERPL-SCNC: 104 MMOL/L (ref 98–107)
CO2 SERPL-SCNC: 25 MMOL/L (ref 22–29)
CREAT SERPL-MCNC: 0.93 MG/DL (ref 0.57–1)
DEPRECATED RDW RBC AUTO: 48.6 FL (ref 37–54)
EGFRCR SERPLBLD CKD-EPI 2021: 64.6 ML/MIN/1.73
EOSINOPHIL # BLD AUTO: 0.01 10*3/MM3 (ref 0–0.4)
EOSINOPHIL NFR BLD AUTO: 0.2 % (ref 0.3–6.2)
ERYTHROCYTE [DISTWIDTH] IN BLOOD BY AUTOMATED COUNT: 17.3 % (ref 12.3–15.4)
GLOBULIN UR ELPH-MCNC: 2.6 GM/DL
GLUCOSE SERPL-MCNC: 164 MG/DL (ref 65–99)
HCT VFR BLD AUTO: 31.8 % (ref 34–46.6)
HGB BLD-MCNC: 9.6 G/DL (ref 12–15.9)
IMM GRANULOCYTES # BLD AUTO: 0.03 10*3/MM3 (ref 0–0.05)
IMM GRANULOCYTES NFR BLD AUTO: 0.6 % (ref 0–0.5)
LAB AP CASE REPORT: NORMAL
LAB AP DIAGNOSIS COMMENT: NORMAL
LAB AP SYNOPTIC CHECKLIST: NORMAL
LYMPHOCYTES # BLD AUTO: 0.45 10*3/MM3 (ref 0.7–3.1)
LYMPHOCYTES NFR BLD AUTO: 9.4 % (ref 19.6–45.3)
Lab: NORMAL
MCH RBC QN AUTO: 23.8 PG (ref 26.6–33)
MCHC RBC AUTO-ENTMCNC: 30.2 G/DL (ref 31.5–35.7)
MCV RBC AUTO: 78.7 FL (ref 79–97)
MONOCYTES # BLD AUTO: 0.66 10*3/MM3 (ref 0.1–0.9)
MONOCYTES NFR BLD AUTO: 13.8 % (ref 5–12)
NEUTROPHILS NFR BLD AUTO: 3.62 10*3/MM3 (ref 1.7–7)
NEUTROPHILS NFR BLD AUTO: 75.8 % (ref 42.7–76)
NRBC BLD AUTO-RTO: 0 /100 WBC (ref 0–0.2)
PATH REPORT.ADDENDUM SPEC: NORMAL
PATH REPORT.FINAL DX SPEC: NORMAL
PATH REPORT.GROSS SPEC: NORMAL
PLATELET # BLD AUTO: 348 10*3/MM3 (ref 140–450)
PMV BLD AUTO: 10.1 FL (ref 6–12)
POTASSIUM SERPL-SCNC: 4.5 MMOL/L (ref 3.5–5.2)
PROT SERPL-MCNC: 5.8 G/DL (ref 6–8.5)
RBC # BLD AUTO: 4.04 10*6/MM3 (ref 3.77–5.28)
SODIUM SERPL-SCNC: 138 MMOL/L (ref 136–145)
WBC NRBC COR # BLD: 4.78 10*3/MM3 (ref 3.4–10.8)

## 2023-02-15 PROCEDURE — G0378 HOSPITAL OBSERVATION PER HR: HCPCS

## 2023-02-15 PROCEDURE — 63710000001 DIPHENHYDRAMINE PER 50 MG: Performed by: SURGERY

## 2023-02-15 PROCEDURE — 85025 COMPLETE CBC W/AUTO DIFF WBC: CPT | Performed by: UROLOGY

## 2023-02-15 PROCEDURE — 80053 COMPREHEN METABOLIC PANEL: CPT | Performed by: UROLOGY

## 2023-02-15 PROCEDURE — 36415 COLL VENOUS BLD VENIPUNCTURE: CPT | Performed by: UROLOGY

## 2023-02-15 PROCEDURE — 63710000001 ONDANSETRON PER 8 MG: Performed by: UROLOGY

## 2023-02-15 PROCEDURE — 25010000002 CEFTRIAXONE PER 250 MG: Performed by: UROLOGY

## 2023-02-15 RX ORDER — DIPHENHYDRAMINE HCL 25 MG
25 CAPSULE ORAL EVERY 6 HOURS
Status: DISCONTINUED | OUTPATIENT
Start: 2023-02-15 | End: 2023-02-28

## 2023-02-15 RX ORDER — CEFDINIR 300 MG/1
300 CAPSULE ORAL 2 TIMES DAILY
Qty: 10 CAPSULE | Refills: 0 | Status: SHIPPED | OUTPATIENT
Start: 2023-02-15 | End: 2023-02-22

## 2023-02-15 RX ADMIN — CEFTRIAXONE SODIUM 1 G: 1 INJECTION, POWDER, FOR SOLUTION INTRAMUSCULAR; INTRAVENOUS at 10:13

## 2023-02-15 RX ADMIN — LATANOPROST 1 DROP: 50 SOLUTION OPHTHALMIC at 21:34

## 2023-02-15 RX ADMIN — CITALOPRAM 20 MG: 20 TABLET, FILM COATED ORAL at 09:00

## 2023-02-15 RX ADMIN — DIPHENHYDRAMINE HYDROCHLORIDE 25 MG: 25 CAPSULE ORAL at 23:25

## 2023-02-15 RX ADMIN — PRAVASTATIN SODIUM 40 MG: 40 TABLET ORAL at 21:34

## 2023-02-15 RX ADMIN — DIPHENHYDRAMINE HYDROCHLORIDE 25 MG: 25 CAPSULE ORAL at 16:59

## 2023-02-15 RX ADMIN — MAGNESIUM OXIDE 400 MG (241.3 MG MAGNESIUM) TABLET 400 MG: TABLET at 09:00

## 2023-02-15 RX ADMIN — HYDROCODONE BITARTRATE AND ACETAMINOPHEN 1 TABLET: 5; 325 TABLET ORAL at 06:30

## 2023-02-15 RX ADMIN — ONDANSETRON HYDROCHLORIDE 4 MG: 4 TABLET, FILM COATED ORAL at 15:40

## 2023-02-15 RX ADMIN — LEVOTHYROXINE SODIUM 125 MCG: 0.12 TABLET ORAL at 06:30

## 2023-02-15 RX ADMIN — Medication 10 ML: at 09:01

## 2023-02-15 RX ADMIN — POTASSIUM CHLORIDE, DEXTROSE MONOHYDRATE AND SODIUM CHLORIDE 100 ML/HR: 150; 5; 450 INJECTION, SOLUTION INTRAVENOUS at 03:57

## 2023-02-15 RX ADMIN — CALCITRIOL 0.25 MCG: 0.25 CAPSULE ORAL at 09:00

## 2023-02-15 RX ADMIN — CALCIUM CARBONATE-VITAMIN D TAB 500 MG-200 UNIT 1 TABLET: 500-200 TAB at 09:00

## 2023-02-15 RX ADMIN — Medication 10 ML: at 21:34

## 2023-02-15 RX ADMIN — DIPHENHYDRAMINE HYDROCHLORIDE 25 MG: 25 CAPSULE ORAL at 11:17

## 2023-02-15 NOTE — ANESTHESIA PROCEDURE NOTES
Airway  Urgency: elective    Date/Time: 2/14/2023 7:24 PM    General Information and Staff    Patient location during procedure: OR  Anesthesiologist: Primo Kim MD    Indications and Patient Condition  Indications for airway management: airway protection    Preoxygenated: yes  Mask difficulty assessment: 1 - vent by mask    Final Airway Details  Final airway type: supraglottic airway      Successful airway: unique  Size 4     Number of attempts at approach: 1  Assessment: lips, teeth, and gum same as pre-op and atraumatic intubation

## 2023-02-15 NOTE — ANESTHESIA POSTPROCEDURE EVALUATION
"Patient: Stefani Watson    Procedure Summary     Date: 02/14/23 Room / Location: [facility] OR 01 / [facility]IN OR    Anesthesia Start: 1913 Anesthesia Stop: 2000    Procedure: CYSTOSCOPY URETERAL CATHETER/STENT INSERTION, Retrograde Pyelogram (Right) Diagnosis:       Hydronephrosis of right kidney      (Right hydronephrosis)    Surgeons: Paul Antonio MD Provider: Primo Kim MD    Anesthesia Type: general ASA Status: 3          Anesthesia Type: general    Vitals  Vitals Value Taken Time   /79 02/14/23 2031   Temp 37.1 °C (98.7 °F) 02/14/23 1955   Pulse 78 02/14/23 2039   Resp 16 02/14/23 2030   SpO2 95 % 02/14/23 2040   Vitals shown include unvalidated device data.        Post Anesthesia Care and Evaluation    Patient location during evaluation: bedside  Patient participation: complete - patient participated  Level of consciousness: awake and alert  Pain score: 0  Pain management: adequate    Airway patency: patent  Anesthetic complications: No anesthetic complications    Cardiovascular status: acceptable  Respiratory status: acceptable  Hydration status: acceptable    Comments: /86 (BP Location: Left arm, Patient Position: Lying)   Pulse 78   Temp 36.8 °C (98.3 °F) (Oral)   Resp 16   Ht 165.1 cm (65\")   Wt 93 kg (205 lb)   LMP  (LMP Unknown)   SpO2 96%   BMI 34.11 kg/m²       "

## 2023-02-16 ENCOUNTER — APPOINTMENT (OUTPATIENT)
Dept: GENERAL RADIOLOGY | Facility: HOSPITAL | Age: 73
DRG: 330 | End: 2023-02-16
Payer: MEDICARE

## 2023-02-16 PROCEDURE — 74018 RADEX ABDOMEN 1 VIEW: CPT

## 2023-02-16 PROCEDURE — G0378 HOSPITAL OBSERVATION PER HR: HCPCS

## 2023-02-16 PROCEDURE — 63710000001 DIPHENHYDRAMINE PER 50 MG: Performed by: SURGERY

## 2023-02-16 RX ORDER — SIMETHICONE 80 MG
80 TABLET,CHEWABLE ORAL 4 TIMES DAILY PRN
Status: DISCONTINUED | OUTPATIENT
Start: 2023-02-16 | End: 2023-03-05 | Stop reason: HOSPADM

## 2023-02-16 RX ADMIN — CALCIUM CARBONATE-VITAMIN D TAB 500 MG-200 UNIT 1 TABLET: 500-200 TAB at 09:13

## 2023-02-16 RX ADMIN — Medication 10 ML: at 09:13

## 2023-02-16 RX ADMIN — DIPHENHYDRAMINE HYDROCHLORIDE 25 MG: 25 CAPSULE ORAL at 06:20

## 2023-02-16 RX ADMIN — CALCITRIOL 0.25 MCG: 0.25 CAPSULE ORAL at 09:13

## 2023-02-16 RX ADMIN — LEVOTHYROXINE SODIUM 125 MCG: 0.12 TABLET ORAL at 06:20

## 2023-02-16 RX ADMIN — DIPHENHYDRAMINE HYDROCHLORIDE 25 MG: 25 CAPSULE ORAL at 12:55

## 2023-02-16 RX ADMIN — MAGNESIUM OXIDE 400 MG (241.3 MG MAGNESIUM) TABLET 400 MG: TABLET at 09:13

## 2023-02-16 RX ADMIN — DIPHENHYDRAMINE HYDROCHLORIDE 25 MG: 25 CAPSULE ORAL at 23:27

## 2023-02-16 RX ADMIN — CITALOPRAM 20 MG: 20 TABLET, FILM COATED ORAL at 09:13

## 2023-02-16 RX ADMIN — Medication 10 ML: at 20:06

## 2023-02-16 RX ADMIN — PRAVASTATIN SODIUM 40 MG: 40 TABLET ORAL at 20:05

## 2023-02-16 RX ADMIN — DIPHENHYDRAMINE HYDROCHLORIDE 25 MG: 25 CAPSULE ORAL at 18:45

## 2023-02-16 RX ADMIN — LATANOPROST 1 DROP: 50 SOLUTION OPHTHALMIC at 20:06

## 2023-02-17 ENCOUNTER — APPOINTMENT (OUTPATIENT)
Dept: GENERAL RADIOLOGY | Facility: HOSPITAL | Age: 73
DRG: 330 | End: 2023-02-17
Payer: MEDICARE

## 2023-02-17 PROCEDURE — G0378 HOSPITAL OBSERVATION PER HR: HCPCS

## 2023-02-17 PROCEDURE — 63710000001 DIPHENHYDRAMINE PER 50 MG: Performed by: SURGERY

## 2023-02-17 PROCEDURE — 74018 RADEX ABDOMEN 1 VIEW: CPT

## 2023-02-17 RX ORDER — CEFDINIR 300 MG/1
300 CAPSULE ORAL EVERY 12 HOURS SCHEDULED
Status: DISCONTINUED | OUTPATIENT
Start: 2023-02-17 | End: 2023-02-22

## 2023-02-17 RX ADMIN — DIPHENHYDRAMINE HYDROCHLORIDE 25 MG: 25 CAPSULE ORAL at 12:37

## 2023-02-17 RX ADMIN — CEFDINIR 300 MG: 300 CAPSULE ORAL at 21:46

## 2023-02-17 RX ADMIN — LATANOPROST 1 DROP: 50 SOLUTION OPHTHALMIC at 21:46

## 2023-02-17 RX ADMIN — CITALOPRAM 20 MG: 20 TABLET, FILM COATED ORAL at 08:13

## 2023-02-17 RX ADMIN — LEVOTHYROXINE SODIUM 125 MCG: 0.12 TABLET ORAL at 06:49

## 2023-02-17 RX ADMIN — DIPHENHYDRAMINE HYDROCHLORIDE 25 MG: 25 CAPSULE ORAL at 06:48

## 2023-02-17 RX ADMIN — CALCIUM CARBONATE-VITAMIN D TAB 500 MG-200 UNIT 1 TABLET: 500-200 TAB at 08:13

## 2023-02-17 RX ADMIN — SIMETHICONE 80 MG: 80 TABLET, CHEWABLE ORAL at 08:14

## 2023-02-17 RX ADMIN — Medication 10 ML: at 21:47

## 2023-02-17 RX ADMIN — CALCITRIOL 0.25 MCG: 0.25 CAPSULE ORAL at 08:13

## 2023-02-17 RX ADMIN — PRAVASTATIN SODIUM 40 MG: 40 TABLET ORAL at 21:46

## 2023-02-17 RX ADMIN — Medication 10 ML: at 08:13

## 2023-02-17 RX ADMIN — CEFDINIR 300 MG: 300 CAPSULE ORAL at 12:37

## 2023-02-17 RX ADMIN — MAGNESIUM OXIDE 400 MG (241.3 MG MAGNESIUM) TABLET 400 MG: TABLET at 08:13

## 2023-02-17 RX ADMIN — DIPHENHYDRAMINE HYDROCHLORIDE 25 MG: 25 CAPSULE ORAL at 17:14

## 2023-02-18 ENCOUNTER — APPOINTMENT (OUTPATIENT)
Dept: CT IMAGING | Facility: HOSPITAL | Age: 73
DRG: 330 | End: 2023-02-18
Payer: MEDICARE

## 2023-02-18 PROCEDURE — G0378 HOSPITAL OBSERVATION PER HR: HCPCS

## 2023-02-18 PROCEDURE — 74176 CT ABD & PELVIS W/O CONTRAST: CPT

## 2023-02-18 PROCEDURE — 63710000001 DIPHENHYDRAMINE PER 50 MG: Performed by: SURGERY

## 2023-02-18 PROCEDURE — 99024 POSTOP FOLLOW-UP VISIT: CPT | Performed by: SURGERY

## 2023-02-18 RX ADMIN — Medication 10 ML: at 08:39

## 2023-02-18 RX ADMIN — CALCITRIOL 0.25 MCG: 0.25 CAPSULE ORAL at 08:39

## 2023-02-18 RX ADMIN — LEVOTHYROXINE SODIUM 125 MCG: 0.12 TABLET ORAL at 07:00

## 2023-02-18 RX ADMIN — SIMETHICONE 80 MG: 80 TABLET, CHEWABLE ORAL at 08:48

## 2023-02-18 RX ADMIN — LATANOPROST 1 DROP: 50 SOLUTION OPHTHALMIC at 21:38

## 2023-02-18 RX ADMIN — MAGNESIUM OXIDE 400 MG (241.3 MG MAGNESIUM) TABLET 400 MG: TABLET at 08:39

## 2023-02-18 RX ADMIN — CITALOPRAM 20 MG: 20 TABLET, FILM COATED ORAL at 08:39

## 2023-02-18 RX ADMIN — DIPHENHYDRAMINE HYDROCHLORIDE 25 MG: 25 CAPSULE ORAL at 05:28

## 2023-02-18 RX ADMIN — PRAVASTATIN SODIUM 40 MG: 40 TABLET ORAL at 21:38

## 2023-02-18 RX ADMIN — CALCIUM CARBONATE-VITAMIN D TAB 500 MG-200 UNIT 1 TABLET: 500-200 TAB at 08:38

## 2023-02-18 RX ADMIN — CEFDINIR 300 MG: 300 CAPSULE ORAL at 21:38

## 2023-02-18 RX ADMIN — CEFDINIR 300 MG: 300 CAPSULE ORAL at 08:38

## 2023-02-18 RX ADMIN — Medication 10 ML: at 21:39

## 2023-02-19 LAB
ANION GAP SERPL CALCULATED.3IONS-SCNC: 5.6 MMOL/L (ref 5–15)
BACTERIA SPEC AEROBE CULT: NORMAL
BACTERIA SPEC AEROBE CULT: NORMAL
BUN SERPL-MCNC: 8 MG/DL (ref 8–23)
BUN/CREAT SERPL: 9.1 (ref 7–25)
CALCIUM SPEC-SCNC: 8.1 MG/DL (ref 8.6–10.5)
CHLORIDE SERPL-SCNC: 105 MMOL/L (ref 98–107)
CO2 SERPL-SCNC: 26.4 MMOL/L (ref 22–29)
CREAT SERPL-MCNC: 0.88 MG/DL (ref 0.57–1)
DEPRECATED RDW RBC AUTO: 47.4 FL (ref 37–54)
EGFRCR SERPLBLD CKD-EPI 2021: 69.1 ML/MIN/1.73
ERYTHROCYTE [DISTWIDTH] IN BLOOD BY AUTOMATED COUNT: 17.2 % (ref 12.3–15.4)
GLUCOSE SERPL-MCNC: 98 MG/DL (ref 65–99)
HCT VFR BLD AUTO: 33.7 % (ref 34–46.6)
HGB BLD-MCNC: 10.7 G/DL (ref 12–15.9)
MCH RBC QN AUTO: 24.6 PG (ref 26.6–33)
MCHC RBC AUTO-ENTMCNC: 31.8 G/DL (ref 31.5–35.7)
MCV RBC AUTO: 77.5 FL (ref 79–97)
PLATELET # BLD AUTO: 438 10*3/MM3 (ref 140–450)
PMV BLD AUTO: 9.2 FL (ref 6–12)
POTASSIUM SERPL-SCNC: 4.3 MMOL/L (ref 3.5–5.2)
RBC # BLD AUTO: 4.35 10*6/MM3 (ref 3.77–5.28)
SODIUM SERPL-SCNC: 137 MMOL/L (ref 136–145)
WBC NRBC COR # BLD: 8.16 10*3/MM3 (ref 3.4–10.8)

## 2023-02-19 PROCEDURE — 25010000002 MORPHINE PER 10 MG: Performed by: UROLOGY

## 2023-02-19 PROCEDURE — 85027 COMPLETE CBC AUTOMATED: CPT | Performed by: SURGERY

## 2023-02-19 PROCEDURE — G0378 HOSPITAL OBSERVATION PER HR: HCPCS

## 2023-02-19 PROCEDURE — 99024 POSTOP FOLLOW-UP VISIT: CPT | Performed by: SURGERY

## 2023-02-19 PROCEDURE — 80048 BASIC METABOLIC PNL TOTAL CA: CPT | Performed by: SURGERY

## 2023-02-19 PROCEDURE — 25010000002 ONDANSETRON PER 1 MG: Performed by: UROLOGY

## 2023-02-19 RX ORDER — DEXTROSE, SODIUM CHLORIDE, AND POTASSIUM CHLORIDE 5; .45; .15 G/100ML; G/100ML; G/100ML
100 INJECTION INTRAVENOUS CONTINUOUS
Status: DISCONTINUED | OUTPATIENT
Start: 2023-02-19 | End: 2023-02-23

## 2023-02-19 RX ADMIN — POTASSIUM CHLORIDE, DEXTROSE MONOHYDRATE AND SODIUM CHLORIDE 100 ML/HR: 150; 5; 450 INJECTION, SOLUTION INTRAVENOUS at 22:52

## 2023-02-19 RX ADMIN — CALCITRIOL 0.25 MCG: 0.25 CAPSULE ORAL at 08:51

## 2023-02-19 RX ADMIN — POTASSIUM CHLORIDE, DEXTROSE MONOHYDRATE AND SODIUM CHLORIDE 100 ML/HR: 150; 5; 450 INJECTION, SOLUTION INTRAVENOUS at 13:16

## 2023-02-19 RX ADMIN — PRAVASTATIN SODIUM 40 MG: 40 TABLET ORAL at 20:04

## 2023-02-19 RX ADMIN — ONDANSETRON 4 MG: 2 INJECTION INTRAMUSCULAR; INTRAVENOUS at 00:13

## 2023-02-19 RX ADMIN — CALCIUM CARBONATE-VITAMIN D TAB 500 MG-200 UNIT 1 TABLET: 500-200 TAB at 08:51

## 2023-02-19 RX ADMIN — MORPHINE SULFATE 1 MG: 2 INJECTION, SOLUTION INTRAMUSCULAR; INTRAVENOUS at 00:13

## 2023-02-19 RX ADMIN — LATANOPROST 1 DROP: 50 SOLUTION OPHTHALMIC at 20:05

## 2023-02-19 RX ADMIN — MAGNESIUM OXIDE 400 MG (241.3 MG MAGNESIUM) TABLET 400 MG: TABLET at 08:51

## 2023-02-19 RX ADMIN — Medication 10 ML: at 08:51

## 2023-02-19 RX ADMIN — CEFDINIR 300 MG: 300 CAPSULE ORAL at 20:04

## 2023-02-19 RX ADMIN — CITALOPRAM 20 MG: 20 TABLET, FILM COATED ORAL at 08:51

## 2023-02-19 RX ADMIN — Medication 10 ML: at 20:05

## 2023-02-19 RX ADMIN — LEVOTHYROXINE SODIUM 125 MCG: 0.12 TABLET ORAL at 05:02

## 2023-02-19 RX ADMIN — CEFDINIR 300 MG: 300 CAPSULE ORAL at 08:51

## 2023-02-20 ENCOUNTER — APPOINTMENT (OUTPATIENT)
Dept: GENERAL RADIOLOGY | Facility: HOSPITAL | Age: 73
DRG: 330 | End: 2023-02-20
Payer: MEDICARE

## 2023-02-20 PROBLEM — K56.609 SBO (SMALL BOWEL OBSTRUCTION): Status: ACTIVE | Noted: 2023-02-13

## 2023-02-20 PROCEDURE — 74250 X-RAY XM SM INT 1CNTRST STD: CPT

## 2023-02-20 RX ADMIN — PRAVASTATIN SODIUM 40 MG: 40 TABLET ORAL at 20:54

## 2023-02-20 RX ADMIN — LATANOPROST 1 DROP: 50 SOLUTION OPHTHALMIC at 20:54

## 2023-02-20 RX ADMIN — CEFDINIR 300 MG: 300 CAPSULE ORAL at 20:54

## 2023-02-20 RX ADMIN — POTASSIUM CHLORIDE, DEXTROSE MONOHYDRATE AND SODIUM CHLORIDE 100 ML/HR: 150; 5; 450 INJECTION, SOLUTION INTRAVENOUS at 23:13

## 2023-02-20 RX ADMIN — POTASSIUM CHLORIDE, DEXTROSE MONOHYDRATE AND SODIUM CHLORIDE 100 ML/HR: 150; 5; 450 INJECTION, SOLUTION INTRAVENOUS at 08:34

## 2023-02-20 RX ADMIN — LEVOTHYROXINE SODIUM 125 MCG: 0.12 TABLET ORAL at 06:41

## 2023-02-21 ENCOUNTER — APPOINTMENT (OUTPATIENT)
Dept: GENERAL RADIOLOGY | Facility: HOSPITAL | Age: 73
DRG: 330 | End: 2023-02-21
Payer: MEDICARE

## 2023-02-21 ENCOUNTER — ANESTHESIA (OUTPATIENT)
Dept: PERIOP | Facility: HOSPITAL | Age: 73
End: 2023-02-21

## 2023-02-21 ENCOUNTER — ANESTHESIA EVENT (OUTPATIENT)
Dept: PERIOP | Facility: HOSPITAL | Age: 73
End: 2023-02-21

## 2023-02-21 PROCEDURE — 74018 RADEX ABDOMEN 1 VIEW: CPT

## 2023-02-21 PROCEDURE — 25010000002 ONDANSETRON PER 1 MG: Performed by: SURGERY

## 2023-02-21 PROCEDURE — 25010000002 HYDROMORPHONE PER 4 MG: Performed by: SURGERY

## 2023-02-21 PROCEDURE — G0463 HOSPITAL OUTPT CLINIC VISIT: HCPCS | Performed by: SURGERY

## 2023-02-21 RX ORDER — METRONIDAZOLE 500 MG/100ML
500 INJECTION, SOLUTION INTRAVENOUS ONCE
Status: DISCONTINUED | OUTPATIENT
Start: 2023-02-21 | End: 2023-02-21 | Stop reason: HOSPADM

## 2023-02-21 RX ORDER — CEFAZOLIN SODIUM 2 G/100ML
2 INJECTION, SOLUTION INTRAVENOUS ONCE
Status: DISCONTINUED | OUTPATIENT
Start: 2023-02-21 | End: 2023-02-21 | Stop reason: HOSPADM

## 2023-02-21 RX ORDER — HYDROMORPHONE HYDROCHLORIDE 1 MG/ML
0.5 INJECTION, SOLUTION INTRAMUSCULAR; INTRAVENOUS; SUBCUTANEOUS
Status: DISCONTINUED | OUTPATIENT
Start: 2023-02-21 | End: 2023-02-22 | Stop reason: SDUPTHER

## 2023-02-21 RX ADMIN — HYDROCODONE BITARTRATE AND ACETAMINOPHEN 1 TABLET: 5; 325 TABLET ORAL at 11:53

## 2023-02-21 RX ADMIN — POTASSIUM CHLORIDE, DEXTROSE MONOHYDRATE AND SODIUM CHLORIDE 100 ML/HR: 150; 5; 450 INJECTION, SOLUTION INTRAVENOUS at 21:09

## 2023-02-21 RX ADMIN — CEFDINIR 300 MG: 300 CAPSULE ORAL at 11:51

## 2023-02-21 RX ADMIN — Medication 10 ML: at 21:10

## 2023-02-21 RX ADMIN — HYDROCODONE BITARTRATE AND ACETAMINOPHEN 1 TABLET: 10; 325 TABLET ORAL at 07:12

## 2023-02-21 RX ADMIN — LEVOTHYROXINE SODIUM 125 MCG: 0.12 TABLET ORAL at 05:24

## 2023-02-21 RX ADMIN — POTASSIUM CHLORIDE, DEXTROSE MONOHYDRATE AND SODIUM CHLORIDE 100 ML/HR: 150; 5; 450 INJECTION, SOLUTION INTRAVENOUS at 11:51

## 2023-02-21 RX ADMIN — LATANOPROST 1 DROP: 50 SOLUTION OPHTHALMIC at 21:10

## 2023-02-21 RX ADMIN — SIMETHICONE 80 MG: 80 TABLET, CHEWABLE ORAL at 20:32

## 2023-02-21 RX ADMIN — CALCIUM CARBONATE-VITAMIN D TAB 500 MG-200 UNIT 1 TABLET: 500-200 TAB at 11:51

## 2023-02-21 RX ADMIN — HYDROMORPHONE HYDROCHLORIDE 0.5 MG: 1 INJECTION, SOLUTION INTRAMUSCULAR; INTRAVENOUS; SUBCUTANEOUS at 21:09

## 2023-02-21 RX ADMIN — CITALOPRAM 20 MG: 20 TABLET, FILM COATED ORAL at 11:52

## 2023-02-21 RX ADMIN — HYDROCODONE BITARTRATE AND ACETAMINOPHEN 1 TABLET: 10; 325 TABLET ORAL at 20:18

## 2023-02-21 RX ADMIN — CALCITRIOL 0.25 MCG: 0.25 CAPSULE ORAL at 11:51

## 2023-02-21 RX ADMIN — ONDANSETRON 4 MG: 2 INJECTION INTRAMUSCULAR; INTRAVENOUS at 20:32

## 2023-02-21 RX ADMIN — MAGNESIUM OXIDE 400 MG (241.3 MG MAGNESIUM) TABLET 400 MG: TABLET at 11:51

## 2023-02-21 RX ADMIN — HYDROCODONE BITARTRATE AND ACETAMINOPHEN 1 TABLET: 5; 325 TABLET ORAL at 15:58

## 2023-02-21 RX ADMIN — Medication 10 ML: at 08:07

## 2023-02-21 RX ADMIN — SIMETHICONE 80 MG: 80 TABLET, CHEWABLE ORAL at 16:44

## 2023-02-22 ENCOUNTER — APPOINTMENT (OUTPATIENT)
Dept: GENERAL RADIOLOGY | Facility: HOSPITAL | Age: 73
DRG: 330 | End: 2023-02-22
Payer: MEDICARE

## 2023-02-22 ENCOUNTER — ANESTHESIA EVENT (OUTPATIENT)
Dept: PERIOP | Facility: HOSPITAL | Age: 73
DRG: 330 | End: 2023-02-22
Payer: MEDICARE

## 2023-02-22 ENCOUNTER — ANESTHESIA (OUTPATIENT)
Dept: PERIOP | Facility: HOSPITAL | Age: 73
DRG: 330 | End: 2023-02-22
Payer: MEDICARE

## 2023-02-22 ENCOUNTER — PREP FOR SURGERY (OUTPATIENT)
Dept: OTHER | Facility: HOSPITAL | Age: 73
End: 2023-02-22
Payer: MEDICARE

## 2023-02-22 DIAGNOSIS — K56.609 SBO (SMALL BOWEL OBSTRUCTION): Primary | ICD-10-CM

## 2023-02-22 LAB
ALBUMIN SERPL-MCNC: 3.6 G/DL (ref 3.5–5.2)
ALBUMIN/GLOB SERPL: 1.2 G/DL
ALP SERPL-CCNC: 67 U/L (ref 39–117)
ALT SERPL W P-5'-P-CCNC: 11 U/L (ref 1–33)
ANION GAP SERPL CALCULATED.3IONS-SCNC: 7.5 MMOL/L (ref 5–15)
AST SERPL-CCNC: 13 U/L (ref 1–32)
BASOPHILS # BLD AUTO: 0.09 10*3/MM3 (ref 0–0.2)
BASOPHILS NFR BLD AUTO: 0.6 % (ref 0–1.5)
BILIRUB SERPL-MCNC: <0.2 MG/DL (ref 0–1.2)
BUN SERPL-MCNC: 5 MG/DL (ref 8–23)
BUN/CREAT SERPL: 4.8 (ref 7–25)
CALCIUM SPEC-SCNC: 7.6 MG/DL (ref 8.6–10.5)
CHLORIDE SERPL-SCNC: 106 MMOL/L (ref 98–107)
CO2 SERPL-SCNC: 21.5 MMOL/L (ref 22–29)
CREAT SERPL-MCNC: 1.04 MG/DL (ref 0.57–1)
DEPRECATED RDW RBC AUTO: 49.9 FL (ref 37–54)
EGFRCR SERPLBLD CKD-EPI 2021: 56.5 ML/MIN/1.73
EOSINOPHIL # BLD AUTO: 0.21 10*3/MM3 (ref 0–0.4)
EOSINOPHIL NFR BLD AUTO: 1.5 % (ref 0.3–6.2)
ERYTHROCYTE [DISTWIDTH] IN BLOOD BY AUTOMATED COUNT: 17.6 % (ref 12.3–15.4)
GLOBULIN UR ELPH-MCNC: 2.9 GM/DL
GLUCOSE BLDC GLUCOMTR-MCNC: 120 MG/DL (ref 70–130)
GLUCOSE SERPL-MCNC: 119 MG/DL (ref 65–99)
HCT VFR BLD AUTO: 37.8 % (ref 34–46.6)
HGB BLD-MCNC: 11.2 G/DL (ref 12–15.9)
IMM GRANULOCYTES # BLD AUTO: 0.13 10*3/MM3 (ref 0–0.05)
IMM GRANULOCYTES NFR BLD AUTO: 0.9 % (ref 0–0.5)
LYMPHOCYTES # BLD AUTO: 1.63 10*3/MM3 (ref 0.7–3.1)
LYMPHOCYTES NFR BLD AUTO: 11.4 % (ref 19.6–45.3)
MCH RBC QN AUTO: 23.7 PG (ref 26.6–33)
MCHC RBC AUTO-ENTMCNC: 29.6 G/DL (ref 31.5–35.7)
MCV RBC AUTO: 79.9 FL (ref 79–97)
MONOCYTES # BLD AUTO: 1.42 10*3/MM3 (ref 0.1–0.9)
MONOCYTES NFR BLD AUTO: 10 % (ref 5–12)
NEUTROPHILS NFR BLD AUTO: 10.76 10*3/MM3 (ref 1.7–7)
NEUTROPHILS NFR BLD AUTO: 75.6 % (ref 42.7–76)
NRBC BLD AUTO-RTO: 0 /100 WBC (ref 0–0.2)
PLATELET # BLD AUTO: 523 10*3/MM3 (ref 140–450)
PMV BLD AUTO: 9.6 FL (ref 6–12)
POTASSIUM SERPL-SCNC: 4.2 MMOL/L (ref 3.5–5.2)
PROT SERPL-MCNC: 6.5 G/DL (ref 6–8.5)
RBC # BLD AUTO: 4.73 10*6/MM3 (ref 3.77–5.28)
SODIUM SERPL-SCNC: 135 MMOL/L (ref 136–145)
WBC NRBC COR # BLD: 14.24 10*3/MM3 (ref 3.4–10.8)

## 2023-02-22 PROCEDURE — 87205 SMEAR GRAM STAIN: CPT | Performed by: SURGERY

## 2023-02-22 PROCEDURE — 87040 BLOOD CULTURE FOR BACTERIA: CPT | Performed by: SURGERY

## 2023-02-22 PROCEDURE — 82962 GLUCOSE BLOOD TEST: CPT

## 2023-02-22 PROCEDURE — 44140 PARTIAL REMOVAL OF COLON: CPT | Performed by: REGISTERED NURSE

## 2023-02-22 PROCEDURE — 44140 PARTIAL REMOVAL OF COLON: CPT | Performed by: SURGERY

## 2023-02-22 PROCEDURE — 25010000002 CEFAZOLIN IN DEXTROSE 2-4 GM/100ML-% SOLUTION: Performed by: PHYSICIAN ASSISTANT

## 2023-02-22 PROCEDURE — 25010000002 ONDANSETRON PER 1 MG: Performed by: ANESTHESIOLOGY

## 2023-02-22 PROCEDURE — 0 BUPIVACAINE LIPOSOME 1.3 % SUSPENSION 20 ML VIAL: Performed by: SURGERY

## 2023-02-22 PROCEDURE — 74018 RADEX ABDOMEN 1 VIEW: CPT

## 2023-02-22 PROCEDURE — 88307 TISSUE EXAM BY PATHOLOGIST: CPT | Performed by: SURGERY

## 2023-02-22 PROCEDURE — 25010000002 HYDROMORPHONE 1 MG/ML SOLUTION: Performed by: SURGERY

## 2023-02-22 PROCEDURE — 25010000002 DEXAMETHASONE SODIUM PHOSPHATE 20 MG/5ML SOLUTION: Performed by: NURSE ANESTHETIST, CERTIFIED REGISTERED

## 2023-02-22 PROCEDURE — 25010000002 HYDROMORPHONE PER 4 MG: Performed by: SURGERY

## 2023-02-22 PROCEDURE — 85025 COMPLETE CBC W/AUTO DIFF WBC: CPT | Performed by: SURGERY

## 2023-02-22 PROCEDURE — 25010000002 FENTANYL CITRATE (PF) 50 MCG/ML SOLUTION: Performed by: NURSE ANESTHETIST, CERTIFIED REGISTERED

## 2023-02-22 PROCEDURE — C9290 INJ, BUPIVACAINE LIPOSOME: HCPCS | Performed by: SURGERY

## 2023-02-22 PROCEDURE — 25010000002 CEFAZOLIN IN DEXTROSE 2-4 GM/100ML-% SOLUTION: Performed by: SURGERY

## 2023-02-22 PROCEDURE — 25010000002 PROPOFOL 10 MG/ML EMULSION: Performed by: NURSE ANESTHETIST, CERTIFIED REGISTERED

## 2023-02-22 PROCEDURE — 87070 CULTURE OTHR SPECIMN AEROBIC: CPT | Performed by: SURGERY

## 2023-02-22 PROCEDURE — 80053 COMPREHEN METABOLIC PANEL: CPT | Performed by: SURGERY

## 2023-02-22 PROCEDURE — 25010000002 HYDROMORPHONE PER 4 MG: Performed by: NURSE ANESTHETIST, CERTIFIED REGISTERED

## 2023-02-22 DEVICE — PROXIMATE RELOADABLE LINEAR CUTTER WITH SAFETY LOCK-OUT, 75MM
Type: IMPLANTABLE DEVICE | Site: ABDOMEN | Status: FUNCTIONAL
Brand: PROXIMATE

## 2023-02-22 DEVICE — PROXIMATE LINEAR CUTTER RELOAD, BLUE, 75MM
Type: IMPLANTABLE DEVICE | Site: ABDOMEN | Status: FUNCTIONAL
Brand: PROXIMATE

## 2023-02-22 DEVICE — HORIZON TI ML 6 CLIPS/CART
Type: IMPLANTABLE DEVICE | Site: ABDOMEN | Status: FUNCTIONAL
Brand: WECK

## 2023-02-22 RX ORDER — NALOXONE HCL 0.4 MG/ML
0.2 VIAL (ML) INJECTION AS NEEDED
Status: DISCONTINUED | OUTPATIENT
Start: 2023-02-22 | End: 2023-02-22 | Stop reason: HOSPADM

## 2023-02-22 RX ORDER — METRONIDAZOLE 500 MG/100ML
500 INJECTION, SOLUTION INTRAVENOUS ONCE
OUTPATIENT
Start: 2023-02-22 | End: 2023-02-22

## 2023-02-22 RX ORDER — FAMOTIDINE 10 MG/ML
20 INJECTION, SOLUTION INTRAVENOUS ONCE
Status: COMPLETED | OUTPATIENT
Start: 2023-02-22 | End: 2023-02-22

## 2023-02-22 RX ORDER — HYDROMORPHONE HYDROCHLORIDE 1 MG/ML
0.25 INJECTION, SOLUTION INTRAMUSCULAR; INTRAVENOUS; SUBCUTANEOUS
Status: DISCONTINUED | OUTPATIENT
Start: 2023-02-22 | End: 2023-02-22 | Stop reason: HOSPADM

## 2023-02-22 RX ORDER — LABETALOL HYDROCHLORIDE 5 MG/ML
5 INJECTION, SOLUTION INTRAVENOUS
Status: DISCONTINUED | OUTPATIENT
Start: 2023-02-22 | End: 2023-02-22 | Stop reason: HOSPADM

## 2023-02-22 RX ORDER — SUCCINYLCHOLINE/SOD CL,ISO/PF 200MG/10ML
SYRINGE (ML) INTRAVENOUS AS NEEDED
Status: DISCONTINUED | OUTPATIENT
Start: 2023-02-22 | End: 2023-02-22 | Stop reason: SURG

## 2023-02-22 RX ORDER — NALOXONE HCL 0.4 MG/ML
0.1 VIAL (ML) INJECTION
Status: DISCONTINUED | OUTPATIENT
Start: 2023-02-22 | End: 2023-02-24

## 2023-02-22 RX ORDER — DROPERIDOL 2.5 MG/ML
0.62 INJECTION, SOLUTION INTRAMUSCULAR; INTRAVENOUS ONCE AS NEEDED
Status: DISCONTINUED | OUTPATIENT
Start: 2023-02-22 | End: 2023-02-22 | Stop reason: HOSPADM

## 2023-02-22 RX ORDER — CEFAZOLIN SODIUM 2 G/100ML
2 INJECTION, SOLUTION INTRAVENOUS ONCE
Status: CANCELLED | OUTPATIENT
Start: 2023-02-22 | End: 2023-02-22

## 2023-02-22 RX ORDER — SODIUM CHLORIDE AND POTASSIUM CHLORIDE 150; 450 MG/100ML; MG/100ML
100 INJECTION, SOLUTION INTRAVENOUS CONTINUOUS
Status: DISCONTINUED | OUTPATIENT
Start: 2023-02-22 | End: 2023-02-23

## 2023-02-22 RX ORDER — SODIUM CHLORIDE, SODIUM LACTATE, POTASSIUM CHLORIDE, CALCIUM CHLORIDE 600; 310; 30; 20 MG/100ML; MG/100ML; MG/100ML; MG/100ML
9 INJECTION, SOLUTION INTRAVENOUS CONTINUOUS
Status: DISCONTINUED | OUTPATIENT
Start: 2023-02-22 | End: 2023-02-22

## 2023-02-22 RX ORDER — KETAMINE HCL IN NACL, ISO-OSM 100MG/10ML
SYRINGE (ML) INJECTION AS NEEDED
Status: DISCONTINUED | OUTPATIENT
Start: 2023-02-22 | End: 2023-02-22 | Stop reason: SURG

## 2023-02-22 RX ORDER — ONDANSETRON 2 MG/ML
4 INJECTION INTRAMUSCULAR; INTRAVENOUS ONCE AS NEEDED
Status: DISCONTINUED | OUTPATIENT
Start: 2023-02-22 | End: 2023-02-22 | Stop reason: HOSPADM

## 2023-02-22 RX ORDER — FLUMAZENIL 0.1 MG/ML
0.2 INJECTION INTRAVENOUS AS NEEDED
Status: DISCONTINUED | OUTPATIENT
Start: 2023-02-22 | End: 2023-02-22 | Stop reason: HOSPADM

## 2023-02-22 RX ORDER — CEFAZOLIN SODIUM 2 G/100ML
2 INJECTION, SOLUTION INTRAVENOUS EVERY 8 HOURS
Status: COMPLETED | OUTPATIENT
Start: 2023-02-22 | End: 2023-02-23

## 2023-02-22 RX ORDER — HYDROMORPHONE HYDROCHLORIDE 1 MG/ML
0.5 INJECTION, SOLUTION INTRAMUSCULAR; INTRAVENOUS; SUBCUTANEOUS
Status: DISCONTINUED | OUTPATIENT
Start: 2023-02-22 | End: 2023-02-24

## 2023-02-22 RX ORDER — ACETAMINOPHEN 325 MG/1
650 TABLET ORAL ONCE AS NEEDED
Status: DISCONTINUED | OUTPATIENT
Start: 2023-02-22 | End: 2023-02-22 | Stop reason: HOSPADM

## 2023-02-22 RX ORDER — FENTANYL CITRATE 50 UG/ML
50 INJECTION, SOLUTION INTRAMUSCULAR; INTRAVENOUS
Status: DISCONTINUED | OUTPATIENT
Start: 2023-02-22 | End: 2023-02-22 | Stop reason: HOSPADM

## 2023-02-22 RX ORDER — METRONIDAZOLE 500 MG/100ML
500 INJECTION, SOLUTION INTRAVENOUS EVERY 6 HOURS
Status: COMPLETED | OUTPATIENT
Start: 2023-02-23 | End: 2023-02-23

## 2023-02-22 RX ORDER — HYDROCODONE BITARTRATE AND ACETAMINOPHEN 5; 325 MG/1; MG/1
1 TABLET ORAL EVERY 4 HOURS PRN
Status: DISCONTINUED | OUTPATIENT
Start: 2023-02-22 | End: 2023-02-26

## 2023-02-22 RX ORDER — CEFAZOLIN SODIUM 2 G/100ML
2 INJECTION, SOLUTION INTRAVENOUS ONCE
OUTPATIENT
Start: 2023-02-22 | End: 2023-02-22

## 2023-02-22 RX ORDER — PROMETHAZINE HYDROCHLORIDE 25 MG/1
25 TABLET ORAL ONCE AS NEEDED
Status: DISCONTINUED | OUTPATIENT
Start: 2023-02-22 | End: 2023-02-22 | Stop reason: HOSPADM

## 2023-02-22 RX ORDER — FENTANYL CITRATE 50 UG/ML
25 INJECTION, SOLUTION INTRAMUSCULAR; INTRAVENOUS
Status: DISCONTINUED | OUTPATIENT
Start: 2023-02-22 | End: 2023-02-22 | Stop reason: HOSPADM

## 2023-02-22 RX ORDER — DEXMEDETOMIDINE HYDROCHLORIDE 100 UG/ML
INJECTION, SOLUTION INTRAVENOUS AS NEEDED
Status: DISCONTINUED | OUTPATIENT
Start: 2023-02-22 | End: 2023-02-22 | Stop reason: SURG

## 2023-02-22 RX ORDER — MAGNESIUM HYDROXIDE 1200 MG/15ML
LIQUID ORAL AS NEEDED
Status: DISCONTINUED | OUTPATIENT
Start: 2023-02-22 | End: 2023-02-22 | Stop reason: HOSPADM

## 2023-02-22 RX ORDER — METRONIDAZOLE 500 MG/100ML
500 INJECTION, SOLUTION INTRAVENOUS ONCE
Status: CANCELLED | OUTPATIENT
Start: 2023-02-22 | End: 2023-02-22

## 2023-02-22 RX ORDER — LIDOCAINE HYDROCHLORIDE 20 MG/ML
INJECTION, SOLUTION INFILTRATION; PERINEURAL AS NEEDED
Status: DISCONTINUED | OUTPATIENT
Start: 2023-02-22 | End: 2023-02-22 | Stop reason: SURG

## 2023-02-22 RX ORDER — ROCURONIUM BROMIDE 10 MG/ML
INJECTION, SOLUTION INTRAVENOUS AS NEEDED
Status: DISCONTINUED | OUTPATIENT
Start: 2023-02-22 | End: 2023-02-22 | Stop reason: SURG

## 2023-02-22 RX ORDER — HYDRALAZINE HYDROCHLORIDE 20 MG/ML
5 INJECTION INTRAMUSCULAR; INTRAVENOUS
Status: DISCONTINUED | OUTPATIENT
Start: 2023-02-22 | End: 2023-02-22 | Stop reason: HOSPADM

## 2023-02-22 RX ORDER — ONDANSETRON 2 MG/ML
INJECTION INTRAMUSCULAR; INTRAVENOUS AS NEEDED
Status: DISCONTINUED | OUTPATIENT
Start: 2023-02-22 | End: 2023-02-22 | Stop reason: SURG

## 2023-02-22 RX ORDER — LABETALOL HYDROCHLORIDE 5 MG/ML
INJECTION, SOLUTION INTRAVENOUS AS NEEDED
Status: DISCONTINUED | OUTPATIENT
Start: 2023-02-22 | End: 2023-02-22 | Stop reason: SURG

## 2023-02-22 RX ORDER — METRONIDAZOLE 500 MG/100ML
500 INJECTION, SOLUTION INTRAVENOUS ONCE
Status: COMPLETED | OUTPATIENT
Start: 2023-02-22 | End: 2023-02-22

## 2023-02-22 RX ORDER — PROMETHAZINE HYDROCHLORIDE 25 MG/1
25 SUPPOSITORY RECTAL ONCE AS NEEDED
Status: DISCONTINUED | OUTPATIENT
Start: 2023-02-22 | End: 2023-02-22 | Stop reason: HOSPADM

## 2023-02-22 RX ORDER — EPHEDRINE SULFATE 50 MG/ML
5 INJECTION, SOLUTION INTRAVENOUS ONCE AS NEEDED
Status: DISCONTINUED | OUTPATIENT
Start: 2023-02-22 | End: 2023-02-22 | Stop reason: HOSPADM

## 2023-02-22 RX ORDER — LIDOCAINE HYDROCHLORIDE 10 MG/ML
0.5 INJECTION, SOLUTION EPIDURAL; INFILTRATION; INTRACAUDAL; PERINEURAL ONCE AS NEEDED
Status: DISCONTINUED | OUTPATIENT
Start: 2023-02-22 | End: 2023-02-22 | Stop reason: HOSPADM

## 2023-02-22 RX ORDER — DEXAMETHASONE SODIUM PHOSPHATE 4 MG/ML
INJECTION, SOLUTION INTRA-ARTICULAR; INTRALESIONAL; INTRAMUSCULAR; INTRAVENOUS; SOFT TISSUE AS NEEDED
Status: DISCONTINUED | OUTPATIENT
Start: 2023-02-22 | End: 2023-02-22 | Stop reason: SURG

## 2023-02-22 RX ORDER — PHENYLEPHRINE HCL IN 0.9% NACL 1 MG/10 ML
SYRINGE (ML) INTRAVENOUS AS NEEDED
Status: DISCONTINUED | OUTPATIENT
Start: 2023-02-22 | End: 2023-02-22 | Stop reason: SURG

## 2023-02-22 RX ORDER — BUPIVACAINE HYDROCHLORIDE AND EPINEPHRINE 5; 5 MG/ML; UG/ML
INJECTION, SOLUTION PERINEURAL AS NEEDED
Status: DISCONTINUED | OUTPATIENT
Start: 2023-02-22 | End: 2023-02-22 | Stop reason: HOSPADM

## 2023-02-22 RX ORDER — CEFAZOLIN SODIUM 2 G/100ML
2 INJECTION, SOLUTION INTRAVENOUS ONCE
Status: COMPLETED | OUTPATIENT
Start: 2023-02-22 | End: 2023-02-22

## 2023-02-22 RX ORDER — PROPOFOL 10 MG/ML
VIAL (ML) INTRAVENOUS AS NEEDED
Status: DISCONTINUED | OUTPATIENT
Start: 2023-02-22 | End: 2023-02-22 | Stop reason: SURG

## 2023-02-22 RX ORDER — HYDROCODONE BITARTRATE AND ACETAMINOPHEN 5; 325 MG/1; MG/1
1 TABLET ORAL ONCE AS NEEDED
Status: DISCONTINUED | OUTPATIENT
Start: 2023-02-22 | End: 2023-02-22 | Stop reason: HOSPADM

## 2023-02-22 RX ORDER — FENTANYL CITRATE 50 UG/ML
INJECTION, SOLUTION INTRAMUSCULAR; INTRAVENOUS AS NEEDED
Status: DISCONTINUED | OUTPATIENT
Start: 2023-02-22 | End: 2023-02-22 | Stop reason: SURG

## 2023-02-22 RX ORDER — MIDAZOLAM HYDROCHLORIDE 1 MG/ML
0.5 INJECTION INTRAMUSCULAR; INTRAVENOUS
Status: DISCONTINUED | OUTPATIENT
Start: 2023-02-22 | End: 2023-02-22 | Stop reason: HOSPADM

## 2023-02-22 RX ORDER — ACETAMINOPHEN 650 MG/1
650 SUPPOSITORY RECTAL ONCE AS NEEDED
Status: DISCONTINUED | OUTPATIENT
Start: 2023-02-22 | End: 2023-02-22 | Stop reason: HOSPADM

## 2023-02-22 RX ORDER — SODIUM CHLORIDE 0.9 % (FLUSH) 0.9 %
3 SYRINGE (ML) INJECTION EVERY 12 HOURS SCHEDULED
Status: DISCONTINUED | OUTPATIENT
Start: 2023-02-22 | End: 2023-02-22 | Stop reason: HOSPADM

## 2023-02-22 RX ORDER — SODIUM CHLORIDE 0.9 % (FLUSH) 0.9 %
3-10 SYRINGE (ML) INJECTION AS NEEDED
Status: DISCONTINUED | OUTPATIENT
Start: 2023-02-22 | End: 2023-02-22 | Stop reason: HOSPADM

## 2023-02-22 RX ORDER — OXYCODONE AND ACETAMINOPHEN 10; 325 MG/1; MG/1
1 TABLET ORAL EVERY 4 HOURS PRN
Status: DISCONTINUED | OUTPATIENT
Start: 2023-02-22 | End: 2023-03-05 | Stop reason: HOSPADM

## 2023-02-22 RX ADMIN — LABETALOL HYDROCHLORIDE 5 MG: 5 INJECTION, SOLUTION INTRAVENOUS at 15:33

## 2023-02-22 RX ADMIN — Medication 140 MG: at 14:42

## 2023-02-22 RX ADMIN — PROPOFOL 25 MCG/KG/MIN: 10 INJECTION, EMULSION INTRAVENOUS at 14:50

## 2023-02-22 RX ADMIN — Medication 25 MG: at 14:50

## 2023-02-22 RX ADMIN — HYDROMORPHONE HYDROCHLORIDE 0.25 MG: 1 INJECTION, SOLUTION INTRAMUSCULAR; INTRAVENOUS; SUBCUTANEOUS at 18:54

## 2023-02-22 RX ADMIN — HYDROCODONE BITARTRATE AND ACETAMINOPHEN 1 TABLET: 10; 325 TABLET ORAL at 10:23

## 2023-02-22 RX ADMIN — LIDOCAINE HYDROCHLORIDE 60 MG: 20 INJECTION, SOLUTION INFILTRATION; PERINEURAL at 17:49

## 2023-02-22 RX ADMIN — CEFAZOLIN SODIUM 2 G: 2 INJECTION, SOLUTION INTRAVENOUS at 14:26

## 2023-02-22 RX ADMIN — FENTANYL CITRATE 50 MCG: 50 INJECTION, SOLUTION INTRAMUSCULAR; INTRAVENOUS at 15:33

## 2023-02-22 RX ADMIN — ONDANSETRON 4 MG: 2 INJECTION INTRAMUSCULAR; INTRAVENOUS at 17:39

## 2023-02-22 RX ADMIN — FENTANYL CITRATE 25 MCG: 50 INJECTION, SOLUTION INTRAMUSCULAR; INTRAVENOUS at 14:56

## 2023-02-22 RX ADMIN — ROCURONIUM BROMIDE 10 MG: 50 INJECTION INTRAVENOUS at 15:25

## 2023-02-22 RX ADMIN — SODIUM CHLORIDE, POTASSIUM CHLORIDE, SODIUM LACTATE AND CALCIUM CHLORIDE: 600; 310; 30; 20 INJECTION, SOLUTION INTRAVENOUS at 16:18

## 2023-02-22 RX ADMIN — Medication 50 MCG: at 15:46

## 2023-02-22 RX ADMIN — POTASSIUM CHLORIDE, DEXTROSE MONOHYDRATE AND SODIUM CHLORIDE 100 ML/HR: 150; 5; 450 INJECTION, SOLUTION INTRAVENOUS at 06:07

## 2023-02-22 RX ADMIN — DEXAMETHASONE SODIUM PHOSPHATE 8 MG: 4 INJECTION, SOLUTION INTRAMUSCULAR; INTRAVENOUS at 14:55

## 2023-02-22 RX ADMIN — LATANOPROST 1 DROP: 50 SOLUTION OPHTHALMIC at 21:01

## 2023-02-22 RX ADMIN — ROCURONIUM BROMIDE 40 MG: 50 INJECTION INTRAVENOUS at 14:55

## 2023-02-22 RX ADMIN — PROPOFOL 150 MG: 10 INJECTION, EMULSION INTRAVENOUS at 14:42

## 2023-02-22 RX ADMIN — FENTANYL CITRATE 25 MCG: 50 INJECTION, SOLUTION INTRAMUSCULAR; INTRAVENOUS at 15:14

## 2023-02-22 RX ADMIN — METRONIDAZOLE 500 MG: 500 INJECTION, SOLUTION INTRAVENOUS at 14:25

## 2023-02-22 RX ADMIN — SUGAMMADEX 200 MG: 100 INJECTION, SOLUTION INTRAVENOUS at 17:48

## 2023-02-22 RX ADMIN — Medication 15 MG: at 16:50

## 2023-02-22 RX ADMIN — Medication 10 MG: at 15:50

## 2023-02-22 RX ADMIN — CALCIUM CARBONATE-VITAMIN D TAB 500 MG-200 UNIT 1 TABLET: 500-200 TAB at 08:30

## 2023-02-22 RX ADMIN — Medication 10 ML: at 21:03

## 2023-02-22 RX ADMIN — Medication 100 MCG: at 15:05

## 2023-02-22 RX ADMIN — SODIUM CHLORIDE, POTASSIUM CHLORIDE, SODIUM LACTATE AND CALCIUM CHLORIDE 9 ML/HR: 600; 310; 30; 20 INJECTION, SOLUTION INTRAVENOUS at 14:25

## 2023-02-22 RX ADMIN — HYDROMORPHONE HYDROCHLORIDE 0.5 MG: 1 INJECTION, SOLUTION INTRAMUSCULAR; INTRAVENOUS; SUBCUTANEOUS at 09:11

## 2023-02-22 RX ADMIN — ROCURONIUM BROMIDE 10 MG: 50 INJECTION INTRAVENOUS at 17:09

## 2023-02-22 RX ADMIN — HYDROMORPHONE HYDROCHLORIDE 0.5 MG: 1 INJECTION, SOLUTION INTRAMUSCULAR; INTRAVENOUS; SUBCUTANEOUS at 03:28

## 2023-02-22 RX ADMIN — HYDROCODONE BITARTRATE AND ACETAMINOPHEN 1 TABLET: 10; 325 TABLET ORAL at 04:55

## 2023-02-22 RX ADMIN — HYDROMORPHONE HYDROCHLORIDE 0.5 MG: 1 INJECTION, SOLUTION INTRAMUSCULAR; INTRAVENOUS; SUBCUTANEOUS at 06:51

## 2023-02-22 RX ADMIN — CALCITRIOL 0.25 MCG: 0.25 CAPSULE ORAL at 08:30

## 2023-02-22 RX ADMIN — HYDROMORPHONE HYDROCHLORIDE 0.25 MG: 1 INJECTION, SOLUTION INTRAMUSCULAR; INTRAVENOUS; SUBCUTANEOUS at 19:24

## 2023-02-22 RX ADMIN — METRONIDAZOLE 4 MG: 500 INJECTION, SOLUTION INTRAVENOUS at 17:25

## 2023-02-22 RX ADMIN — LIDOCAINE HYDROCHLORIDE 100 MG: 20 INJECTION, SOLUTION INFILTRATION; PERINEURAL at 14:42

## 2023-02-22 RX ADMIN — FENTANYL CITRATE 25 MCG: 50 INJECTION, SOLUTION INTRAMUSCULAR; INTRAVENOUS at 19:38

## 2023-02-22 RX ADMIN — MAGNESIUM OXIDE 400 MG (241.3 MG MAGNESIUM) TABLET 400 MG: TABLET at 08:30

## 2023-02-22 RX ADMIN — FENTANYL CITRATE 25 MCG: 50 INJECTION, SOLUTION INTRAMUSCULAR; INTRAVENOUS at 19:28

## 2023-02-22 RX ADMIN — HYDROMORPHONE HYDROCHLORIDE 0.5 MG: 1 INJECTION, SOLUTION INTRAMUSCULAR; INTRAVENOUS; SUBCUTANEOUS at 23:10

## 2023-02-22 RX ADMIN — ROCURONIUM BROMIDE 10 MG: 50 INJECTION INTRAVENOUS at 14:42

## 2023-02-22 RX ADMIN — FAMOTIDINE 20 MG: 10 INJECTION INTRAVENOUS at 14:25

## 2023-02-22 RX ADMIN — DEXMEDETOMIDINE 20 MCG: 100 INJECTION, SOLUTION, CONCENTRATE INTRAVENOUS at 17:30

## 2023-02-22 RX ADMIN — CEFAZOLIN SODIUM 2 G: 2 INJECTION, SOLUTION INTRAVENOUS at 23:56

## 2023-02-22 RX ADMIN — LEVOTHYROXINE SODIUM 125 MCG: 0.12 TABLET ORAL at 06:07

## 2023-02-22 RX ADMIN — HYDROMORPHONE HYDROCHLORIDE 0.5 MG: 1 INJECTION, SOLUTION INTRAMUSCULAR; INTRAVENOUS; SUBCUTANEOUS at 20:58

## 2023-02-22 RX ADMIN — CITALOPRAM 20 MG: 20 TABLET, FILM COATED ORAL at 08:30

## 2023-02-22 NOTE — ANESTHESIA PREPROCEDURE EVALUATION
Anesthesia Evaluation     Patient summary reviewed and Nursing notes reviewed   history of anesthetic complications: PONV  NPO Solid Status: > 6 hours  NPO Liquid Status: > 2 hours           Airway   Mallampati: II  TM distance: >3 FB  Neck ROM: full  Dental - normal exam     Pulmonary    (-) COPD, asthma, sleep apnea, not a smoker  Cardiovascular   Exercise tolerance: good (4-7 METS)    ECG reviewed    (+) hyperlipidemia,   (-) valvular problems/murmurs, CAD, dysrhythmias, angina, cardiac stents      Neuro/Psych  (+) psychiatric history Depression,    (-) seizures, CVA, tremors, weakness  GI/Hepatic/Renal/Endo    (+) obesity,  GERD,  liver disease fatty liver disease, renal disease stones, thyroid problem hypothyroidism  (-) diabetes    ROS Comment: Small bowel obstruction    Musculoskeletal     Abdominal    Substance History      OB/GYN          Other   arthritis,                      Anesthesia Plan    ASA 3     general   Rapid sequence    Anesthetic plan, risks, benefits, and alternatives have been provided, discussed and informed consent has been obtained with: patient.        CODE STATUS:    Code Status (Patient has no pulse and is not breathing): CPR (Attempt to Resuscitate)  Medical Interventions (Patient has pulse or is breathing): Full Support

## 2023-02-22 NOTE — ANESTHESIA PROCEDURE NOTES
Airway  Urgency: elective    Date/Time: 2/22/2023 2:45 PM  Airway not difficult    General Information and Staff    Patient location during procedure: OR  Anesthesiologist: Collin Ortiz MD  CRNA/CAA: Ester Cortez CRNA    Indications and Patient Condition  Indications for airway management: airway protection    Preoxygenated: yes (pt pre-O2 with 100% O2)  Mask difficulty assessment: 2 - vent by mask + OA or adjuvant +/- NMBA (easy BMV )    Final Airway Details  Final airway type: endotracheal airway      Successful airway: ETT  Cuffed: yes   Successful intubation technique: direct laryngoscopy and RSI  Facilitating devices/methods: cricoid pressure  Endotracheal tube insertion site: oral  Blade: Felisa  Blade size: 3  Cormack-Lehane Classification: grade I - full view of glottis  Placement verified by: chest auscultation and capnometry   Cuff volume (mL): 7  Measured from: lips  Number of attempts at approach: 1  Assessment: lips, teeth, and gum same as pre-op and atraumatic intubation    Additional Comments  ATOETx1. No change in dentition.

## 2023-02-23 ENCOUNTER — APPOINTMENT (OUTPATIENT)
Dept: GENERAL RADIOLOGY | Facility: HOSPITAL | Age: 73
DRG: 330 | End: 2023-02-23
Payer: MEDICARE

## 2023-02-23 LAB
ALBUMIN SERPL-MCNC: 3.1 G/DL (ref 3.5–5.2)
ALBUMIN SERPL-MCNC: 3.2 G/DL (ref 3.5–5.2)
ALBUMIN SERPL-MCNC: 3.4 G/DL (ref 3.5–5.2)
ALBUMIN/GLOB SERPL: 1.2 G/DL
ALBUMIN/GLOB SERPL: 1.3 G/DL
ALP SERPL-CCNC: 62 U/L (ref 39–117)
ALP SERPL-CCNC: 64 U/L (ref 39–117)
ALP SERPL-CCNC: 67 U/L (ref 39–117)
ALT SERPL W P-5'-P-CCNC: 10 U/L (ref 1–33)
ALT SERPL W P-5'-P-CCNC: 7 U/L (ref 1–33)
ALT SERPL W P-5'-P-CCNC: 8 U/L (ref 1–33)
ANION GAP SERPL CALCULATED.3IONS-SCNC: 6.8 MMOL/L (ref 5–15)
ANION GAP SERPL CALCULATED.3IONS-SCNC: 8.4 MMOL/L (ref 5–15)
AST SERPL-CCNC: 10 U/L (ref 1–32)
AST SERPL-CCNC: 11 U/L (ref 1–32)
AST SERPL-CCNC: 11 U/L (ref 1–32)
BASOPHILS # BLD AUTO: 0.04 10*3/MM3 (ref 0–0.2)
BASOPHILS NFR BLD AUTO: 0.2 % (ref 0–1.5)
BILIRUB CONJ SERPL-MCNC: <0.2 MG/DL (ref 0–0.3)
BILIRUB INDIRECT SERPL-MCNC: ABNORMAL MG/DL
BILIRUB SERPL-MCNC: 0.2 MG/DL (ref 0–1.2)
BILIRUB SERPL-MCNC: <0.2 MG/DL (ref 0–1.2)
BILIRUB SERPL-MCNC: <0.2 MG/DL (ref 0–1.2)
BUN SERPL-MCNC: 6 MG/DL (ref 8–23)
BUN SERPL-MCNC: 7 MG/DL (ref 8–23)
BUN/CREAT SERPL: 4.8 (ref 7–25)
BUN/CREAT SERPL: 5.3 (ref 7–25)
CA-I BLD-MCNC: 4.4 MG/DL (ref 4.6–5.4)
CA-I SERPL ISE-MCNC: 1.1 MMOL/L (ref 1.15–1.35)
CALCIUM SPEC-SCNC: 7.4 MG/DL (ref 8.6–10.5)
CALCIUM SPEC-SCNC: 7.5 MG/DL (ref 8.6–10.5)
CHLORIDE SERPL-SCNC: 102 MMOL/L (ref 98–107)
CHLORIDE SERPL-SCNC: 104 MMOL/L (ref 98–107)
CHOLEST SERPL-MCNC: 147 MG/DL (ref 0–200)
CO2 SERPL-SCNC: 22.2 MMOL/L (ref 22–29)
CO2 SERPL-SCNC: 23.6 MMOL/L (ref 22–29)
CREAT SERPL-MCNC: 1.24 MG/DL (ref 0.57–1)
CREAT SERPL-MCNC: 1.32 MG/DL (ref 0.57–1)
CRP SERPL-MCNC: 2.11 MG/DL (ref 0–0.5)
DEPRECATED RDW RBC AUTO: 50.3 FL (ref 37–54)
EGFRCR SERPLBLD CKD-EPI 2021: 42.5 ML/MIN/1.73
EGFRCR SERPLBLD CKD-EPI 2021: 45.8 ML/MIN/1.73
EOSINOPHIL # BLD AUTO: 0.01 10*3/MM3 (ref 0–0.4)
EOSINOPHIL NFR BLD AUTO: 0.1 % (ref 0.3–6.2)
ERYTHROCYTE [DISTWIDTH] IN BLOOD BY AUTOMATED COUNT: 17.2 % (ref 12.3–15.4)
GLOBULIN UR ELPH-MCNC: 2.6 GM/DL
GLOBULIN UR ELPH-MCNC: 2.7 GM/DL
GLUCOSE BLDC GLUCOMTR-MCNC: 136 MG/DL (ref 70–130)
GLUCOSE SERPL-MCNC: 113 MG/DL (ref 65–99)
GLUCOSE SERPL-MCNC: 117 MG/DL (ref 65–99)
HCT VFR BLD AUTO: 35.7 % (ref 34–46.6)
HGB BLD-MCNC: 10.9 G/DL (ref 12–15.9)
IMM GRANULOCYTES # BLD AUTO: 0.13 10*3/MM3 (ref 0–0.05)
IMM GRANULOCYTES NFR BLD AUTO: 0.8 % (ref 0–0.5)
LYMPHOCYTES # BLD AUTO: 1.08 10*3/MM3 (ref 0.7–3.1)
LYMPHOCYTES NFR BLD AUTO: 6.5 % (ref 19.6–45.3)
MAGNESIUM SERPL-MCNC: 1.6 MG/DL (ref 1.6–2.4)
MCH RBC QN AUTO: 24.2 PG (ref 26.6–33)
MCHC RBC AUTO-ENTMCNC: 30.5 G/DL (ref 31.5–35.7)
MCV RBC AUTO: 79.2 FL (ref 79–97)
MONOCYTES # BLD AUTO: 1.72 10*3/MM3 (ref 0.1–0.9)
MONOCYTES NFR BLD AUTO: 10.3 % (ref 5–12)
NEUTROPHILS NFR BLD AUTO: 13.65 10*3/MM3 (ref 1.7–7)
NEUTROPHILS NFR BLD AUTO: 82.1 % (ref 42.7–76)
NRBC BLD AUTO-RTO: 0 /100 WBC (ref 0–0.2)
PHOSPHATE SERPL-MCNC: 4.4 MG/DL (ref 2.5–4.5)
PLATELET # BLD AUTO: 457 10*3/MM3 (ref 140–450)
PMV BLD AUTO: 9.6 FL (ref 6–12)
POTASSIUM SERPL-SCNC: 4.5 MMOL/L (ref 3.5–5.2)
POTASSIUM SERPL-SCNC: 4.5 MMOL/L (ref 3.5–5.2)
PREALB SERPL-MCNC: 13.4 MG/DL (ref 20–40)
PROT SERPL-MCNC: 5.8 G/DL (ref 6–8.5)
PROT SERPL-MCNC: 5.9 G/DL (ref 6–8.5)
PROT SERPL-MCNC: 6.1 G/DL (ref 6–8.5)
RBC # BLD AUTO: 4.51 10*6/MM3 (ref 3.77–5.28)
SODIUM SERPL-SCNC: 133 MMOL/L (ref 136–145)
SODIUM SERPL-SCNC: 134 MMOL/L (ref 136–145)
TRIGL SERPL-MCNC: 100 MG/DL (ref 0–150)
WBC NRBC COR # BLD: 16.63 10*3/MM3 (ref 3.4–10.8)

## 2023-02-23 PROCEDURE — 0 POTASSIUM CHLORIDE PER 2 MEQ: Performed by: SURGERY

## 2023-02-23 PROCEDURE — 25010000002 HYDROMORPHONE PER 4 MG: Performed by: SURGERY

## 2023-02-23 PROCEDURE — 25010000002 MAGNESIUM SULFATE PER 500 MG OF MAGNESIUM: Performed by: SURGERY

## 2023-02-23 PROCEDURE — 82248 BILIRUBIN DIRECT: CPT | Performed by: SURGERY

## 2023-02-23 PROCEDURE — 86140 C-REACTIVE PROTEIN: CPT | Performed by: SURGERY

## 2023-02-23 PROCEDURE — 25010000002 CALCIUM GLUCONATE PER 10 ML: Performed by: SURGERY

## 2023-02-23 PROCEDURE — 84100 ASSAY OF PHOSPHORUS: CPT | Performed by: SURGERY

## 2023-02-23 PROCEDURE — 80053 COMPREHEN METABOLIC PANEL: CPT | Performed by: SURGERY

## 2023-02-23 PROCEDURE — C1751 CATH, INF, PER/CENT/MIDLINE: HCPCS

## 2023-02-23 PROCEDURE — 84134 ASSAY OF PREALBUMIN: CPT | Performed by: SURGERY

## 2023-02-23 PROCEDURE — 71045 X-RAY EXAM CHEST 1 VIEW: CPT

## 2023-02-23 PROCEDURE — 85025 COMPLETE CBC W/AUTO DIFF WBC: CPT | Performed by: SURGERY

## 2023-02-23 PROCEDURE — 3E0336Z INTRODUCTION OF NUTRITIONAL SUBSTANCE INTO PERIPHERAL VEIN, PERCUTANEOUS APPROACH: ICD-10-PCS | Performed by: SURGERY

## 2023-02-23 PROCEDURE — 82465 ASSAY BLD/SERUM CHOLESTEROL: CPT | Performed by: SURGERY

## 2023-02-23 PROCEDURE — 83735 ASSAY OF MAGNESIUM: CPT | Performed by: SURGERY

## 2023-02-23 PROCEDURE — 0DBK0ZZ EXCISION OF ASCENDING COLON, OPEN APPROACH: ICD-10-PCS | Performed by: SURGERY

## 2023-02-23 PROCEDURE — 84478 ASSAY OF TRIGLYCERIDES: CPT | Performed by: SURGERY

## 2023-02-23 PROCEDURE — 0DBB0ZZ EXCISION OF ILEUM, OPEN APPROACH: ICD-10-PCS | Performed by: SURGERY

## 2023-02-23 PROCEDURE — 02HV33Z INSERTION OF INFUSION DEVICE INTO SUPERIOR VENA CAVA, PERCUTANEOUS APPROACH: ICD-10-PCS | Performed by: SURGERY

## 2023-02-23 PROCEDURE — 25010000002 CEFAZOLIN IN DEXTROSE 2-4 GM/100ML-% SOLUTION: Performed by: SURGERY

## 2023-02-23 PROCEDURE — 82330 ASSAY OF CALCIUM: CPT | Performed by: SURGERY

## 2023-02-23 PROCEDURE — 82962 GLUCOSE BLOOD TEST: CPT

## 2023-02-23 RX ORDER — SODIUM CHLORIDE 0.9 % (FLUSH) 0.9 %
20 SYRINGE (ML) INJECTION AS NEEDED
Status: DISCONTINUED | OUTPATIENT
Start: 2023-02-23 | End: 2023-03-05 | Stop reason: HOSPADM

## 2023-02-23 RX ORDER — INSULIN LISPRO 100 [IU]/ML
0-7 INJECTION, SOLUTION INTRAVENOUS; SUBCUTANEOUS
Status: DISCONTINUED | OUTPATIENT
Start: 2023-02-23 | End: 2023-02-27

## 2023-02-23 RX ORDER — SODIUM CHLORIDE 9 MG/ML
40 INJECTION, SOLUTION INTRAVENOUS AS NEEDED
Status: DISCONTINUED | OUTPATIENT
Start: 2023-02-23 | End: 2023-03-05 | Stop reason: HOSPADM

## 2023-02-23 RX ORDER — SODIUM CHLORIDE 0.9 % (FLUSH) 0.9 %
10 SYRINGE (ML) INJECTION AS NEEDED
Status: DISCONTINUED | OUTPATIENT
Start: 2023-02-23 | End: 2023-03-05 | Stop reason: HOSPADM

## 2023-02-23 RX ORDER — SODIUM CHLORIDE 0.9 % (FLUSH) 0.9 %
10 SYRINGE (ML) INJECTION EVERY 12 HOURS SCHEDULED
Status: DISCONTINUED | OUTPATIENT
Start: 2023-02-23 | End: 2023-03-05 | Stop reason: HOSPADM

## 2023-02-23 RX ORDER — SODIUM CHLORIDE AND POTASSIUM CHLORIDE 150; 450 MG/100ML; MG/100ML
50 INJECTION, SOLUTION INTRAVENOUS CONTINUOUS
Status: DISCONTINUED | OUTPATIENT
Start: 2023-02-23 | End: 2023-02-24

## 2023-02-23 RX ADMIN — LATANOPROST 1 DROP: 50 SOLUTION OPHTHALMIC at 20:43

## 2023-02-23 RX ADMIN — OXYCODONE HYDROCHLORIDE AND ACETAMINOPHEN 1 TABLET: 10; 325 TABLET ORAL at 11:17

## 2023-02-23 RX ADMIN — HYDROMORPHONE HYDROCHLORIDE 0.5 MG: 1 INJECTION, SOLUTION INTRAMUSCULAR; INTRAVENOUS; SUBCUTANEOUS at 05:16

## 2023-02-23 RX ADMIN — Medication 10 ML: at 14:00

## 2023-02-23 RX ADMIN — OXYCODONE HYDROCHLORIDE AND ACETAMINOPHEN 1 TABLET: 10; 325 TABLET ORAL at 00:01

## 2023-02-23 RX ADMIN — CEFAZOLIN SODIUM 2 G: 2 INJECTION, SOLUTION INTRAVENOUS at 05:20

## 2023-02-23 RX ADMIN — POTASSIUM PHOSPHATE, MONOBASIC POTASSIUM PHOSPHATE, DIBASIC: 224; 236 INJECTION, SOLUTION, CONCENTRATE INTRAVENOUS at 17:55

## 2023-02-23 RX ADMIN — Medication 10 ML: at 20:46

## 2023-02-23 RX ADMIN — MAGNESIUM OXIDE 400 MG (241.3 MG MAGNESIUM) TABLET 400 MG: TABLET at 08:45

## 2023-02-23 RX ADMIN — CITALOPRAM 20 MG: 20 TABLET, FILM COATED ORAL at 08:45

## 2023-02-23 RX ADMIN — LEVOTHYROXINE SODIUM 125 MCG: 0.12 TABLET ORAL at 05:16

## 2023-02-23 RX ADMIN — OXYCODONE HYDROCHLORIDE AND ACETAMINOPHEN 1 TABLET: 10; 325 TABLET ORAL at 06:57

## 2023-02-23 RX ADMIN — HYDROMORPHONE HYDROCHLORIDE 0.5 MG: 1 INJECTION, SOLUTION INTRAMUSCULAR; INTRAVENOUS; SUBCUTANEOUS at 01:16

## 2023-02-23 RX ADMIN — Medication 10 ML: at 08:46

## 2023-02-23 RX ADMIN — POTASSIUM CHLORIDE AND SODIUM CHLORIDE 100 ML/HR: 450; 150 INJECTION, SOLUTION INTRAVENOUS at 00:00

## 2023-02-23 RX ADMIN — HYDROMORPHONE HYDROCHLORIDE 0.5 MG: 1 INJECTION, SOLUTION INTRAMUSCULAR; INTRAVENOUS; SUBCUTANEOUS at 13:40

## 2023-02-23 RX ADMIN — METRONIDAZOLE 500 MG: 500 INJECTION, SOLUTION INTRAVENOUS at 02:29

## 2023-02-23 RX ADMIN — POTASSIUM CHLORIDE, DEXTROSE MONOHYDRATE AND SODIUM CHLORIDE 100 ML/HR: 150; 5; 450 INJECTION, SOLUTION INTRAVENOUS at 12:27

## 2023-02-23 RX ADMIN — POTASSIUM CHLORIDE AND SODIUM CHLORIDE 50 ML/HR: 450; 150 INJECTION, SOLUTION INTRAVENOUS at 16:48

## 2023-02-23 RX ADMIN — HYDROMORPHONE HYDROCHLORIDE 0.5 MG: 1 INJECTION, SOLUTION INTRAMUSCULAR; INTRAVENOUS; SUBCUTANEOUS at 20:39

## 2023-02-23 RX ADMIN — CALCITRIOL 0.25 MCG: 0.25 CAPSULE ORAL at 08:45

## 2023-02-23 RX ADMIN — Medication 10 ML: at 20:45

## 2023-02-23 RX ADMIN — METRONIDAZOLE 500 MG: 500 INJECTION, SOLUTION INTRAVENOUS at 06:53

## 2023-02-23 RX ADMIN — HYDROMORPHONE HYDROCHLORIDE 0.5 MG: 1 INJECTION, SOLUTION INTRAMUSCULAR; INTRAVENOUS; SUBCUTANEOUS at 22:43

## 2023-02-23 RX ADMIN — OXYCODONE HYDROCHLORIDE AND ACETAMINOPHEN 1 TABLET: 10; 325 TABLET ORAL at 16:51

## 2023-02-23 RX ADMIN — CALCIUM CARBONATE-VITAMIN D TAB 500 MG-200 UNIT 1 TABLET: 500-200 TAB at 08:45

## 2023-02-23 NOTE — ANESTHESIA POSTPROCEDURE EVALUATION
Patient: Stefani Watson    Procedure Summary     Date: 02/22/23 Room / Location: [facility] OR  / [facility] MAIN OR    Anesthesia Start: 1434 Anesthesia Stop: 1805    Procedure: DIAGNOSTIC LAPAROSCOPY WITH OPEN ILLEO-COLOIC RESECTION (Abdomen) Diagnosis:     Surgeons: Norma Barillas MD Provider: Collin Ortiz MD    Anesthesia Type: general ASA Status: 3          Anesthesia Type: general    Vitals  Vitals Value Taken Time   /75 02/22/23 1901   Temp 36.4 °C (97.6 °F) 02/22/23 1801   Pulse 66 02/22/23 1905   Resp 16 02/22/23 1900   SpO2 93 % 02/22/23 1905   Vitals shown include unvalidated device data.        Post Anesthesia Care and Evaluation    Patient location during evaluation: PACU  Patient participation: complete - patient participated  Level of consciousness: awake  Pain management: adequate    Airway patency: patent  Anesthetic complications: No anesthetic complications  PONV Status: none  Cardiovascular status: acceptable  Respiratory status: acceptable  Hydration status: acceptable    Comments: Patient seen and examined postoperatively; vital signs stable; SpO2 greater than or equal to 90%; cardiopulmonary status stable; nausea/vomiting adequately controlled; pain adequately controlled; no apparent anesthesia complications; patient discharged from anesthesia care when discharge criteria were met

## 2023-02-24 LAB
ALBUMIN SERPL-MCNC: 3 G/DL (ref 3.5–5.2)
ALBUMIN/GLOB SERPL: 1.3 G/DL
ALP SERPL-CCNC: 66 U/L (ref 39–117)
ALT SERPL W P-5'-P-CCNC: 5 U/L (ref 1–33)
ANION GAP SERPL CALCULATED.3IONS-SCNC: 8.9 MMOL/L (ref 5–15)
AST SERPL-CCNC: 7 U/L (ref 1–32)
BASOPHILS # BLD AUTO: 0.04 10*3/MM3 (ref 0–0.2)
BASOPHILS NFR BLD AUTO: 0.4 % (ref 0–1.5)
BILIRUB SERPL-MCNC: 0.3 MG/DL (ref 0–1.2)
BUN SERPL-MCNC: 10 MG/DL (ref 8–23)
BUN/CREAT SERPL: 8.3 (ref 7–25)
CA-I BLD-MCNC: 4.6 MG/DL (ref 4.6–5.4)
CA-I SERPL ISE-MCNC: 1.15 MMOL/L (ref 1.15–1.35)
CALCIUM SPEC-SCNC: 7.9 MG/DL (ref 8.6–10.5)
CHLORIDE SERPL-SCNC: 103 MMOL/L (ref 98–107)
CO2 SERPL-SCNC: 24.1 MMOL/L (ref 22–29)
CREAT SERPL-MCNC: 1.21 MG/DL (ref 0.57–1)
DEPRECATED RDW RBC AUTO: 48.7 FL (ref 37–54)
EGFRCR SERPLBLD CKD-EPI 2021: 47.1 ML/MIN/1.73
EOSINOPHIL # BLD AUTO: 0.02 10*3/MM3 (ref 0–0.4)
EOSINOPHIL NFR BLD AUTO: 0.2 % (ref 0.3–6.2)
ERYTHROCYTE [DISTWIDTH] IN BLOOD BY AUTOMATED COUNT: 17.1 % (ref 12.3–15.4)
GLOBULIN UR ELPH-MCNC: 2.4 GM/DL
GLUCOSE BLDC GLUCOMTR-MCNC: 140 MG/DL (ref 70–130)
GLUCOSE BLDC GLUCOMTR-MCNC: 141 MG/DL (ref 70–130)
GLUCOSE BLDC GLUCOMTR-MCNC: 141 MG/DL (ref 70–130)
GLUCOSE SERPL-MCNC: 139 MG/DL (ref 65–99)
HCT VFR BLD AUTO: 32.5 % (ref 34–46.6)
HGB BLD-MCNC: 10.2 G/DL (ref 12–15.9)
IMM GRANULOCYTES # BLD AUTO: 0.07 10*3/MM3 (ref 0–0.05)
IMM GRANULOCYTES NFR BLD AUTO: 0.7 % (ref 0–0.5)
LAB AP CASE REPORT: NORMAL
LYMPHOCYTES # BLD AUTO: 1.03 10*3/MM3 (ref 0.7–3.1)
LYMPHOCYTES NFR BLD AUTO: 10.3 % (ref 19.6–45.3)
MAGNESIUM SERPL-MCNC: 2 MG/DL (ref 1.6–2.4)
MCH RBC QN AUTO: 24.2 PG (ref 26.6–33)
MCHC RBC AUTO-ENTMCNC: 31.4 G/DL (ref 31.5–35.7)
MCV RBC AUTO: 77.2 FL (ref 79–97)
MONOCYTES # BLD AUTO: 1.58 10*3/MM3 (ref 0.1–0.9)
MONOCYTES NFR BLD AUTO: 15.8 % (ref 5–12)
NEUTROPHILS NFR BLD AUTO: 7.26 10*3/MM3 (ref 1.7–7)
NEUTROPHILS NFR BLD AUTO: 72.6 % (ref 42.7–76)
NRBC BLD AUTO-RTO: 0 /100 WBC (ref 0–0.2)
PATH REPORT.FINAL DX SPEC: NORMAL
PATH REPORT.GROSS SPEC: NORMAL
PHOSPHATE SERPL-MCNC: 3.4 MG/DL (ref 2.5–4.5)
PLATELET # BLD AUTO: 400 10*3/MM3 (ref 140–450)
PMV BLD AUTO: 9.7 FL (ref 6–12)
POTASSIUM SERPL-SCNC: 4.3 MMOL/L (ref 3.5–5.2)
PROT SERPL-MCNC: 5.4 G/DL (ref 6–8.5)
RBC # BLD AUTO: 4.21 10*6/MM3 (ref 3.77–5.28)
SODIUM SERPL-SCNC: 136 MMOL/L (ref 136–145)
WBC NRBC COR # BLD: 10 10*3/MM3 (ref 3.4–10.8)

## 2023-02-24 PROCEDURE — 82962 GLUCOSE BLOOD TEST: CPT

## 2023-02-24 PROCEDURE — 80053 COMPREHEN METABOLIC PANEL: CPT | Performed by: SURGERY

## 2023-02-24 PROCEDURE — 0 POTASSIUM CHLORIDE PER 2 MEQ: Performed by: SURGERY

## 2023-02-24 PROCEDURE — 25010000002 MAGNESIUM SULFATE PER 500 MG OF MAGNESIUM: Performed by: SURGERY

## 2023-02-24 PROCEDURE — 99024 POSTOP FOLLOW-UP VISIT: CPT | Performed by: SURGERY

## 2023-02-24 PROCEDURE — 82330 ASSAY OF CALCIUM: CPT | Performed by: SURGERY

## 2023-02-24 PROCEDURE — 83735 ASSAY OF MAGNESIUM: CPT | Performed by: SURGERY

## 2023-02-24 PROCEDURE — 85025 COMPLETE CBC W/AUTO DIFF WBC: CPT | Performed by: SURGERY

## 2023-02-24 PROCEDURE — 25010000002 CALCIUM GLUCONATE PER 10 ML: Performed by: SURGERY

## 2023-02-24 PROCEDURE — 25010000002 POTASSIUM CHLORIDE PER 2 MEQ OF POTASSIUM: Performed by: SURGERY

## 2023-02-24 PROCEDURE — 84100 ASSAY OF PHOSPHORUS: CPT | Performed by: SURGERY

## 2023-02-24 PROCEDURE — 25010000002 HYDROMORPHONE PER 4 MG: Performed by: SURGERY

## 2023-02-24 PROCEDURE — 25010000002 HYDROMORPHONE 1 MG/ML SOLUTION: Performed by: SURGERY

## 2023-02-24 RX ORDER — DOCUSATE SODIUM 100 MG/1
100 CAPSULE, LIQUID FILLED ORAL 2 TIMES DAILY
Status: DISCONTINUED | OUTPATIENT
Start: 2023-02-24 | End: 2023-03-05 | Stop reason: HOSPADM

## 2023-02-24 RX ADMIN — POTASSIUM CHLORIDE AND SODIUM CHLORIDE 50 ML/HR: 450; 150 INJECTION, SOLUTION INTRAVENOUS at 12:05

## 2023-02-24 RX ADMIN — LATANOPROST 1 DROP: 50 SOLUTION OPHTHALMIC at 21:00

## 2023-02-24 RX ADMIN — OXYCODONE HYDROCHLORIDE AND ACETAMINOPHEN 1 TABLET: 10; 325 TABLET ORAL at 20:34

## 2023-02-24 RX ADMIN — CITALOPRAM 20 MG: 20 TABLET, FILM COATED ORAL at 09:39

## 2023-02-24 RX ADMIN — MAGNESIUM OXIDE 400 MG (241.3 MG MAGNESIUM) TABLET 400 MG: TABLET at 09:39

## 2023-02-24 RX ADMIN — Medication 10 ML: at 21:00

## 2023-02-24 RX ADMIN — HYDROMORPHONE HYDROCHLORIDE 1 MG: 1 INJECTION, SOLUTION INTRAMUSCULAR; INTRAVENOUS; SUBCUTANEOUS at 02:55

## 2023-02-24 RX ADMIN — HYDROCODONE BITARTRATE AND ACETAMINOPHEN 1 TABLET: 5; 325 TABLET ORAL at 09:36

## 2023-02-24 RX ADMIN — HYDROMORPHONE HYDROCHLORIDE 1 MG: 1 INJECTION, SOLUTION INTRAMUSCULAR; INTRAVENOUS; SUBCUTANEOUS at 14:20

## 2023-02-24 RX ADMIN — DOCUSATE SODIUM 100 MG: 100 CAPSULE, LIQUID FILLED ORAL at 20:31

## 2023-02-24 RX ADMIN — CALCITRIOL 0.25 MCG: 0.25 CAPSULE ORAL at 09:39

## 2023-02-24 RX ADMIN — Medication 10 ML: at 09:40

## 2023-02-24 RX ADMIN — SIMETHICONE 80 MG: 80 TABLET, CHEWABLE ORAL at 12:12

## 2023-02-24 RX ADMIN — POTASSIUM PHOSPHATE, MONOBASIC POTASSIUM PHOSPHATE, DIBASIC: 224; 236 INJECTION, SOLUTION, CONCENTRATE INTRAVENOUS at 18:22

## 2023-02-24 RX ADMIN — LEVOTHYROXINE SODIUM 125 MCG: 0.12 TABLET ORAL at 05:57

## 2023-02-24 RX ADMIN — OXYCODONE HYDROCHLORIDE AND ACETAMINOPHEN 1 TABLET: 10; 325 TABLET ORAL at 05:57

## 2023-02-24 RX ADMIN — HYDROMORPHONE HYDROCHLORIDE 0.5 MG: 1 INJECTION, SOLUTION INTRAMUSCULAR; INTRAVENOUS; SUBCUTANEOUS at 00:44

## 2023-02-24 RX ADMIN — Medication 10 ML: at 09:39

## 2023-02-24 RX ADMIN — OXYCODONE HYDROCHLORIDE AND ACETAMINOPHEN 1 TABLET: 10; 325 TABLET ORAL at 16:37

## 2023-02-24 RX ADMIN — PRAVASTATIN SODIUM 40 MG: 40 TABLET ORAL at 20:31

## 2023-02-24 RX ADMIN — HYDROMORPHONE HYDROCHLORIDE 1 MG: 1 INJECTION, SOLUTION INTRAMUSCULAR; INTRAVENOUS; SUBCUTANEOUS at 17:17

## 2023-02-24 RX ADMIN — CALCIUM CARBONATE-VITAMIN D TAB 500 MG-200 UNIT 1 TABLET: 500-200 TAB at 09:39

## 2023-02-24 RX ADMIN — SIMETHICONE 80 MG: 80 TABLET, CHEWABLE ORAL at 20:31

## 2023-02-25 LAB
ALBUMIN SERPL-MCNC: 3.1 G/DL (ref 3.5–5.2)
ALBUMIN/GLOB SERPL: 1.1 G/DL
ALP SERPL-CCNC: 80 U/L (ref 39–117)
ALT SERPL W P-5'-P-CCNC: 6 U/L (ref 1–33)
ANION GAP SERPL CALCULATED.3IONS-SCNC: 10.3 MMOL/L (ref 5–15)
AST SERPL-CCNC: 12 U/L (ref 1–32)
BACTERIA FLD CULT: NORMAL
BASOPHILS # BLD AUTO: 0.03 10*3/MM3 (ref 0–0.2)
BASOPHILS NFR BLD AUTO: 0.3 % (ref 0–1.5)
BILIRUB SERPL-MCNC: 0.3 MG/DL (ref 0–1.2)
BUN SERPL-MCNC: 16 MG/DL (ref 8–23)
BUN/CREAT SERPL: 13.9 (ref 7–25)
CA-I BLD-MCNC: 4.7 MG/DL (ref 4.6–5.4)
CA-I SERPL ISE-MCNC: 1.18 MMOL/L (ref 1.15–1.35)
CALCIUM SPEC-SCNC: 8.2 MG/DL (ref 8.6–10.5)
CHLORIDE SERPL-SCNC: 98 MMOL/L (ref 98–107)
CO2 SERPL-SCNC: 25.7 MMOL/L (ref 22–29)
CREAT SERPL-MCNC: 1.15 MG/DL (ref 0.57–1)
DEPRECATED RDW RBC AUTO: 50.7 FL (ref 37–54)
EGFRCR SERPLBLD CKD-EPI 2021: 50.1 ML/MIN/1.73
EOSINOPHIL # BLD AUTO: 0.05 10*3/MM3 (ref 0–0.4)
EOSINOPHIL NFR BLD AUTO: 0.5 % (ref 0.3–6.2)
ERYTHROCYTE [DISTWIDTH] IN BLOOD BY AUTOMATED COUNT: 17.5 % (ref 12.3–15.4)
GLOBULIN UR ELPH-MCNC: 2.9 GM/DL
GLUCOSE BLDC GLUCOMTR-MCNC: 104 MG/DL (ref 70–130)
GLUCOSE BLDC GLUCOMTR-MCNC: 122 MG/DL (ref 70–130)
GLUCOSE BLDC GLUCOMTR-MCNC: 137 MG/DL (ref 70–130)
GLUCOSE BLDC GLUCOMTR-MCNC: 148 MG/DL (ref 70–130)
GLUCOSE SERPL-MCNC: 134 MG/DL (ref 65–99)
GRAM STN SPEC: NORMAL
GRAM STN SPEC: NORMAL
HCT VFR BLD AUTO: 32.6 % (ref 34–46.6)
HGB BLD-MCNC: 10.2 G/DL (ref 12–15.9)
IMM GRANULOCYTES # BLD AUTO: 0.05 10*3/MM3 (ref 0–0.05)
IMM GRANULOCYTES NFR BLD AUTO: 0.5 % (ref 0–0.5)
LYMPHOCYTES # BLD AUTO: 0.86 10*3/MM3 (ref 0.7–3.1)
LYMPHOCYTES NFR BLD AUTO: 8 % (ref 19.6–45.3)
MAGNESIUM SERPL-MCNC: 2.1 MG/DL (ref 1.6–2.4)
MCH RBC QN AUTO: 24.8 PG (ref 26.6–33)
MCHC RBC AUTO-ENTMCNC: 31.3 G/DL (ref 31.5–35.7)
MCV RBC AUTO: 79.3 FL (ref 79–97)
MONOCYTES # BLD AUTO: 1.27 10*3/MM3 (ref 0.1–0.9)
MONOCYTES NFR BLD AUTO: 11.8 % (ref 5–12)
NEUTROPHILS NFR BLD AUTO: 78.9 % (ref 42.7–76)
NEUTROPHILS NFR BLD AUTO: 8.48 10*3/MM3 (ref 1.7–7)
NRBC BLD AUTO-RTO: 0 /100 WBC (ref 0–0.2)
PHOSPHATE SERPL-MCNC: 2.5 MG/DL (ref 2.5–4.5)
PLATELET # BLD AUTO: 388 10*3/MM3 (ref 140–450)
PMV BLD AUTO: 10.1 FL (ref 6–12)
POTASSIUM SERPL-SCNC: 4.1 MMOL/L (ref 3.5–5.2)
PROT SERPL-MCNC: 6 G/DL (ref 6–8.5)
RBC # BLD AUTO: 4.11 10*6/MM3 (ref 3.77–5.28)
SODIUM SERPL-SCNC: 134 MMOL/L (ref 136–145)
WBC NRBC COR # BLD: 10.74 10*3/MM3 (ref 3.4–10.8)

## 2023-02-25 PROCEDURE — 82962 GLUCOSE BLOOD TEST: CPT

## 2023-02-25 PROCEDURE — 25010000002 MAGNESIUM SULFATE PER 500 MG OF MAGNESIUM: Performed by: SURGERY

## 2023-02-25 PROCEDURE — 80053 COMPREHEN METABOLIC PANEL: CPT | Performed by: SURGERY

## 2023-02-25 PROCEDURE — 99024 POSTOP FOLLOW-UP VISIT: CPT | Performed by: SURGERY

## 2023-02-25 PROCEDURE — 63710000001 DIPHENHYDRAMINE PER 50 MG: Performed by: SURGERY

## 2023-02-25 PROCEDURE — 82330 ASSAY OF CALCIUM: CPT | Performed by: SURGERY

## 2023-02-25 PROCEDURE — 84100 ASSAY OF PHOSPHORUS: CPT | Performed by: SURGERY

## 2023-02-25 PROCEDURE — 83735 ASSAY OF MAGNESIUM: CPT | Performed by: SURGERY

## 2023-02-25 PROCEDURE — 25010000002 POTASSIUM CHLORIDE PER 2 MEQ OF POTASSIUM: Performed by: SURGERY

## 2023-02-25 PROCEDURE — 25010000002 CALCIUM GLUCONATE PER 10 ML: Performed by: SURGERY

## 2023-02-25 PROCEDURE — 85025 COMPLETE CBC W/AUTO DIFF WBC: CPT | Performed by: SURGERY

## 2023-02-25 PROCEDURE — 25010000002 HYDROMORPHONE 1 MG/ML SOLUTION: Performed by: SURGERY

## 2023-02-25 PROCEDURE — 25010000002 KETOROLAC TROMETHAMINE PER 15 MG: Performed by: SURGERY

## 2023-02-25 RX ORDER — KETOROLAC TROMETHAMINE 15 MG/ML
15 INJECTION, SOLUTION INTRAMUSCULAR; INTRAVENOUS EVERY 6 HOURS
Status: DISPENSED | OUTPATIENT
Start: 2023-02-25 | End: 2023-03-02

## 2023-02-25 RX ADMIN — KETOROLAC TROMETHAMINE 15 MG: 15 INJECTION, SOLUTION INTRAMUSCULAR; INTRAVENOUS at 23:48

## 2023-02-25 RX ADMIN — SIMETHICONE 80 MG: 80 TABLET, CHEWABLE ORAL at 18:20

## 2023-02-25 RX ADMIN — CITALOPRAM 20 MG: 20 TABLET, FILM COATED ORAL at 09:09

## 2023-02-25 RX ADMIN — DIPHENHYDRAMINE HYDROCHLORIDE 25 MG: 25 CAPSULE ORAL at 18:20

## 2023-02-25 RX ADMIN — KETOROLAC TROMETHAMINE 15 MG: 15 INJECTION, SOLUTION INTRAMUSCULAR; INTRAVENOUS at 18:20

## 2023-02-25 RX ADMIN — FAMOTIDINE: 10 INJECTION INTRAVENOUS at 18:19

## 2023-02-25 RX ADMIN — CALCITRIOL 0.25 MCG: 0.25 CAPSULE ORAL at 09:09

## 2023-02-25 RX ADMIN — DOCUSATE SODIUM 100 MG: 100 CAPSULE, LIQUID FILLED ORAL at 20:40

## 2023-02-25 RX ADMIN — MAGNESIUM OXIDE 400 MG (241.3 MG MAGNESIUM) TABLET 400 MG: TABLET at 09:09

## 2023-02-25 RX ADMIN — DIPHENHYDRAMINE HYDROCHLORIDE 25 MG: 25 CAPSULE ORAL at 10:59

## 2023-02-25 RX ADMIN — Medication 10 ML: at 20:49

## 2023-02-25 RX ADMIN — OXYCODONE HYDROCHLORIDE AND ACETAMINOPHEN 1 TABLET: 10; 325 TABLET ORAL at 20:41

## 2023-02-25 RX ADMIN — KETOROLAC TROMETHAMINE 15 MG: 15 INJECTION, SOLUTION INTRAMUSCULAR; INTRAVENOUS at 12:45

## 2023-02-25 RX ADMIN — HYDROMORPHONE HYDROCHLORIDE 1 MG: 1 INJECTION, SOLUTION INTRAMUSCULAR; INTRAVENOUS; SUBCUTANEOUS at 02:37

## 2023-02-25 RX ADMIN — Medication 10 ML: at 09:09

## 2023-02-25 RX ADMIN — OXYCODONE HYDROCHLORIDE AND ACETAMINOPHEN 1 TABLET: 10; 325 TABLET ORAL at 06:44

## 2023-02-25 RX ADMIN — LEVOTHYROXINE SODIUM 125 MCG: 0.12 TABLET ORAL at 05:35

## 2023-02-25 RX ADMIN — LATANOPROST 1 DROP: 50 SOLUTION OPHTHALMIC at 20:41

## 2023-02-25 RX ADMIN — Medication 10 ML: at 09:10

## 2023-02-25 RX ADMIN — CALCIUM CARBONATE-VITAMIN D TAB 500 MG-200 UNIT 1 TABLET: 500-200 TAB at 09:09

## 2023-02-25 RX ADMIN — DOCUSATE SODIUM 100 MG: 100 CAPSULE, LIQUID FILLED ORAL at 09:09

## 2023-02-25 RX ADMIN — HYDROMORPHONE HYDROCHLORIDE 1 MG: 1 INJECTION, SOLUTION INTRAMUSCULAR; INTRAVENOUS; SUBCUTANEOUS at 05:35

## 2023-02-25 RX ADMIN — Medication 10 ML: at 20:48

## 2023-02-25 RX ADMIN — SMOFLIPID 100 ML: 6; 6; 5; 3 INJECTION, EMULSION INTRAVENOUS at 18:20

## 2023-02-25 RX ADMIN — OXYCODONE HYDROCHLORIDE AND ACETAMINOPHEN 1 TABLET: 10; 325 TABLET ORAL at 10:59

## 2023-02-25 RX ADMIN — OXYCODONE HYDROCHLORIDE AND ACETAMINOPHEN 1 TABLET: 10; 325 TABLET ORAL at 15:48

## 2023-02-25 RX ADMIN — PRAVASTATIN SODIUM 40 MG: 40 TABLET ORAL at 20:41

## 2023-02-26 LAB
ANION GAP SERPL CALCULATED.3IONS-SCNC: 5.4 MMOL/L (ref 5–15)
BASOPHILS # BLD AUTO: 0.05 10*3/MM3 (ref 0–0.2)
BASOPHILS NFR BLD AUTO: 0.6 % (ref 0–1.5)
BUN SERPL-MCNC: 23 MG/DL (ref 8–23)
BUN/CREAT SERPL: 18.1 (ref 7–25)
CALCIUM SPEC-SCNC: 8.3 MG/DL (ref 8.6–10.5)
CHLORIDE SERPL-SCNC: 102 MMOL/L (ref 98–107)
CO2 SERPL-SCNC: 25.6 MMOL/L (ref 22–29)
CREAT SERPL-MCNC: 1.27 MG/DL (ref 0.57–1)
DEPRECATED RDW RBC AUTO: 49.2 FL (ref 37–54)
EGFRCR SERPLBLD CKD-EPI 2021: 44.5 ML/MIN/1.73
EOSINOPHIL # BLD AUTO: 0.21 10*3/MM3 (ref 0–0.4)
EOSINOPHIL NFR BLD AUTO: 2.4 % (ref 0.3–6.2)
ERYTHROCYTE [DISTWIDTH] IN BLOOD BY AUTOMATED COUNT: 17.4 % (ref 12.3–15.4)
GLUCOSE BLDC GLUCOMTR-MCNC: 137 MG/DL (ref 70–130)
GLUCOSE BLDC GLUCOMTR-MCNC: 91 MG/DL (ref 70–130)
GLUCOSE BLDC GLUCOMTR-MCNC: 94 MG/DL (ref 70–130)
GLUCOSE BLDC GLUCOMTR-MCNC: 99 MG/DL (ref 70–130)
GLUCOSE SERPL-MCNC: 107 MG/DL (ref 65–99)
HCT VFR BLD AUTO: 28.6 % (ref 34–46.6)
HGB BLD-MCNC: 8.8 G/DL (ref 12–15.9)
IMM GRANULOCYTES # BLD AUTO: 0.05 10*3/MM3 (ref 0–0.05)
IMM GRANULOCYTES NFR BLD AUTO: 0.6 % (ref 0–0.5)
LYMPHOCYTES # BLD AUTO: 1.39 10*3/MM3 (ref 0.7–3.1)
LYMPHOCYTES NFR BLD AUTO: 15.8 % (ref 19.6–45.3)
MAGNESIUM SERPL-MCNC: 2.1 MG/DL (ref 1.6–2.4)
MCH RBC QN AUTO: 24 PG (ref 26.6–33)
MCHC RBC AUTO-ENTMCNC: 30.8 G/DL (ref 31.5–35.7)
MCV RBC AUTO: 77.9 FL (ref 79–97)
MONOCYTES # BLD AUTO: 1.03 10*3/MM3 (ref 0.1–0.9)
MONOCYTES NFR BLD AUTO: 11.7 % (ref 5–12)
NEUTROPHILS NFR BLD AUTO: 6.05 10*3/MM3 (ref 1.7–7)
NEUTROPHILS NFR BLD AUTO: 68.9 % (ref 42.7–76)
NRBC BLD AUTO-RTO: 0 /100 WBC (ref 0–0.2)
PHOSPHATE SERPL-MCNC: 3.3 MG/DL (ref 2.5–4.5)
PLATELET # BLD AUTO: 346 10*3/MM3 (ref 140–450)
PMV BLD AUTO: 9.8 FL (ref 6–12)
POTASSIUM SERPL-SCNC: 4.3 MMOL/L (ref 3.5–5.2)
RBC # BLD AUTO: 3.67 10*6/MM3 (ref 3.77–5.28)
SODIUM SERPL-SCNC: 133 MMOL/L (ref 136–145)
WBC NRBC COR # BLD: 8.78 10*3/MM3 (ref 3.4–10.8)

## 2023-02-26 PROCEDURE — 83735 ASSAY OF MAGNESIUM: CPT | Performed by: SURGERY

## 2023-02-26 PROCEDURE — 80048 BASIC METABOLIC PNL TOTAL CA: CPT | Performed by: SURGERY

## 2023-02-26 PROCEDURE — 99024 POSTOP FOLLOW-UP VISIT: CPT | Performed by: SURGERY

## 2023-02-26 PROCEDURE — 25010000002 CALCIUM GLUCONATE PER 10 ML: Performed by: SURGERY

## 2023-02-26 PROCEDURE — 63710000001 ONDANSETRON PER 8 MG: Performed by: SURGERY

## 2023-02-26 PROCEDURE — 25010000002 KETOROLAC TROMETHAMINE PER 15 MG: Performed by: SURGERY

## 2023-02-26 PROCEDURE — 25010000002 MAGNESIUM SULFATE PER 500 MG OF MAGNESIUM: Performed by: SURGERY

## 2023-02-26 PROCEDURE — 85025 COMPLETE CBC W/AUTO DIFF WBC: CPT | Performed by: SURGERY

## 2023-02-26 PROCEDURE — 25010000002 POTASSIUM CHLORIDE PER 2 MEQ OF POTASSIUM: Performed by: SURGERY

## 2023-02-26 PROCEDURE — 63710000001 DIPHENHYDRAMINE PER 50 MG: Performed by: SURGERY

## 2023-02-26 PROCEDURE — 84100 ASSAY OF PHOSPHORUS: CPT | Performed by: SURGERY

## 2023-02-26 PROCEDURE — 82962 GLUCOSE BLOOD TEST: CPT

## 2023-02-26 RX ADMIN — Medication 10 ML: at 08:52

## 2023-02-26 RX ADMIN — Medication 10 ML: at 21:03

## 2023-02-26 RX ADMIN — DOCUSATE SODIUM 100 MG: 100 CAPSULE, LIQUID FILLED ORAL at 21:03

## 2023-02-26 RX ADMIN — MAGNESIUM OXIDE 400 MG (241.3 MG MAGNESIUM) TABLET 400 MG: TABLET at 08:52

## 2023-02-26 RX ADMIN — ONDANSETRON HYDROCHLORIDE 4 MG: 4 TABLET, FILM COATED ORAL at 17:34

## 2023-02-26 RX ADMIN — FAMOTIDINE: 10 INJECTION INTRAVENOUS at 17:59

## 2023-02-26 RX ADMIN — OXYCODONE HYDROCHLORIDE AND ACETAMINOPHEN 1 TABLET: 10; 325 TABLET ORAL at 11:30

## 2023-02-26 RX ADMIN — CALCITRIOL 0.25 MCG: 0.25 CAPSULE ORAL at 08:52

## 2023-02-26 RX ADMIN — OXYCODONE HYDROCHLORIDE AND ACETAMINOPHEN 1 TABLET: 10; 325 TABLET ORAL at 16:21

## 2023-02-26 RX ADMIN — DIPHENHYDRAMINE HYDROCHLORIDE 25 MG: 25 CAPSULE ORAL at 11:30

## 2023-02-26 RX ADMIN — Medication 10 ML: at 21:04

## 2023-02-26 RX ADMIN — LEVOTHYROXINE SODIUM 125 MCG: 0.12 TABLET ORAL at 06:13

## 2023-02-26 RX ADMIN — DIPHENHYDRAMINE HYDROCHLORIDE 25 MG: 25 CAPSULE ORAL at 17:59

## 2023-02-26 RX ADMIN — PRAVASTATIN SODIUM 40 MG: 40 TABLET ORAL at 21:04

## 2023-02-26 RX ADMIN — SMOFLIPID 100 ML: 6; 6; 5; 3 INJECTION, EMULSION INTRAVENOUS at 17:59

## 2023-02-26 RX ADMIN — OXYCODONE HYDROCHLORIDE AND ACETAMINOPHEN 1 TABLET: 10; 325 TABLET ORAL at 04:06

## 2023-02-26 RX ADMIN — DOCUSATE SODIUM 100 MG: 100 CAPSULE, LIQUID FILLED ORAL at 08:51

## 2023-02-26 RX ADMIN — CITALOPRAM 20 MG: 20 TABLET, FILM COATED ORAL at 08:52

## 2023-02-26 RX ADMIN — CALCIUM CARBONATE-VITAMIN D TAB 500 MG-200 UNIT 1 TABLET: 500-200 TAB at 08:52

## 2023-02-26 RX ADMIN — LATANOPROST 1 DROP: 50 SOLUTION OPHTHALMIC at 21:03

## 2023-02-26 RX ADMIN — KETOROLAC TROMETHAMINE 15 MG: 15 INJECTION, SOLUTION INTRAMUSCULAR; INTRAVENOUS at 06:13

## 2023-02-26 RX ADMIN — OXYCODONE HYDROCHLORIDE AND ACETAMINOPHEN 1 TABLET: 10; 325 TABLET ORAL at 21:07

## 2023-02-26 RX ADMIN — KETOROLAC TROMETHAMINE 15 MG: 15 INJECTION, SOLUTION INTRAMUSCULAR; INTRAVENOUS at 17:59

## 2023-02-26 RX ADMIN — KETOROLAC TROMETHAMINE 15 MG: 15 INJECTION, SOLUTION INTRAMUSCULAR; INTRAVENOUS at 13:22

## 2023-02-27 ENCOUNTER — APPOINTMENT (OUTPATIENT)
Dept: GENERAL RADIOLOGY | Facility: HOSPITAL | Age: 73
DRG: 330 | End: 2023-02-27
Payer: MEDICARE

## 2023-02-27 LAB
ALBUMIN SERPL-MCNC: 2.8 G/DL (ref 3.5–5.2)
ALBUMIN/GLOB SERPL: 1 G/DL
ALP SERPL-CCNC: 79 U/L (ref 39–117)
ALT SERPL W P-5'-P-CCNC: 6 U/L (ref 1–33)
ANION GAP SERPL CALCULATED.3IONS-SCNC: 6.5 MMOL/L (ref 5–15)
AST SERPL-CCNC: 8 U/L (ref 1–32)
BACTERIA SPEC AEROBE CULT: NORMAL
BACTERIA SPEC AEROBE CULT: NORMAL
BILIRUB CONJ SERPL-MCNC: <0.2 MG/DL (ref 0–0.3)
BILIRUB SERPL-MCNC: <0.2 MG/DL (ref 0–1.2)
BUN SERPL-MCNC: 22 MG/DL (ref 8–23)
BUN/CREAT SERPL: 17.2 (ref 7–25)
CA-I BLD-MCNC: 5 MG/DL (ref 4.6–5.4)
CA-I SERPL ISE-MCNC: 1.26 MMOL/L (ref 1.15–1.35)
CALCIUM SPEC-SCNC: 8.6 MG/DL (ref 8.6–10.5)
CHLORIDE SERPL-SCNC: 103 MMOL/L (ref 98–107)
CO2 SERPL-SCNC: 26.5 MMOL/L (ref 22–29)
CREAT SERPL-MCNC: 1.28 MG/DL (ref 0.57–1)
CRP SERPL-MCNC: 5.25 MG/DL (ref 0–0.5)
EGFRCR SERPLBLD CKD-EPI 2021: 44.1 ML/MIN/1.73
GLOBULIN UR ELPH-MCNC: 2.7 GM/DL
GLUCOSE BLDC GLUCOMTR-MCNC: 109 MG/DL (ref 70–130)
GLUCOSE BLDC GLUCOMTR-MCNC: 139 MG/DL (ref 70–130)
GLUCOSE SERPL-MCNC: 119 MG/DL (ref 65–99)
MAGNESIUM SERPL-MCNC: 2 MG/DL (ref 1.6–2.4)
PHOSPHATE SERPL-MCNC: 4.2 MG/DL (ref 2.5–4.5)
POTASSIUM SERPL-SCNC: 4.6 MMOL/L (ref 3.5–5.2)
PREALB SERPL-MCNC: 8 MG/DL (ref 20–40)
PROT SERPL-MCNC: 5.5 G/DL (ref 6–8.5)
SODIUM SERPL-SCNC: 136 MMOL/L (ref 136–145)
WHOLE BLOOD HOLD SPECIMEN: NORMAL

## 2023-02-27 PROCEDURE — 99024 POSTOP FOLLOW-UP VISIT: CPT | Performed by: SURGERY

## 2023-02-27 PROCEDURE — 86140 C-REACTIVE PROTEIN: CPT | Performed by: SURGERY

## 2023-02-27 PROCEDURE — 80053 COMPREHEN METABOLIC PANEL: CPT | Performed by: SURGERY

## 2023-02-27 PROCEDURE — 25010000002 KETOROLAC TROMETHAMINE PER 15 MG: Performed by: SURGERY

## 2023-02-27 PROCEDURE — 82248 BILIRUBIN DIRECT: CPT | Performed by: SURGERY

## 2023-02-27 PROCEDURE — 84134 ASSAY OF PREALBUMIN: CPT | Performed by: SURGERY

## 2023-02-27 PROCEDURE — 25010000002 PROCHLORPERAZINE 10 MG/2ML SOLUTION: Performed by: SURGERY

## 2023-02-27 PROCEDURE — 82962 GLUCOSE BLOOD TEST: CPT

## 2023-02-27 PROCEDURE — 83735 ASSAY OF MAGNESIUM: CPT | Performed by: SURGERY

## 2023-02-27 PROCEDURE — 63710000001 DIPHENHYDRAMINE PER 50 MG: Performed by: SURGERY

## 2023-02-27 PROCEDURE — 74019 RADEX ABDOMEN 2 VIEWS: CPT

## 2023-02-27 PROCEDURE — 25010000002 ONDANSETRON PER 1 MG: Performed by: SURGERY

## 2023-02-27 PROCEDURE — 84100 ASSAY OF PHOSPHORUS: CPT | Performed by: SURGERY

## 2023-02-27 PROCEDURE — 82330 ASSAY OF CALCIUM: CPT | Performed by: SURGERY

## 2023-02-27 RX ORDER — PROCHLORPERAZINE EDISYLATE 5 MG/ML
5 INJECTION INTRAMUSCULAR; INTRAVENOUS EVERY 4 HOURS PRN
Status: DISCONTINUED | OUTPATIENT
Start: 2023-02-27 | End: 2023-03-03

## 2023-02-27 RX ADMIN — KETOROLAC TROMETHAMINE 15 MG: 15 INJECTION, SOLUTION INTRAMUSCULAR; INTRAVENOUS at 17:13

## 2023-02-27 RX ADMIN — OXYCODONE HYDROCHLORIDE AND ACETAMINOPHEN 1 TABLET: 10; 325 TABLET ORAL at 20:13

## 2023-02-27 RX ADMIN — CALCITRIOL 0.25 MCG: 0.25 CAPSULE ORAL at 09:05

## 2023-02-27 RX ADMIN — MAGNESIUM OXIDE 400 MG (241.3 MG MAGNESIUM) TABLET 400 MG: TABLET at 09:05

## 2023-02-27 RX ADMIN — PROCHLORPERAZINE EDISYLATE 5 MG: 5 INJECTION INTRAMUSCULAR; INTRAVENOUS at 20:13

## 2023-02-27 RX ADMIN — DIPHENHYDRAMINE HYDROCHLORIDE 25 MG: 25 CAPSULE ORAL at 00:57

## 2023-02-27 RX ADMIN — DOCUSATE SODIUM 100 MG: 100 CAPSULE, LIQUID FILLED ORAL at 09:05

## 2023-02-27 RX ADMIN — ONDANSETRON 4 MG: 2 INJECTION INTRAMUSCULAR; INTRAVENOUS at 16:26

## 2023-02-27 RX ADMIN — DIPHENHYDRAMINE HYDROCHLORIDE 25 MG: 25 CAPSULE ORAL at 06:26

## 2023-02-27 RX ADMIN — Medication 10 ML: at 09:09

## 2023-02-27 RX ADMIN — KETOROLAC TROMETHAMINE 15 MG: 15 INJECTION, SOLUTION INTRAMUSCULAR; INTRAVENOUS at 00:58

## 2023-02-27 RX ADMIN — LEVOTHYROXINE SODIUM 125 MCG: 0.12 TABLET ORAL at 06:26

## 2023-02-27 RX ADMIN — PRAVASTATIN SODIUM 40 MG: 40 TABLET ORAL at 20:13

## 2023-02-27 RX ADMIN — KETOROLAC TROMETHAMINE 15 MG: 15 INJECTION, SOLUTION INTRAMUSCULAR; INTRAVENOUS at 06:26

## 2023-02-27 RX ADMIN — KETOROLAC TROMETHAMINE 15 MG: 15 INJECTION, SOLUTION INTRAMUSCULAR; INTRAVENOUS at 12:46

## 2023-02-27 RX ADMIN — CITALOPRAM 20 MG: 20 TABLET, FILM COATED ORAL at 09:05

## 2023-02-27 RX ADMIN — CALCIUM CARBONATE-VITAMIN D TAB 500 MG-200 UNIT 1 TABLET: 500-200 TAB at 09:05

## 2023-02-27 RX ADMIN — Medication 10 ML: at 20:14

## 2023-02-27 RX ADMIN — LATANOPROST 1 DROP: 50 SOLUTION OPHTHALMIC at 20:14

## 2023-02-27 RX ADMIN — DIPHENHYDRAMINE HYDROCHLORIDE 25 MG: 25 CAPSULE ORAL at 12:46

## 2023-02-28 PROCEDURE — 25010000002 KETOROLAC TROMETHAMINE PER 15 MG: Performed by: SURGERY

## 2023-02-28 PROCEDURE — 99024 POSTOP FOLLOW-UP VISIT: CPT | Performed by: SURGERY

## 2023-02-28 RX ADMIN — LATANOPROST 1 DROP: 50 SOLUTION OPHTHALMIC at 20:30

## 2023-02-28 RX ADMIN — Medication 10 ML: at 09:33

## 2023-02-28 RX ADMIN — MAGNESIUM OXIDE 400 MG (241.3 MG MAGNESIUM) TABLET 400 MG: TABLET at 09:31

## 2023-02-28 RX ADMIN — OXYCODONE HYDROCHLORIDE AND ACETAMINOPHEN 1 TABLET: 10; 325 TABLET ORAL at 14:24

## 2023-02-28 RX ADMIN — KETOROLAC TROMETHAMINE 15 MG: 15 INJECTION, SOLUTION INTRAMUSCULAR; INTRAVENOUS at 12:47

## 2023-02-28 RX ADMIN — CITALOPRAM 20 MG: 20 TABLET, FILM COATED ORAL at 09:31

## 2023-02-28 RX ADMIN — Medication 10 ML: at 20:31

## 2023-02-28 RX ADMIN — Medication 10 ML: at 20:30

## 2023-02-28 RX ADMIN — LEVOTHYROXINE SODIUM 125 MCG: 0.12 TABLET ORAL at 05:09

## 2023-02-28 RX ADMIN — KETOROLAC TROMETHAMINE 15 MG: 15 INJECTION, SOLUTION INTRAMUSCULAR; INTRAVENOUS at 05:09

## 2023-02-28 RX ADMIN — CALCIUM CARBONATE-VITAMIN D TAB 500 MG-200 UNIT 1 TABLET: 500-200 TAB at 09:31

## 2023-02-28 RX ADMIN — DOCUSATE SODIUM 100 MG: 100 CAPSULE, LIQUID FILLED ORAL at 09:33

## 2023-02-28 RX ADMIN — CALCITRIOL 0.25 MCG: 0.25 CAPSULE ORAL at 09:31

## 2023-02-28 RX ADMIN — PRAVASTATIN SODIUM 40 MG: 40 TABLET ORAL at 20:30

## 2023-02-28 RX ADMIN — DOCUSATE SODIUM 100 MG: 100 CAPSULE, LIQUID FILLED ORAL at 20:30

## 2023-02-28 RX ADMIN — KETOROLAC TROMETHAMINE 15 MG: 15 INJECTION, SOLUTION INTRAMUSCULAR; INTRAVENOUS at 18:13

## 2023-03-01 ENCOUNTER — APPOINTMENT (OUTPATIENT)
Dept: GENERAL RADIOLOGY | Facility: HOSPITAL | Age: 73
DRG: 330 | End: 2023-03-01
Payer: MEDICARE

## 2023-03-01 LAB
ANION GAP SERPL CALCULATED.3IONS-SCNC: 9.9 MMOL/L (ref 5–15)
BUN SERPL-MCNC: 15 MG/DL (ref 8–23)
BUN/CREAT SERPL: 12.9 (ref 7–25)
CALCIUM SPEC-SCNC: 7.6 MG/DL (ref 8.6–10.5)
CHLORIDE SERPL-SCNC: 104 MMOL/L (ref 98–107)
CO2 SERPL-SCNC: 25.1 MMOL/L (ref 22–29)
CREAT SERPL-MCNC: 1.16 MG/DL (ref 0.57–1)
EGFRCR SERPLBLD CKD-EPI 2021: 49.6 ML/MIN/1.73
GLUCOSE SERPL-MCNC: 92 MG/DL (ref 65–99)
POTASSIUM SERPL-SCNC: 4.4 MMOL/L (ref 3.5–5.2)
SODIUM SERPL-SCNC: 139 MMOL/L (ref 136–145)
WHOLE BLOOD HOLD SPECIMEN: NORMAL

## 2023-03-01 PROCEDURE — 99024 POSTOP FOLLOW-UP VISIT: CPT | Performed by: SURGERY

## 2023-03-01 PROCEDURE — 25010000002 PROCHLORPERAZINE 10 MG/2ML SOLUTION: Performed by: SURGERY

## 2023-03-01 PROCEDURE — 74018 RADEX ABDOMEN 1 VIEW: CPT

## 2023-03-01 PROCEDURE — 25010000002 KETOROLAC TROMETHAMINE PER 15 MG: Performed by: SURGERY

## 2023-03-01 PROCEDURE — 80048 BASIC METABOLIC PNL TOTAL CA: CPT | Performed by: SURGERY

## 2023-03-01 PROCEDURE — 25010000002 ONDANSETRON PER 1 MG: Performed by: SURGERY

## 2023-03-01 RX ADMIN — CALCITRIOL 0.25 MCG: 0.25 CAPSULE ORAL at 09:19

## 2023-03-01 RX ADMIN — CITALOPRAM 20 MG: 20 TABLET, FILM COATED ORAL at 09:19

## 2023-03-01 RX ADMIN — Medication 10 ML: at 09:19

## 2023-03-01 RX ADMIN — PRAVASTATIN SODIUM 40 MG: 40 TABLET ORAL at 21:16

## 2023-03-01 RX ADMIN — Medication 10 ML: at 21:16

## 2023-03-01 RX ADMIN — ONDANSETRON 4 MG: 2 INJECTION INTRAMUSCULAR; INTRAVENOUS at 00:02

## 2023-03-01 RX ADMIN — KETOROLAC TROMETHAMINE 15 MG: 15 INJECTION, SOLUTION INTRAMUSCULAR; INTRAVENOUS at 17:29

## 2023-03-01 RX ADMIN — KETOROLAC TROMETHAMINE 15 MG: 15 INJECTION, SOLUTION INTRAMUSCULAR; INTRAVENOUS at 06:11

## 2023-03-01 RX ADMIN — LATANOPROST 1 DROP: 50 SOLUTION OPHTHALMIC at 21:16

## 2023-03-01 RX ADMIN — OXYCODONE HYDROCHLORIDE AND ACETAMINOPHEN 1 TABLET: 10; 325 TABLET ORAL at 21:16

## 2023-03-01 RX ADMIN — PROCHLORPERAZINE EDISYLATE 5 MG: 5 INJECTION INTRAMUSCULAR; INTRAVENOUS at 21:15

## 2023-03-01 RX ADMIN — KETOROLAC TROMETHAMINE 15 MG: 15 INJECTION, SOLUTION INTRAMUSCULAR; INTRAVENOUS at 12:03

## 2023-03-01 RX ADMIN — MAGNESIUM OXIDE 400 MG (241.3 MG MAGNESIUM) TABLET 400 MG: TABLET at 09:19

## 2023-03-01 RX ADMIN — ONDANSETRON 4 MG: 2 INJECTION INTRAMUSCULAR; INTRAVENOUS at 12:03

## 2023-03-01 RX ADMIN — KETOROLAC TROMETHAMINE 15 MG: 15 INJECTION, SOLUTION INTRAMUSCULAR; INTRAVENOUS at 00:02

## 2023-03-01 RX ADMIN — DOCUSATE SODIUM 100 MG: 100 CAPSULE, LIQUID FILLED ORAL at 09:19

## 2023-03-01 RX ADMIN — LEVOTHYROXINE SODIUM 125 MCG: 0.12 TABLET ORAL at 06:11

## 2023-03-01 RX ADMIN — CALCIUM CARBONATE-VITAMIN D TAB 500 MG-200 UNIT 1 TABLET: 500-200 TAB at 09:19

## 2023-03-01 RX ADMIN — ONDANSETRON 4 MG: 2 INJECTION INTRAMUSCULAR; INTRAVENOUS at 17:29

## 2023-03-01 NOTE — PLAN OF CARE
Goal Outcome Evaluation:           Progress: no change  Outcome Evaluation: C/O nausea--zofran x2 and scheduled toradol. Ambulating around unit multiple times. BMx1. Xray in the morning. Will cont to monitor

## 2023-03-01 NOTE — PLAN OF CARE
Goal Outcome Evaluation:  Plan of Care Reviewed With: patient        Progress: no change  Outcome Evaluation: Patient given scheduled Tordol for abdominal pain and requested one dose of IV Zofran for nausea. Patient ambulating frequently before going to bed. O2 at 2lpm overnight, VSS, will continue to monitor.

## 2023-03-01 NOTE — PROGRESS NOTES
"General Surgery  Progress Note    CC: Follow-up small bowel obstruction after recent right colon resection for colon cancer    POD#7 open ileocecectomy  POD#20 laparoscopic right colon resection    S: She had no vomiting last night, and felt better when downgraded to a full liquid diet.  She has been ambulating multiple times in the halls and continues to pass flatus and have liquid bowel movements (4 yesterday).    O:/72 (BP Location: Left arm, Patient Position: Lying)   Pulse 67   Temp 97 °F (36.1 °C) (Oral)   Resp 16   Ht 165.1 cm (65\")   Wt 94.8 kg (208 lb 15.9 oz) Comment: WITHOUT SCD MACHINE  LMP  (LMP Unknown)   SpO2 97%   BMI 34.78 kg/m²     Intake & Output:  UOP: Void x6  BM x4    GENERAL: alert, well appearing, and in no distress  HEENT: normocephalic, atraumatic, moist mucous membranes, clear sclerae   CHEST: clear to auscultation, no wheezes, rales or rhonchi, symmetric air entry  CARDIAC: regular rate and rhythm    ABDOMEN: Soft, incisions clean/dry/intact with staples  EXTREMITIES: no cyanosis, clubbing, or edema   SKIN: Warm and moist, no rashes    LABS  Results from last 7 days   Lab Units 02/26/23  0455 02/25/23  0643 02/24/23  0604   WBC 10*3/mm3 8.78 10.74 10.00   HEMOGLOBIN g/dL 8.8* 10.2* 10.2*   HEMATOCRIT % 28.6* 32.6* 32.5*   PLATELETS 10*3/mm3 346 388 400     Results from last 7 days   Lab Units 03/01/23  0709 02/27/23  0545 02/26/23  0455 02/25/23  0643 02/24/23  0604   SODIUM mmol/L 139 136 133* 134* 136   POTASSIUM mmol/L 4.4 4.6 4.3 4.1 4.3   CHLORIDE mmol/L 104 103 102 98 103   CO2 mmol/L 25.1 26.5 25.6 25.7 24.1   BUN mg/dL 15 22 23 16 10   CREATININE mg/dL 1.16* 1.28* 1.27* 1.15* 1.21*   CALCIUM mg/dL 7.6* 8.6 8.3* 8.2* 7.9*   BILIRUBIN mg/dL  --  <0.2  --  0.3 0.3   ALK PHOS U/L  --  79  --  80 66   ALT (SGPT) U/L  --  6  --  6 5   AST (SGOT) U/L  --  8  --  12 7   GLUCOSE mg/dL 92 119* 107* 134* 139*             A/P: 72 y.o. female POD#7 open ileocecectomy, POD#20 " laparoscopic right colon resection    I reviewed her KUB today which shows a persistent postoperative ileus versus partial small bowel obstruction.  Her bowel function is slowly returning to normal.  I encouraged her to keep walking as much as she is able.  I will repeat a KUB tomorrow to monitor her bowel gas pattern, and if any worse will then plan on checking a CT scan for further work-up.    Eleni Torre MD  General, Robotic, and Endoscopic Surgery  [facility]    [address], [address]  [place] [address]  P: [phone]  F: [phone]

## 2023-03-02 ENCOUNTER — APPOINTMENT (OUTPATIENT)
Dept: GENERAL RADIOLOGY | Facility: HOSPITAL | Age: 73
DRG: 330 | End: 2023-03-02
Payer: MEDICARE

## 2023-03-02 PROCEDURE — 25010000002 ONDANSETRON PER 1 MG: Performed by: SURGERY

## 2023-03-02 PROCEDURE — 74018 RADEX ABDOMEN 1 VIEW: CPT

## 2023-03-02 PROCEDURE — 99024 POSTOP FOLLOW-UP VISIT: CPT | Performed by: SURGERY

## 2023-03-02 PROCEDURE — 25010000002 KETOROLAC TROMETHAMINE PER 15 MG: Performed by: SURGERY

## 2023-03-02 RX ADMIN — CALCIUM CARBONATE-VITAMIN D TAB 500 MG-200 UNIT 1 TABLET: 500-200 TAB at 08:56

## 2023-03-02 RX ADMIN — Medication 10 ML: at 21:40

## 2023-03-02 RX ADMIN — ONDANSETRON 4 MG: 2 INJECTION INTRAMUSCULAR; INTRAVENOUS at 09:02

## 2023-03-02 RX ADMIN — CALCITRIOL 0.25 MCG: 0.25 CAPSULE ORAL at 08:56

## 2023-03-02 RX ADMIN — OXYCODONE HYDROCHLORIDE AND ACETAMINOPHEN 1 TABLET: 10; 325 TABLET ORAL at 01:24

## 2023-03-02 RX ADMIN — MAGNESIUM OXIDE 400 MG (241.3 MG MAGNESIUM) TABLET 400 MG: TABLET at 08:56

## 2023-03-02 RX ADMIN — KETOROLAC TROMETHAMINE 15 MG: 15 INJECTION, SOLUTION INTRAMUSCULAR; INTRAVENOUS at 00:32

## 2023-03-02 RX ADMIN — LATANOPROST 1 DROP: 50 SOLUTION OPHTHALMIC at 21:41

## 2023-03-02 RX ADMIN — OXYCODONE HYDROCHLORIDE AND ACETAMINOPHEN 1 TABLET: 10; 325 TABLET ORAL at 22:43

## 2023-03-02 RX ADMIN — PRAVASTATIN SODIUM 40 MG: 40 TABLET ORAL at 21:40

## 2023-03-02 RX ADMIN — LEVOTHYROXINE SODIUM 125 MCG: 0.12 TABLET ORAL at 06:09

## 2023-03-02 RX ADMIN — Medication 10 ML: at 08:57

## 2023-03-02 RX ADMIN — Medication 10 ML: at 08:56

## 2023-03-02 RX ADMIN — ONDANSETRON 4 MG: 2 INJECTION INTRAMUSCULAR; INTRAVENOUS at 16:13

## 2023-03-02 RX ADMIN — KETOROLAC TROMETHAMINE 15 MG: 15 INJECTION, SOLUTION INTRAMUSCULAR; INTRAVENOUS at 06:09

## 2023-03-02 RX ADMIN — CITALOPRAM 20 MG: 20 TABLET, FILM COATED ORAL at 08:56

## 2023-03-02 NOTE — PLAN OF CARE
Goal Outcome Evaluation:              Outcome Evaluation: Patient a/o x4, coopertive with care this shift. up ad manuel, cont b/b, tolerating full liquid diet. Pt on RA, but placed on 2L o2 during sleep due to decreased o2 sats. PRN pain medication and antiemetics given as needed/ordered. All meds given as ordered. No new issues noted. See v/s and labs.

## 2023-03-02 NOTE — PROGRESS NOTES
"General Surgery  Progress Note    CC: Follow-up small bowel obstruction after recent right colon resection for colon cancer    POD#8 open ileocecectomy  POD#21 laparoscopic right colon resection    S: She had a small-volume emesis this morning of 100 cc when she choked on a pill.  She had 2 bowel movements last night and continues to pass flatus.    O:/72 (BP Location: Left arm, Patient Position: Lying)   Pulse 56   Temp 97.1 °F (36.2 °C) (Oral)   Resp 16   Ht 165.1 cm (65\")   Wt 94.8 kg (208 lb 15.9 oz) Comment: WITHOUT SCD MACHINE  LMP  (LMP Unknown)   SpO2 96%   BMI 34.78 kg/m²     Intake & Output:  UOP: Void x2  BM x2    GENERAL: alert, well appearing, and in no distress  HEENT: normocephalic, atraumatic, moist mucous membranes, clear sclerae   CHEST: clear to auscultation, no wheezes, rales or rhonchi, symmetric air entry  CARDIAC: regular rate and rhythm    ABDOMEN: Soft, incisions clean/dry/intact with staples  EXTREMITIES: no cyanosis, clubbing, or edema   SKIN: Warm and moist, no rashes    LABS  Results from last 7 days   Lab Units 02/26/23  0455 02/25/23  0643 02/24/23  0604   WBC 10*3/mm3 8.78 10.74 10.00   HEMOGLOBIN g/dL 8.8* 10.2* 10.2*   HEMATOCRIT % 28.6* 32.6* 32.5*   PLATELETS 10*3/mm3 346 388 400     Results from last 7 days   Lab Units 03/01/23  0709 02/27/23  0545 02/26/23  0455 02/25/23  0643 02/24/23  0604   SODIUM mmol/L 139 136 133* 134* 136   POTASSIUM mmol/L 4.4 4.6 4.3 4.1 4.3   CHLORIDE mmol/L 104 103 102 98 103   CO2 mmol/L 25.1 26.5 25.6 25.7 24.1   BUN mg/dL 15 22 23 16 10   CREATININE mg/dL 1.16* 1.28* 1.27* 1.15* 1.21*   CALCIUM mg/dL 7.6* 8.6 8.3* 8.2* 7.9*   BILIRUBIN mg/dL  --  <0.2  --  0.3 0.3   ALK PHOS U/L  --  79  --  80 66   ALT (SGPT) U/L  --  6  --  6 5   AST (SGOT) U/L  --  8  --  12 7   GLUCOSE mg/dL 92 119* 107* 134* 139*             A/P: 72 y.o. female POD#8 open ileocecectomy, POD#21 laparoscopic right colon resection    Her KUB today looks much better " with gas in the distal transverse, descending, and sigmoid colon.  However, she is still having significant early satiety and nausea with small-volume emesis this morning.  I will continue with full liquids for now, and I am hopeful we can advance her to regular diet tomorrow.    Eleni Torre MD  General, Robotic, and Endoscopic Surgery  [facility]    [address], [address]  [place] [address]  P: [phone]  F: [phone]

## 2023-03-02 NOTE — PROGRESS NOTES
Nutrition Services    Patient Name:  Stefani Watson  YOB: 1950  MRN: [id number]  Admit Date:  2/14/2023    Visited pt to check on intakes. Pt's diet remains full liquids. Pt reported better intakes and symptom relief for the first part of today, eating some of her cream of wheat this morning and half a bowl of tomato soup for lunch. Shortly after her soup, pt reported having GI discomfort/cramps again. Recommended pt to avoid acidic foods such as tomato, citrus fruits and caffeine and informed her that a BRAT diet may benefit her. When asked about ONS, pt says she isn't drinking her Boost Breeze as much d/t discomfort. She was willing to try a Boost Soothe, which is gentler on the stomach. Writer left her one in her room to try at her convenience. Will follow up at a later date to inquire about acceptance.     RD will continue to follow-up, per protocol.      Electronically signed by:  Alba Damon  03/02/23 16:03 EST

## 2023-03-02 NOTE — PLAN OF CARE
"  Problem: Adult Inpatient Plan of Care  Goal: Patient-Specific Goal (Individualized)  Outcome: Ongoing, Progressing  Flowsheets  Taken 3/2/2023 1708 by Ellie Segura, RN  Patient-Specific Goals (Include Timeframe): Anti-nausea medication as needed  Taken 2/21/2023 0917 by Jania Garcia, RN  Anxieties, Fears or Concerns: None mentioned   Goal Outcome Evaluation:           Progress: no change  Outcome Evaluation: Patient alert and oriented. Up in the chair most of the day today. BM today; pt verbalized stool was more \"solid\" today. Nausea off and on today; vomiting soon after morning med pass. Given zofran x2. VSS. Will continue to monitor for s/sx of discomfort.  "

## 2023-03-03 PROCEDURE — 99024 POSTOP FOLLOW-UP VISIT: CPT | Performed by: SURGERY

## 2023-03-03 RX ADMIN — Medication 10 ML: at 09:47

## 2023-03-03 RX ADMIN — CALCIUM CARBONATE-VITAMIN D TAB 500 MG-200 UNIT 1 TABLET: 500-200 TAB at 09:47

## 2023-03-03 RX ADMIN — PRAVASTATIN SODIUM 40 MG: 40 TABLET ORAL at 20:00

## 2023-03-03 RX ADMIN — CITALOPRAM 20 MG: 20 TABLET, FILM COATED ORAL at 09:47

## 2023-03-03 RX ADMIN — CALCITRIOL 0.25 MCG: 0.25 CAPSULE ORAL at 09:47

## 2023-03-03 RX ADMIN — LATANOPROST 1 DROP: 50 SOLUTION OPHTHALMIC at 20:00

## 2023-03-03 RX ADMIN — Medication 10 ML: at 20:01

## 2023-03-03 RX ADMIN — MAGNESIUM OXIDE 400 MG (241.3 MG MAGNESIUM) TABLET 400 MG: TABLET at 09:47

## 2023-03-03 RX ADMIN — OXYCODONE HYDROCHLORIDE AND ACETAMINOPHEN 1 TABLET: 10; 325 TABLET ORAL at 20:00

## 2023-03-03 RX ADMIN — LEVOTHYROXINE SODIUM 125 MCG: 0.12 TABLET ORAL at 06:58

## 2023-03-03 NOTE — PLAN OF CARE
Goal Outcome Evaluation:  Plan of Care Reviewed With: patient        Progress: improving  Outcome Evaluation: VSS. Denies nausea. Reported BM last evening. Percocet x1 for pain. Slept between care. Will monitor.

## 2023-03-03 NOTE — PROGRESS NOTES
"General Surgery  Progress Note    CC: Follow-up small bowel obstruction after recent right colon resection for colon cancer    POD#9 open ileocecectomy  POD#22 laparoscopic right colon resection    S: She has had no further emesis and last received Zofran yesterday afternoon.  Her pain is well controlled, with her last Percocet needed yesterday.  She continues to pass flatus and bowel movements and is tolerating a full liquid diet.    O:/79 (BP Location: Right arm, Patient Position: Lying)   Pulse 65   Temp 96.9 °F (36.1 °C) (Oral)   Resp 16   Ht 165.1 cm (65\")   Wt 93.1 kg (205 lb 4 oz)   LMP  (LMP Unknown)   SpO2 96%   BMI 34.16 kg/m²     Intake & Output:  UOP: Void x5  BM x1    GENERAL: alert, well appearing, and in no distress  HEENT: normocephalic, atraumatic, moist mucous membranes, clear sclerae   CHEST: clear to auscultation, no wheezes, rales or rhonchi, symmetric air entry  CARDIAC: regular rate and rhythm    ABDOMEN: Soft, incisions clean/dry/intact with staples  EXTREMITIES: no cyanosis, clubbing, or edema   SKIN: Warm and moist, no rashes    LABS  Results from last 7 days   Lab Units 02/26/23  0455 02/25/23  0643   WBC 10*3/mm3 8.78 10.74   HEMOGLOBIN g/dL 8.8* 10.2*   HEMATOCRIT % 28.6* 32.6*   PLATELETS 10*3/mm3 346 388     Results from last 7 days   Lab Units 03/01/23  0709 02/27/23  0545 02/26/23  0455 02/25/23  0643   SODIUM mmol/L 139 136 133* 134*   POTASSIUM mmol/L 4.4 4.6 4.3 4.1   CHLORIDE mmol/L 104 103 102 98   CO2 mmol/L 25.1 26.5 25.6 25.7   BUN mg/dL 15 22 23 16   CREATININE mg/dL 1.16* 1.28* 1.27* 1.15*   CALCIUM mg/dL 7.6* 8.6 8.3* 8.2*   BILIRUBIN mg/dL  --  <0.2  --  0.3   ALK PHOS U/L  --  79  --  80   ALT (SGPT) U/L  --  6  --  6   AST (SGOT) U/L  --  8  --  12   GLUCOSE mg/dL 92 119* 107* 134*             A/P: 72 y.o. female POD#9 open ileocecectomy, POD#22 laparoscopic right colon resection    Advance to GI soft diet.  Possibly home tomorrow.    Eleni Torre, " MD  General, Robotic, and Endoscopic Surgery  [facility]    [address], [address]  [place] [address]  P: [phone]  F: [phone]

## 2023-03-04 PROCEDURE — 63710000001 ONDANSETRON PER 8 MG: Performed by: SURGERY

## 2023-03-04 RX ADMIN — CALCITRIOL 0.25 MCG: 0.25 CAPSULE ORAL at 09:49

## 2023-03-04 RX ADMIN — LEVOTHYROXINE SODIUM 125 MCG: 0.12 TABLET ORAL at 06:44

## 2023-03-04 RX ADMIN — Medication 10 ML: at 09:49

## 2023-03-04 RX ADMIN — MAGNESIUM OXIDE 400 MG (241.3 MG MAGNESIUM) TABLET 400 MG: TABLET at 09:49

## 2023-03-04 RX ADMIN — CALCIUM CARBONATE-VITAMIN D TAB 500 MG-200 UNIT 1 TABLET: 500-200 TAB at 09:49

## 2023-03-04 RX ADMIN — ONDANSETRON HYDROCHLORIDE 4 MG: 4 TABLET, FILM COATED ORAL at 16:02

## 2023-03-04 RX ADMIN — Medication 10 ML: at 20:00

## 2023-03-04 RX ADMIN — CITALOPRAM 20 MG: 20 TABLET, FILM COATED ORAL at 09:48

## 2023-03-04 RX ADMIN — OXYCODONE HYDROCHLORIDE AND ACETAMINOPHEN 1 TABLET: 10; 325 TABLET ORAL at 17:59

## 2023-03-04 RX ADMIN — LATANOPROST 1 DROP: 50 SOLUTION OPHTHALMIC at 20:00

## 2023-03-04 RX ADMIN — DOCUSATE SODIUM 100 MG: 100 CAPSULE, LIQUID FILLED ORAL at 20:01

## 2023-03-04 RX ADMIN — PRAVASTATIN SODIUM 40 MG: 40 TABLET ORAL at 20:00

## 2023-03-04 NOTE — PLAN OF CARE
Goal Outcome Evaluation:  Plan of Care Reviewed With: patient        Progress: improving  Outcome Evaluation: Pt A&Ox4, up ad manuel. Pt tolerating solid foods. Zofran PRN x1. No PRN pain meds today. Pt walking around unit frequently today. VSS. Possible D/C home tomorrow.

## 2023-03-04 NOTE — PROGRESS NOTES
"General Surgery  Progress Note    CC: Follow-up small bowel obstruction after recent right colon resection for colon cancer    POD#10 open ileocecectomy  POD#23 laparoscopic right colon resection    S: Doing better overall. Some nausea. Tolerated fulls and ordered regular food for lunch. Pain controlled.     O:/68 (BP Location: Left arm, Patient Position: Sitting)   Pulse 70   Temp 97.8 °F (36.6 °C) (Oral)   Resp 16   Ht 165.1 cm (65\")   Wt 91.8 kg (202 lb 6.1 oz)   LMP  (LMP Unknown)   SpO2 99%   BMI 33.68 kg/m²     GENERAL: alert, well appearing, and in no distress  HEENT: normocephalic, atraumatic, moist mucous membranes, clear sclerae   CHEST: clear to auscultation, no wheezes, rales or rhonchi, symmetric air entry  CARDIAC: regular rate and rhythm    ABDOMEN: Soft, incisions clean/dry/intact with staples  EXTREMITIES: no cyanosis, clubbing, or edema   SKIN: Warm and moist, no rashes    LABS  Results from last 7 days   Lab Units 02/26/23  0455   WBC 10*3/mm3 8.78   HEMOGLOBIN g/dL 8.8*   HEMATOCRIT % 28.6*   PLATELETS 10*3/mm3 346     Results from last 7 days   Lab Units 03/01/23  0709 02/27/23  0545 02/26/23  0455   SODIUM mmol/L 139 136 133*   POTASSIUM mmol/L 4.4 4.6 4.3   CHLORIDE mmol/L 104 103 102   CO2 mmol/L 25.1 26.5 25.6   BUN mg/dL 15 22 23   CREATININE mg/dL 1.16* 1.28* 1.27*   CALCIUM mg/dL 7.6* 8.6 8.3*   BILIRUBIN mg/dL  --  <0.2  --    ALK PHOS U/L  --  79  --    ALT (SGPT) U/L  --  6  --    AST (SGOT) U/L  --  8  --    GLUCOSE mg/dL 92 119* 107*             A/P: 72 y.o. female POD#10 open ileocecectomy, POD#23 laparoscopic right colon resection    Diet as tolerated  PRN nausea and pain meds  Home tomorrow if tolerates regular food     Cookie Zuleta MD  General, Robotic, and Endoscopic Surgery  [facility]    [address], [address]  [place] [address]  P: [phone]  F: [phone]     "

## 2023-03-04 NOTE — PLAN OF CARE
Goal Outcome Evaluation:  Plan of Care Reviewed With: patient        Progress: declining  Outcome Evaluation: Denies nausea. Percocet x1 for pain at HS. Slept between care. VSS. Will monitor for needs.

## 2023-03-05 ENCOUNTER — READMISSION MANAGEMENT (OUTPATIENT)
Dept: CALL CENTER | Facility: HOSPITAL | Age: 73
End: 2023-03-05
Payer: MEDICARE

## 2023-03-05 VITALS
TEMPERATURE: 98.4 F | WEIGHT: 202.38 LBS | OXYGEN SATURATION: 98 % | DIASTOLIC BLOOD PRESSURE: 74 MMHG | BODY MASS INDEX: 33.72 KG/M2 | HEIGHT: 65 IN | HEART RATE: 59 BPM | SYSTOLIC BLOOD PRESSURE: 131 MMHG | RESPIRATION RATE: 16 BRPM

## 2023-03-05 RX ORDER — OXYCODONE AND ACETAMINOPHEN 10; 325 MG/1; MG/1
1 TABLET ORAL EVERY 4 HOURS PRN
Qty: 15 TABLET | Refills: 0 | Status: SHIPPED | OUTPATIENT
Start: 2023-03-05 | End: 2023-03-08

## 2023-03-05 RX ORDER — ONDANSETRON 4 MG/1
4 TABLET, FILM COATED ORAL EVERY 6 HOURS PRN
Qty: 20 TABLET | Refills: 0 | Status: SHIPPED | OUTPATIENT
Start: 2023-03-05

## 2023-03-05 RX ADMIN — LEVOTHYROXINE SODIUM 125 MCG: 0.12 TABLET ORAL at 06:56

## 2023-03-05 RX ADMIN — DOCUSATE SODIUM 100 MG: 100 CAPSULE, LIQUID FILLED ORAL at 10:31

## 2023-03-05 RX ADMIN — MAGNESIUM OXIDE 400 MG (241.3 MG MAGNESIUM) TABLET 400 MG: TABLET at 10:29

## 2023-03-05 RX ADMIN — CALCITRIOL 0.25 MCG: 0.25 CAPSULE ORAL at 10:29

## 2023-03-05 RX ADMIN — CALCIUM CARBONATE-VITAMIN D TAB 500 MG-200 UNIT 1 TABLET: 500-200 TAB at 10:29

## 2023-03-05 RX ADMIN — CITALOPRAM 20 MG: 20 TABLET, FILM COATED ORAL at 10:29

## 2023-03-05 NOTE — PLAN OF CARE
Goal Outcome Evaluation:  Plan of Care Reviewed With: patient        Progress: improving  Outcome Evaluation: Reported nausea last evening. Denied need for pain medication. Slept between care. VSS. Will monitor for needs.

## 2023-03-05 NOTE — OUTREACH NOTE
Prep Survey    Flowsheet Row Responses   [facility] facility patient discharged from? [place]   Is LACE score < 7 ? No   Eligibility [facility]   Date of Admission 03/14/23   Date of Discharge 03/05/23   Discharge Disposition Home or Self Care   Discharge diagnosis N/V, uretheral catheter/stent insertion, diagnostic laparoscopy with open illeo-coloic resection   Does the patient have one of the following disease processes/diagnoses(primary or secondary)? General Surgery   Does the patient have Home health ordered? No   Is there a DME ordered? No   Prep survey completed? Yes          Laura GREGORY - Registered Nurse

## 2023-03-06 ENCOUNTER — TRANSITIONAL CARE MANAGEMENT TELEPHONE ENCOUNTER (OUTPATIENT)
Dept: CALL CENTER | Facility: HOSPITAL | Age: 73
End: 2023-03-06
Payer: MEDICARE

## 2023-03-06 NOTE — CASE MANAGEMENT/SOCIAL WORK
Case Management Discharge Note      Final Note: Home         Selected Continued Care - Discharged on 3/5/2023 Admission date: 2/14/2023 - Discharge disposition: Home or Self Care    Destination    No services have been selected for the patient.           Durable Medical Equipment    No services have been selected for the patient.           Dialysis/Infusion    No services have been selected for the patient.           Home Medical Care    No services have been selected for the patient.           Therapy    No services have been selected for the patient.           Community Resources    No services have been selected for the patient.           Community & DME    No services have been selected for the patient.                    Final Discharge Disposition Code: 01 - home or self-care

## 2023-03-06 NOTE — OUTREACH NOTE
Call Center TCM Note    Flowsheet Row Responses   [facility] patient discharged from? [place]   Does the patient have one of the following disease processes/diagnoses(primary or secondary)? General Surgery   TCM attempt successful? Yes  [No VR]   Call start time 1405   Call end time 1411   Discharge diagnosis N/V, uretheral catheter/stent insertion, diagnostic laparoscopy with open illeo-coloic resection   Meds reviewed with patient/caregiver? Yes   Is the patient having any side effects they believe may be caused by any medication additions or changes? No   Does the patient have all medications related to this admission filled (includes all antibiotics, pain medications, etc.) Yes   Is the patient taking all medications as directed (includes completed medication regime)? Yes   Medication comments pain is well controlled with Tylenol.   Does the patient have an appointment with their PCP within 7 days of discharge? No   Nursing Interventions Patient desires to follow up with specialty only   Psychosocial issues? No   Comments Incision tender, has staples in place, no s/sx of infection. Pt c/o loose stool but no issues urinating. Appetite is improving. Denies N/V/abd pain   Did the patient receive a copy of their discharge instructions? Yes   Nursing interventions Reviewed instructions with patient   What is the patient's perception of their health status since discharge? Improving   Nursing interventions Nurse provided patient education   Is the patient /caregiver able to teach back basic post-op care? Continue use of incentive spirometry at least 1 week post discharge, Practice 'cough and deep breath', Take showers only when approved by MD-sponge bathe until then, No tub bath, swimming, or hot tub until instructed by MD, Keep incision areas clean,dry and protected   Is the patient/caregiver able to teach back signs and symptoms of incisional infection? Increased redness, swelling or pain at the incisonal  site, Increased drainage or bleeding   Is the patient/caregiver able to teach back steps to recovery at home? Set small, achievable goals for return to baseline health, Rest and rebuild strength, gradually increase activity   If the patient is a current smoker, are they able to teach back resources for cessation? Not a smoker   Is the patient/caregiver able to teach back the hierarchy of who to call/visit for symptoms/problems? PCP, Specialist, Home health nurse, Urgent Care, ED, 911 Yes   TCM call completed? Yes   Call end time 1411   Would this patient benefit from a Referral to [facility] Social Work? No   Is the patient interested in additional calls from an ambulatory ?  NOTE:  applies to high risk patients requiring additional follow-up. No          Lety Johnson RN    3/6/2023, 14:12 EST

## 2023-03-06 NOTE — PAYOR COMM NOTE
"Stefani Taylor (72 y.o. Female)       DC SUMMARY FOR [id number]        Date of Birth   1950    Social Security Number       Address   [facility] [address] [place] [address]    Home Phone   [phone]    MRN   [id number]       Evangelical   Episcopalian    Marital Status                               Admission Date   2/14/23    Admission Type   Emergency    Admitting Provider   Norma Barillas MD    Attending Provider       Department, Room/Bed   [address], P498/1       Discharge Date   3/5/2023    Discharge Disposition   Home or Self Care    Discharge Destination                               Attending Provider: (none)   Allergies: Blue Dyes (Parenteral), Other, Simvastatin    Isolation: None   Infection: None   Code Status: Prior    Ht: 165.1 cm (65\")   Wt: 91.8 kg (202 lb 6.1 oz)    Admission Cmt: None   Principal Problem: Nausea and vomiting, unspecified vomiting type [R11.2]                 Active Insurance as of 2/14/2023     Primary Coverage     Payor Plan Insurance Group Employer/Plan Group    [employer] [place] MEDICARE REPLACEMENT [facility] MEDICARE REPLACEMENT      Payor Plan Address Payor Plan Phone Number Payor Plan Fax Number Effective Dates    [address] [address] [phone]  9/1/2021 - None Entered    [facility] [address]       Subscriber Name Subscriber Birth Date Member ID       STEFANI TAYLOR 1950 [id number]                 Emergency Contacts      (Rel.) Home Phone Work Phone Mobile Phone    CARL TAYLOR (Son) [phone] -- [phone]            Discharge Summary    No notes of this type exist for this encounter.     DC TO HOME 3/5/23    "

## 2023-03-16 ENCOUNTER — READMISSION MANAGEMENT (OUTPATIENT)
Dept: CALL CENTER | Facility: HOSPITAL | Age: 73
End: 2023-03-16
Payer: MEDICARE

## 2023-03-16 NOTE — OUTREACH NOTE
General Surgery Week 2 Survey    Flowsheet Row Responses   [facility] patient discharged from? [place]   Does the patient have one of the following disease processes/diagnoses(primary or secondary)? General Surgery   Week 2 attempt successful? Yes   Call start time 1150   Call end time 1152   Discharge diagnosis N/V, uretheral catheter/stent insertion, diagnostic laparoscopy with open illeo-coloic resection   Meds reviewed with patient/caregiver? Yes   Is the patient having any side effects they believe may be caused by any medication additions or changes? No   Does the patient have all medications related to this admission filled (includes all antibiotics, pain medications, etc.) Yes   Is the patient taking all medications as directed (includes completed medication regime)? Yes   Does the patient have a follow up appointment scheduled with their surgeon? Yes   Has the patient kept scheduled appointments due by today? N/A   Comments Urology follow up 3/16/23. Surgery follow up 3/23/23   Has home health visited the patient within 72 hours of discharge? N/A   Psychosocial issues? No   What is the patient's perception of their health status since discharge? Improving   Is the patient /caregiver able to teach back basic post-op care? Keep incision areas clean,dry and protected, Practice 'cough and deep breath'   Is the patient/caregiver able to teach back signs and symptoms of incisional infection? Increased redness, swelling or pain at the incisonal site, Increased drainage or bleeding, Incisional warmth, Pus or odor from incision, Fever   Is the patient/caregiver able to teach back steps to recovery at home? Set small, achievable goals for return to baseline health, Rest and rebuild strength, gradually increase activity, Eat a well-balance diet   Week 2 call completed? Yes   Is the patient interested in additional calls from an ambulatory ?  NOTE:  applies to high risk patients requiring  additional follow-up. No   Wrap up additional comments Son reports patient is improving, decreased appetite, had urology follow up today          Janet AGUILERA - Registered Nurse

## 2023-03-20 PROBLEM — N83.209 OVARIAN CYSTIC MASS: Status: RESOLVED | Noted: 2023-01-11 | Resolved: 2023-03-20

## 2023-03-20 PROBLEM — K63.5 COLON POLYP: Status: RESOLVED | Noted: 2023-01-11 | Resolved: 2023-03-20

## 2023-03-20 NOTE — PROGRESS NOTES
SURGERY: KULDEEP  Post op Visit  Stefani Watson  03/22/2023    Ms. Watson  presents today after having undergone Surgery twice, first 1/9/23, of an open right hemicolectomy and BSO, followed by an open ileocolic resection for SBO 2/23/23.  She still had difficulty with an ileus postop but was ultimately discharged on 3/9/23.  During her second admission, she had a ureteral/pelvic kidney leak that was stented successfully but Dr Marshall Antonio.     Today, she complains of diarrhea, but is on magnesium oxide, taken to prevent getting 'bound up' by calcium which she takes with calcitriol for hypoparathyroidism after thyroid surgery 20 years ago.  Her intact PTH is actually normal.  Instructed her to stop her magnesium and will refer to endocrinology to see about being able to go off the calcium and calcitriol.   Her albumin is low so i would suggest that she wait on that visit.    Her pathology was a T1N0 happily, her polyp being a carpet polyp.      She says she has a bad taste, but doesn't have obvious thrush.  Recommended yogurt, probiotic since she will be getting abx again when she has her stent removed next week.    Recommended pepcid for reflux and indigestion.    Follow up one month and will refer to endocrinology then.     Norma Barillas MD  13:54 EDT

## 2023-03-22 ENCOUNTER — OFFICE VISIT (OUTPATIENT)
Dept: SURGERY | Facility: CLINIC | Age: 73
End: 2023-03-22
Payer: MEDICARE

## 2023-03-22 DIAGNOSIS — C18.2 MALIGNANT NEOPLASM OF ASCENDING COLON: Primary | ICD-10-CM

## 2023-03-22 DIAGNOSIS — N83.201 CYSTS OF BOTH OVARIES: ICD-10-CM

## 2023-03-22 DIAGNOSIS — Z80.41 FAMILY HISTORY OF OVARIAN CANCER: ICD-10-CM

## 2023-03-22 DIAGNOSIS — Z86.010 HISTORY OF COLON POLYPS: ICD-10-CM

## 2023-03-22 DIAGNOSIS — K56.609 SBO (SMALL BOWEL OBSTRUCTION): ICD-10-CM

## 2023-03-22 DIAGNOSIS — N83.202 CYSTS OF BOTH OVARIES: ICD-10-CM

## 2023-03-22 PROCEDURE — 1160F RVW MEDS BY RX/DR IN RCRD: CPT | Performed by: SURGERY

## 2023-03-22 PROCEDURE — 1159F MED LIST DOCD IN RCRD: CPT | Performed by: SURGERY

## 2023-03-22 PROCEDURE — 99024 POSTOP FOLLOW-UP VISIT: CPT | Performed by: SURGERY

## 2023-03-27 ENCOUNTER — PRE-ADMISSION TESTING (OUTPATIENT)
Dept: PREADMISSION TESTING | Facility: HOSPITAL | Age: 73
End: 2023-03-27
Payer: MEDICARE

## 2023-03-27 VITALS
HEIGHT: 66 IN | TEMPERATURE: 97.9 F | OXYGEN SATURATION: 99 % | WEIGHT: 185.6 LBS | BODY MASS INDEX: 29.83 KG/M2 | RESPIRATION RATE: 16 BRPM | SYSTOLIC BLOOD PRESSURE: 162 MMHG | DIASTOLIC BLOOD PRESSURE: 77 MMHG | HEART RATE: 72 BPM

## 2023-03-27 LAB
ANION GAP SERPL CALCULATED.3IONS-SCNC: 12 MMOL/L (ref 5–15)
BUN SERPL-MCNC: 16 MG/DL (ref 8–23)
BUN/CREAT SERPL: 18.6 (ref 7–25)
CALCIUM SPEC-SCNC: 8 MG/DL (ref 8.6–10.5)
CHLORIDE SERPL-SCNC: 104 MMOL/L (ref 98–107)
CO2 SERPL-SCNC: 23 MMOL/L (ref 22–29)
CREAT SERPL-MCNC: 0.86 MG/DL (ref 0.57–1)
DEPRECATED RDW RBC AUTO: 46.7 FL (ref 37–54)
EGFRCR SERPLBLD CKD-EPI 2021: 71 ML/MIN/1.73
ERYTHROCYTE [DISTWIDTH] IN BLOOD BY AUTOMATED COUNT: 16.7 % (ref 12.3–15.4)
GLUCOSE SERPL-MCNC: 105 MG/DL (ref 65–99)
HCT VFR BLD AUTO: 31.3 % (ref 34–46.6)
HGB BLD-MCNC: 9.8 G/DL (ref 12–15.9)
MCH RBC QN AUTO: 24 PG (ref 26.6–33)
MCHC RBC AUTO-ENTMCNC: 31.3 G/DL (ref 31.5–35.7)
MCV RBC AUTO: 76.7 FL (ref 79–97)
PLATELET # BLD AUTO: 437 10*3/MM3 (ref 140–450)
PMV BLD AUTO: 9.8 FL (ref 6–12)
POTASSIUM SERPL-SCNC: 3.8 MMOL/L (ref 3.5–5.2)
RBC # BLD AUTO: 4.08 10*6/MM3 (ref 3.77–5.28)
SODIUM SERPL-SCNC: 139 MMOL/L (ref 136–145)
WBC NRBC COR # BLD: 8.58 10*3/MM3 (ref 3.4–10.8)

## 2023-03-27 PROCEDURE — 85027 COMPLETE CBC AUTOMATED: CPT

## 2023-03-27 PROCEDURE — 80048 BASIC METABOLIC PNL TOTAL CA: CPT

## 2023-03-27 PROCEDURE — 36415 COLL VENOUS BLD VENIPUNCTURE: CPT

## 2023-03-27 NOTE — DISCHARGE INSTRUCTIONS
Take the following medications the morning of surgery with a small sip of water: LEVOTHYROXINE      If you are on prescription narcotic pain medication to control your pain you may also take that medication the morning of surgery.    General Instructions:  Do not eat or drink anything after midnight the night before surgery.  Patients who avoid smoking, chewing tobacco and alcohol for 4 weeks prior to surgery have a reduced risk of post-operative complications.  Quit smoking as many days before surgery as you can.  Do not smoke, use chewing tobacco or drink alcohol the day of surgery.   If applicable bring your C-PAP/ BI-PAP machine.  Bring any papers given to you in the doctor’s office.  Wear clean comfortable clothes.  Do not wear contact lenses, false eyelashes or make-up.  Bring a case for your glasses.   Bring crutches or walker if applicable.  Remove all piercings.  Leave jewelry and any other valuables at home.  Hair extensions with metal clips must be removed prior to surgery.  The Pre-Admission Testing nurse will instruct you to bring medications if unable to obtain an accurate list in Pre-Admission Testing.          Preventing a Surgical Site Infection:  For 2 to 3 days before surgery, avoid shaving with a razor because the razor can irritate skin and make it easier to develop an infection.    Any areas of open skin can increase the risk of a post-operative wound infection by allowing bacteria to enter and travel throughout the body.  Notify your surgeon if you have any skin wounds / rashes even if it is not near the expected surgical site.  The area will need assessed to determine if surgery should be delayed until it is healed.  The night prior to surgery shower using a fresh bar of anti-bacterial soap (such as Dial) and clean washcloth.  Sleep in a clean bed with clean clothing.  Do not allow pets to sleep with you.  Shower on the morning of surgery using a fresh bar of anti-bacterial soap (such as  Dial) and clean washcloth.  Dry with a clean towel and dress in clean clothing.  Ask your surgeon if you will be receiving antibiotics prior to surgery.  Make sure you, your family, and all healthcare providers clean their hands with soap and water or an alcohol based hand  before caring for you or your wound.    Day of surgery:  Your arrival time is approximately two hours before your scheduled surgery time.  Upon arrival, a Pre-op nurse and Anesthesiologist will review your health history, obtain vital signs, and answer questions you may have.  The only belongings needed at this time will be your home medications and if applicable your C-PAP/BI-PAP machine.  A Pre-op nurse will start an IV and you may receive medication in preparation for surgery, including something to help you relax.      Please be aware that surgery does come with discomfort.  We want to make every effort to control your discomfort so please discuss any uncontrolled symptoms with your nurse.   Your doctor will most likely have prescribed pain medications.      If you are going home after surgery you will receive individualized written care instructions before being discharged.  A responsible adult must drive you to and from the hospital on the day of your surgery and stay with you for 24 hours.  Discharge prescriptions can be filled by the hospital pharmacy during regular pharmacy hours.  If you are having surgery late in the day/evening your prescription may be e-prescribed to your pharmacy.  Please verify your pharmacy hours or chose a 24 hour pharmacy to avoid not having access to your prescription because your pharmacy has closed for the day.    If you are staying overnight following surgery, you will be transported to your hospital room following the recovery period.  [facility] has all private rooms.    If you have any questions please call Pre-Admission Testing at [phone].  Deductibles and co-payments are  collected on the day of service. Please be prepared to pay the required co-pay, deductible or deposit on the day of service as defined by your plan.    Call your surgeon immediately if you experience any of the following symptoms:  Sore Throat  Shortness of Breath or difficulty breathing  Cough  Chills  Body soreness or muscle pain  Headache  Fever  New loss of taste or smell  Do not arrive for your surgery ill.  Your procedure will need to be rescheduled to another time.  You will need to call your physician before the day of surgery to avoid any unnecessary exposure to hospital staff as well as other patients.

## 2023-03-29 ENCOUNTER — ANESTHESIA EVENT (OUTPATIENT)
Dept: PERIOP | Facility: HOSPITAL | Age: 73
End: 2023-03-29
Payer: MEDICARE

## 2023-03-29 ENCOUNTER — APPOINTMENT (OUTPATIENT)
Dept: GENERAL RADIOLOGY | Facility: HOSPITAL | Age: 73
End: 2023-03-29
Payer: MEDICARE

## 2023-03-29 ENCOUNTER — HOSPITAL ENCOUNTER (OUTPATIENT)
Facility: HOSPITAL | Age: 73
Setting detail: HOSPITAL OUTPATIENT SURGERY
Discharge: HOME | End: 2023-03-29
Attending: UROLOGY | Admitting: UROLOGY
Payer: MEDICARE

## 2023-03-29 ENCOUNTER — ANESTHESIA (OUTPATIENT)
Dept: PERIOP | Facility: HOSPITAL | Age: 73
End: 2023-03-29
Payer: MEDICARE

## 2023-03-29 VITALS
TEMPERATURE: 97.9 F | SYSTOLIC BLOOD PRESSURE: 136 MMHG | OXYGEN SATURATION: 97 % | DIASTOLIC BLOOD PRESSURE: 84 MMHG | HEART RATE: 57 BPM | HEIGHT: 66 IN | RESPIRATION RATE: 16 BRPM | BODY MASS INDEX: 29.96 KG/M2

## 2023-03-29 DIAGNOSIS — N13.30 HYDROURETERONEPHROSIS: Primary | ICD-10-CM

## 2023-03-29 PROCEDURE — 74420 UROGRAPHY RTRGR +-KUB: CPT

## 2023-03-29 PROCEDURE — C1769 GUIDE WIRE: HCPCS | Performed by: UROLOGY

## 2023-03-29 PROCEDURE — C2617 STENT, NON-COR, TEM W/O DEL: HCPCS | Performed by: UROLOGY

## 2023-03-29 PROCEDURE — 25010000002 FENTANYL CITRATE (PF) 50 MCG/ML SOLUTION: Performed by: ANESTHESIOLOGY

## 2023-03-29 PROCEDURE — 25010000002 PROPOFOL 10 MG/ML EMULSION: Performed by: NURSE ANESTHETIST, CERTIFIED REGISTERED

## 2023-03-29 PROCEDURE — C1758 CATHETER, URETERAL: HCPCS | Performed by: UROLOGY

## 2023-03-29 PROCEDURE — 25010000002 CEFAZOLIN IN DEXTROSE 2-4 GM/100ML-% SOLUTION: Performed by: UROLOGY

## 2023-03-29 PROCEDURE — 0 IOTHALAMATE 60 % SOLUTION: Performed by: UROLOGY

## 2023-03-29 PROCEDURE — 25010000002 DEXAMETHASONE PER 1 MG: Performed by: NURSE ANESTHETIST, CERTIFIED REGISTERED

## 2023-03-29 PROCEDURE — 25010000002 ONDANSETRON PER 1 MG: Performed by: NURSE ANESTHETIST, CERTIFIED REGISTERED

## 2023-03-29 DEVICE — URETERAL STENT
Type: IMPLANTABLE DEVICE | Site: URETER | Status: FUNCTIONAL
Brand: CONTOUR™

## 2023-03-29 RX ORDER — PROMETHAZINE HYDROCHLORIDE 25 MG/1
25 SUPPOSITORY RECTAL ONCE AS NEEDED
Status: DISCONTINUED | OUTPATIENT
Start: 2023-03-29 | End: 2023-03-29 | Stop reason: HOSPADM

## 2023-03-29 RX ORDER — LIDOCAINE HYDROCHLORIDE 10 MG/ML
0.5 INJECTION, SOLUTION EPIDURAL; INFILTRATION; INTRACAUDAL; PERINEURAL ONCE AS NEEDED
Status: DISCONTINUED | OUTPATIENT
Start: 2023-03-29 | End: 2023-03-29

## 2023-03-29 RX ORDER — SODIUM CHLORIDE 0.9 % (FLUSH) 0.9 %
3-10 SYRINGE (ML) INJECTION AS NEEDED
Status: DISCONTINUED | OUTPATIENT
Start: 2023-03-29 | End: 2023-03-29 | Stop reason: HOSPADM

## 2023-03-29 RX ORDER — PROPOFOL 10 MG/ML
VIAL (ML) INTRAVENOUS AS NEEDED
Status: DISCONTINUED | OUTPATIENT
Start: 2023-03-29 | End: 2023-03-29 | Stop reason: SURG

## 2023-03-29 RX ORDER — MAGNESIUM HYDROXIDE 1200 MG/15ML
LIQUID ORAL AS NEEDED
Status: DISCONTINUED | OUTPATIENT
Start: 2023-03-29 | End: 2023-03-29 | Stop reason: HOSPADM

## 2023-03-29 RX ORDER — EPHEDRINE SULFATE 50 MG/ML
5 INJECTION, SOLUTION INTRAVENOUS ONCE AS NEEDED
Status: DISCONTINUED | OUTPATIENT
Start: 2023-03-29 | End: 2023-03-29 | Stop reason: HOSPADM

## 2023-03-29 RX ORDER — HYDROMORPHONE HYDROCHLORIDE 1 MG/ML
0.25 INJECTION, SOLUTION INTRAMUSCULAR; INTRAVENOUS; SUBCUTANEOUS
Status: DISCONTINUED | OUTPATIENT
Start: 2023-03-29 | End: 2023-03-29 | Stop reason: HOSPADM

## 2023-03-29 RX ORDER — SODIUM CHLORIDE, SODIUM LACTATE, POTASSIUM CHLORIDE, CALCIUM CHLORIDE 600; 310; 30; 20 MG/100ML; MG/100ML; MG/100ML; MG/100ML
9 INJECTION, SOLUTION INTRAVENOUS CONTINUOUS
Status: DISCONTINUED | OUTPATIENT
Start: 2023-03-29 | End: 2023-03-29 | Stop reason: HOSPADM

## 2023-03-29 RX ORDER — SODIUM CHLORIDE 0.9 % (FLUSH) 0.9 %
3 SYRINGE (ML) INJECTION EVERY 12 HOURS SCHEDULED
Status: DISCONTINUED | OUTPATIENT
Start: 2023-03-29 | End: 2023-03-29

## 2023-03-29 RX ORDER — SODIUM CHLORIDE, SODIUM LACTATE, POTASSIUM CHLORIDE, CALCIUM CHLORIDE 600; 310; 30; 20 MG/100ML; MG/100ML; MG/100ML; MG/100ML
9 INJECTION, SOLUTION INTRAVENOUS CONTINUOUS
Status: DISCONTINUED | OUTPATIENT
Start: 2023-03-29 | End: 2023-03-29

## 2023-03-29 RX ORDER — NALOXONE HCL 0.4 MG/ML
0.2 VIAL (ML) INJECTION AS NEEDED
Status: DISCONTINUED | OUTPATIENT
Start: 2023-03-29 | End: 2023-03-29 | Stop reason: HOSPADM

## 2023-03-29 RX ORDER — FAMOTIDINE 10 MG/ML
20 INJECTION, SOLUTION INTRAVENOUS ONCE
Status: DISCONTINUED | OUTPATIENT
Start: 2023-03-29 | End: 2023-03-29

## 2023-03-29 RX ORDER — ONDANSETRON 2 MG/ML
INJECTION INTRAMUSCULAR; INTRAVENOUS AS NEEDED
Status: DISCONTINUED | OUTPATIENT
Start: 2023-03-29 | End: 2023-03-29 | Stop reason: SURG

## 2023-03-29 RX ORDER — DROPERIDOL 2.5 MG/ML
0.62 INJECTION, SOLUTION INTRAMUSCULAR; INTRAVENOUS
Status: DISCONTINUED | OUTPATIENT
Start: 2023-03-29 | End: 2023-03-29 | Stop reason: HOSPADM

## 2023-03-29 RX ORDER — FENTANYL CITRATE 50 UG/ML
50 INJECTION, SOLUTION INTRAMUSCULAR; INTRAVENOUS
Status: COMPLETED | OUTPATIENT
Start: 2023-03-29 | End: 2023-03-29

## 2023-03-29 RX ORDER — LIDOCAINE HYDROCHLORIDE 20 MG/ML
INJECTION, SOLUTION INFILTRATION; PERINEURAL AS NEEDED
Status: DISCONTINUED | OUTPATIENT
Start: 2023-03-29 | End: 2023-03-29 | Stop reason: SURG

## 2023-03-29 RX ORDER — HYDROCODONE BITARTRATE AND ACETAMINOPHEN 7.5; 325 MG/1; MG/1
1 TABLET ORAL EVERY 4 HOURS PRN
Status: DISCONTINUED | OUTPATIENT
Start: 2023-03-29 | End: 2023-03-29 | Stop reason: HOSPADM

## 2023-03-29 RX ORDER — MIDAZOLAM HYDROCHLORIDE 1 MG/ML
0.5 INJECTION INTRAMUSCULAR; INTRAVENOUS
Status: DISCONTINUED | OUTPATIENT
Start: 2023-03-29 | End: 2023-03-29

## 2023-03-29 RX ORDER — LIDOCAINE HYDROCHLORIDE 10 MG/ML
0.5 INJECTION, SOLUTION EPIDURAL; INFILTRATION; INTRACAUDAL; PERINEURAL ONCE AS NEEDED
Status: DISCONTINUED | OUTPATIENT
Start: 2023-03-29 | End: 2023-03-29 | Stop reason: HOSPADM

## 2023-03-29 RX ORDER — ONDANSETRON 2 MG/ML
4 INJECTION INTRAMUSCULAR; INTRAVENOUS ONCE AS NEEDED
Status: DISCONTINUED | OUTPATIENT
Start: 2023-03-29 | End: 2023-03-29 | Stop reason: HOSPADM

## 2023-03-29 RX ORDER — FAMOTIDINE 10 MG/ML
20 INJECTION, SOLUTION INTRAVENOUS ONCE
Status: COMPLETED | OUTPATIENT
Start: 2023-03-29 | End: 2023-03-29

## 2023-03-29 RX ORDER — SODIUM CHLORIDE 0.9 % (FLUSH) 0.9 %
3-10 SYRINGE (ML) INJECTION AS NEEDED
Status: DISCONTINUED | OUTPATIENT
Start: 2023-03-29 | End: 2023-03-29

## 2023-03-29 RX ORDER — SODIUM CHLORIDE 0.9 % (FLUSH) 0.9 %
3 SYRINGE (ML) INJECTION EVERY 12 HOURS SCHEDULED
Status: DISCONTINUED | OUTPATIENT
Start: 2023-03-29 | End: 2023-03-29 | Stop reason: HOSPADM

## 2023-03-29 RX ORDER — FENTANYL CITRATE 50 UG/ML
25 INJECTION, SOLUTION INTRAMUSCULAR; INTRAVENOUS
Status: DISCONTINUED | OUTPATIENT
Start: 2023-03-29 | End: 2023-03-29 | Stop reason: HOSPADM

## 2023-03-29 RX ORDER — DIPHENHYDRAMINE HYDROCHLORIDE 50 MG/ML
12.5 INJECTION INTRAMUSCULAR; INTRAVENOUS
Status: DISCONTINUED | OUTPATIENT
Start: 2023-03-29 | End: 2023-03-29 | Stop reason: HOSPADM

## 2023-03-29 RX ORDER — FAMOTIDINE 10 MG
10 TABLET ORAL 2 TIMES DAILY PRN
COMMUNITY
End: 2023-04-17

## 2023-03-29 RX ORDER — DEXAMETHASONE SODIUM PHOSPHATE 4 MG/ML
INJECTION, SOLUTION INTRA-ARTICULAR; INTRALESIONAL; INTRAMUSCULAR; INTRAVENOUS; SOFT TISSUE AS NEEDED
Status: DISCONTINUED | OUTPATIENT
Start: 2023-03-29 | End: 2023-03-29 | Stop reason: SURG

## 2023-03-29 RX ORDER — IPRATROPIUM BROMIDE AND ALBUTEROL SULFATE 2.5; .5 MG/3ML; MG/3ML
3 SOLUTION RESPIRATORY (INHALATION) ONCE AS NEEDED
Status: DISCONTINUED | OUTPATIENT
Start: 2023-03-29 | End: 2023-03-29 | Stop reason: HOSPADM

## 2023-03-29 RX ORDER — LABETALOL HYDROCHLORIDE 5 MG/ML
5 INJECTION, SOLUTION INTRAVENOUS
Status: DISCONTINUED | OUTPATIENT
Start: 2023-03-29 | End: 2023-03-29 | Stop reason: HOSPADM

## 2023-03-29 RX ORDER — PROMETHAZINE HYDROCHLORIDE 25 MG/1
25 TABLET ORAL ONCE AS NEEDED
Status: DISCONTINUED | OUTPATIENT
Start: 2023-03-29 | End: 2023-03-29 | Stop reason: HOSPADM

## 2023-03-29 RX ORDER — SULFAMETHOXAZOLE AND TRIMETHOPRIM 400; 80 MG/1; MG/1
1 TABLET ORAL NIGHTLY
Qty: 30 TABLET | Refills: 2 | Status: SHIPPED | OUTPATIENT
Start: 2023-03-29 | End: 2023-04-28

## 2023-03-29 RX ORDER — MIDAZOLAM HYDROCHLORIDE 1 MG/ML
0.5 INJECTION INTRAMUSCULAR; INTRAVENOUS
Status: DISCONTINUED | OUTPATIENT
Start: 2023-03-29 | End: 2023-03-29 | Stop reason: HOSPADM

## 2023-03-29 RX ORDER — OXYCODONE HYDROCHLORIDE AND ACETAMINOPHEN 5; 325 MG/1; MG/1
1-2 TABLET ORAL EVERY 6 HOURS PRN
Qty: 30 TABLET | Refills: 0 | Status: SHIPPED | OUTPATIENT
Start: 2023-03-29 | End: 2023-04-17

## 2023-03-29 RX ORDER — CEFAZOLIN SODIUM 2 G/100ML
2 INJECTION, SOLUTION INTRAVENOUS ONCE
Status: COMPLETED | OUTPATIENT
Start: 2023-03-29 | End: 2023-03-29

## 2023-03-29 RX ORDER — FLUMAZENIL 0.1 MG/ML
0.2 INJECTION INTRAVENOUS AS NEEDED
Status: DISCONTINUED | OUTPATIENT
Start: 2023-03-29 | End: 2023-03-29 | Stop reason: HOSPADM

## 2023-03-29 RX ORDER — HYDRALAZINE HYDROCHLORIDE 20 MG/ML
5 INJECTION INTRAMUSCULAR; INTRAVENOUS
Status: DISCONTINUED | OUTPATIENT
Start: 2023-03-29 | End: 2023-03-29 | Stop reason: HOSPADM

## 2023-03-29 RX ORDER — HYDROCODONE BITARTRATE AND ACETAMINOPHEN 5; 325 MG/1; MG/1
1 TABLET ORAL ONCE AS NEEDED
Status: DISCONTINUED | OUTPATIENT
Start: 2023-03-29 | End: 2023-03-29 | Stop reason: HOSPADM

## 2023-03-29 RX ORDER — FENTANYL CITRATE 50 UG/ML
50 INJECTION, SOLUTION INTRAMUSCULAR; INTRAVENOUS
Status: DISCONTINUED | OUTPATIENT
Start: 2023-03-29 | End: 2023-03-29

## 2023-03-29 RX ADMIN — FENTANYL CITRATE 25 MCG: 50 INJECTION, SOLUTION INTRAMUSCULAR; INTRAVENOUS at 09:10

## 2023-03-29 RX ADMIN — PROPOFOL 150 MG: 10 INJECTION, EMULSION INTRAVENOUS at 09:05

## 2023-03-29 RX ADMIN — CEFAZOLIN SODIUM 2 G: 2 INJECTION, SOLUTION INTRAVENOUS at 09:11

## 2023-03-29 RX ADMIN — ONDANSETRON 4 MG: 2 INJECTION INTRAMUSCULAR; INTRAVENOUS at 09:05

## 2023-03-29 RX ADMIN — CEFAZOLIN SODIUM 2 G: 2 INJECTION, SOLUTION INTRAVENOUS at 08:54

## 2023-03-29 RX ADMIN — DEXAMETHASONE SODIUM PHOSPHATE 10 MG: 4 INJECTION, SOLUTION INTRA-ARTICULAR; INTRALESIONAL; INTRAMUSCULAR; INTRAVENOUS; SOFT TISSUE at 09:09

## 2023-03-29 RX ADMIN — FAMOTIDINE 20 MG: 10 INJECTION, SOLUTION INTRAVENOUS at 08:11

## 2023-03-29 RX ADMIN — LIDOCAINE HYDROCHLORIDE 100 MG: 20 INJECTION, SOLUTION INFILTRATION; PERINEURAL at 09:05

## 2023-03-29 RX ADMIN — FENTANYL CITRATE 25 MCG: 50 INJECTION, SOLUTION INTRAMUSCULAR; INTRAVENOUS at 09:34

## 2023-03-29 RX ADMIN — SODIUM CHLORIDE, POTASSIUM CHLORIDE, SODIUM LACTATE AND CALCIUM CHLORIDE 9 ML/HR: 600; 310; 30; 20 INJECTION, SOLUTION INTRAVENOUS at 08:11

## 2023-03-29 NOTE — ADDENDUM NOTE
Addendum  created 03/29/23 1647 by Chirag Fletcher MD    Attestation recorded in Intraprocedure, Intraprocedure Attestations filed

## 2023-03-29 NOTE — ANESTHESIA PROCEDURE NOTES
Airway  Urgency: elective    Date/Time: 3/29/2023 9:08 AM    General Information and Staff    Patient location during procedure: OR  CRNA/CAA: Collin Martins CRNA    Indications and Patient Condition  Indications for airway management: airway protection    Preoxygenated: yes  MILS not maintained throughout  Mask difficulty assessment: 0 - not attempted    Final Airway Details  Final airway type: supraglottic airway      Successful airway: LMA  Size 3     Number of attempts at approach: 1  Assessment: lips, teeth, and gum same as pre-op

## 2023-03-29 NOTE — ANESTHESIA POSTPROCEDURE EVALUATION
"Patient: Stefani Watson    Procedure Summary     Date: 03/29/23 Room / Location: [facility] OR 01 / [facility] MAIN OR    Anesthesia Start: 0857 Anesthesia Stop: 0946    Procedure: CYSTOSCOPY , RIGHT STENT exchange, right ureteroscopy, RETROGRADE PYELOGRAM (Right) Diagnosis:       Hydronephrosis      (Right hydronephrosis)    Surgeons: Paul Antonio MD Provider: Chirag Fletcher MD    Anesthesia Type: general ASA Status: 3          Anesthesia Type: general    Vitals  Vitals Value Taken Time   /82 03/29/23 1018   Temp 36.6 °C (97.9 °F) 03/29/23 0945   Pulse 55 03/29/23 1023   Resp 16 03/29/23 1015   SpO2 95 % 03/29/23 1023   Vitals shown include unvalidated device data.        Post Anesthesia Care and Evaluation    Patient location during evaluation: bedside  Patient participation: complete - patient participated  Level of consciousness: awake and alert  Pain management: adequate    Airway patency: patent  Anesthetic complications: No anesthetic complications    Cardiovascular status: acceptable  Respiratory status: acceptable  Hydration status: acceptable    Comments: /75 (BP Location: Left arm, Patient Position: Lying)   Pulse 52   Temp 36.6 °C (97.9 °F) (Oral)   Resp 16   Ht 167.6 cm (66\")   LMP  (LMP Unknown)   SpO2 98%   BMI 29.96 kg/m²       "

## 2023-03-29 NOTE — H&P
[facility] Surgical History and Physical    Patient Care Team:  Mindy Arias MD as PCP - General (Family Medicine)    Chief complaint flank pain    Subjective     Patient is a 72 y.o. female presents with hematuria and indwelling stent for evaluatin of ureter.     Review of Systems   Pertinent items are noted in HPI    Past Medical History:   Diagnosis Date   • Colon polyp 1/09/2023   • Colonic mass    • Diverticulitis    • Diverticulosis 1/09/2023   • [place] (hard of hearing)     SIGNIFICANT-BILAT HEARING AIDS   • Hydronephrosis     RIGHT   • Hyperlipidemia    • Hypothyroidism 9/11 /2001    Thyroid removed   • OA (osteoarthritis) of hip    • PONV (postoperative nausea and vomiting)    • Stress incontinence    • Thyroid cancer (HCC)     RADIOACTIVE IODINE     Past Surgical History:   Procedure Laterality Date   • CATARACT EXTRACTION, BILATERAL     • COLON RESECTION N/A 2/9/2023    Procedure: laparoscopic right hemicolectomyconverted to open  bilateral ooperectomy;  Surgeon: Norma Barillas MD;  Location: [facility] OR;  Service: General;  Laterality: N/A;   • COLON RESECTION N/A 2/22/2023    Procedure: DIAGNOSTIC LAPAROSCOPY WITH OPEN ILLEO-COLOIC RESECTION;  Surgeon: Norma Barillas MD;  Location: [facility] MAIN OR;  Service: General;  Laterality: N/A;   • COLONOSCOPY N/A 01/09/2023    Procedure: COLONOSCOPY to cecum with biopsy of ileo cecal valve polyp;  Surgeon: Norma Barillas MD;  Location: [facility] ENDOSCOPY;  Service: General;  Laterality: N/A;  PRE - screening  POST - diverticulosis, ileo cecal valve polyp   • CYSTOSCOPY W/ URETERAL STENT PLACEMENT Right 2/14/2023    Procedure: CYSTOSCOPY URETERAL CATHETER/STENT INSERTION, Retrograde Pyelogram;  Surgeon: Paul Antonio MD;  Location: [facility] OR;  Service: Urology;  Laterality: Right;   • EYE SURGERY      Cataracts   • LAPAROSCOPIC TUBAL LIGATION     • ROTATOR CUFF REPAIR Left    • STAPEDECTOMY Right    • THYROIDECTOMY Bilateral    • TUBAL  "ABDOMINAL LIGATION       Family History   Problem Relation Age of Onset   • Heart disease Mother    • Cancer Sister    • Ovarian cancer Sister    • Cancer Maternal Grandmother    • Breast cancer Maternal Grandmother    • Malig Hyperthermia Neg Hx      Social History     Tobacco Use   • Smoking status: Never   • Smokeless tobacco: Never   Vaping Use   • Vaping Use: Never used   Substance Use Topics   • Alcohol use: Yes     Comment: Occasionally   • Drug use: Never       Meds:  Medications Prior to Admission   Medication Sig Dispense Refill Last Dose   • citalopram (CeleXA) 20 MG tablet TAKE 1 TABLET BY MOUTH DAILY 90 tablet 1 3/28/2023 at 0800   • famotidine (PEPCID) 10 MG tablet Take 1 tablet by mouth 2 (Two) Times a Day As Needed for Heartburn.   3/28/2023 at 1500   • latanoprost (XALATAN) 0.005 % ophthalmic solution Administer 1 drop to both eyes Every Night.   3/28/2023 at 2100   • levothyroxine (SYNTHROID, LEVOTHROID) 125 MCG tablet TAKE 1 TABLET BY MOUTH EVERY MORNING (Patient taking differently: Take 1 tablet by mouth Every Morning.) 90 tablet 1 3/29/2023 at 0530   • pravastatin (PRAVACHOL) 40 MG tablet TAKE 1 TABLET BY MOUTH DAILY (Patient taking differently: Take 1 tablet by mouth Every Night.) 90 tablet 3 3/28/2023 at 2100   • calcitriol (ROCALTROL) 0.25 MCG capsule Take 1 capsule by mouth Daily. 90 capsule 1 Unknown   • calcium carbonate (OS-DIGNA) 600 MG tablet Take 2 tablets by mouth Daily.   Unknown   • ondansetron (ZOFRAN) 4 MG tablet Take 1 tablet by mouth Every 6 (Six) Hours As Needed for Nausea or Vomiting. 20 tablet 0 Unknown       Allergies:  Blue dyes (parenteral), Other, and Simvastatin    Debilities:  none    Objective     Vital Signs  Temp:  [97.6 °F (36.4 °C)] 97.6 °F (36.4 °C)  Heart Rate:  [70] 70  Resp:  [16] 16  BP: (125)/(77) 125/77  No intake or output data in the 24 hours ending 03/29/23 0816  Flowsheet Rows    Flowsheet Row First Filed Value   Admission Height 167.6 cm (66\") Documented " at 03/29/2023 0744   Admission Weight --           Physical Exam:     General Appearance:    Alert, cooperative, NAD   HEENT:    No trauma, pupils reactive, hearing intact   Back:     No CVA tenderness   Lungs:     Respirations unlabored, no wheezing    Heart:    RRR, intact peripheral pulses   Abdomen:     Soft, NDNT, no masses, no guarding   :    def   Extremities:   No edema, no deformity   Lymphatic:   No neck or groin LAD   Skin:   No bleeding, bruising or rashes   Neuro/Psych:   Orientation intact, mood/affect pleasant, no focal findings     Results Review:    I reviewed the patient's new clinical results.  Results for orders placed or performed in visit on 03/27/23   Basic Metabolic Panel    Specimen: Blood   Result Value Ref Range    Glucose 105 (H) 65 - 99 mg/dL    BUN 16 8 - 23 mg/dL    Creatinine 0.86 0.57 - 1.00 mg/dL    Sodium 139 136 - 145 mmol/L    Potassium 3.8 3.5 - 5.2 mmol/L    Chloride 104 98 - 107 mmol/L    CO2 23.0 22.0 - 29.0 mmol/L    Calcium 8.0 (L) 8.6 - 10.5 mg/dL    BUN/Creatinine Ratio 18.6 7.0 - 25.0    Anion Gap 12.0 5.0 - 15.0 mmol/L    eGFR 71.0 >60.0 mL/min/1.73   CBC (No Diff)    Specimen: Blood   Result Value Ref Range    WBC 8.58 3.40 - 10.80 10*3/mm3    RBC 4.08 3.77 - 5.28 10*6/mm3    Hemoglobin 9.8 (L) 12.0 - 15.9 g/dL    Hematocrit 31.3 (L) 34.0 - 46.6 %    MCV 76.7 (L) 79.0 - 97.0 fL    MCH 24.0 (L) 26.6 - 33.0 pg    MCHC 31.3 (L) 31.5 - 35.7 g/dL    RDW 16.7 (H) 12.3 - 15.4 %    RDW-SD 46.7 37.0 - 54.0 fl    MPV 9.8 6.0 - 12.0 fL    Platelets 437 140 - 450 10*3/mm3        Assessment:  Right Ureteral Obstruction    Plan:    Cystoscopy rt stent removal with retrogrades and possible stent replacement    I discussed the patient's findings and my recommendations with patient.   Risks, complications, outcomes and alternatives discussed with the patient at the bedside and office.    Paul Antonio MD  03/29/23  08:16 EDT

## 2023-03-29 NOTE — OP NOTE
CYSTOSCOPY URETERAL CATHETER/STENT INSERTION  Procedure Note    Stefani Watson  3/29/2023    Pre-op Diagnosis:   Right hydronephrosis    Post-op Diagnosis:     Post-Op Diagnosis Codes:     * Hydronephrosis [N13.30]    Procedure(s):  CYSTOSCOPY , RIGHT STENT exchange, right ureteroscopy, RETROGRADE PYELOGRAM    Surgeon(s):  Paul Antonio MD    Anesthesia: General    Staff:   Circulator: Jailyn Robles RN; Sharifa Singh RN  Radiology Technologist: Fermín Oconnor  Scrub Person: Grace Senait    Estimated Blood Loss: minimal    Specimens:                * No orders in the log *      Drains:   Ureteral Drain/Stent Right ureter 6 Fr. (Active)       [REMOVED] Closed/Suction Drain 1 LLQ Bulb 19 Fr. (Removed)   Site Description Healing 02/25/23 2045   Dressing Status Clean;Dry;Intact 02/26/23 0855   Drainage Appearance Serosanguineous 02/25/23 2045   Status To bulb suction 02/26/23 0855   Output (mL) 0 mL 02/26/23 0855       [REMOVED] Urethral Catheter Silicone 16 Fr. (Removed)   Daily Indications Selected surgeries ( tract, abdomen) 02/23/23 2045   Site Assessment Clean;Skin intact 02/23/23 0945   Collection Container Standard drainage bag 02/23/23 2045   Securement Method Securing device 02/23/23 2045   Catheter care complete Yes 02/23/23 0955   Output (mL) 300 mL 02/23/23 1805       Findings: Persistent obstruction of the right ureter.  No evidence of any extravasation of contrast but narrowing persists in the right proximal to mid segment necessitating stent replacement.    Complications: None apparent    Indications: 72-year-old female with an obstructed right kidney now presents for cystoscopy stent removal retrogrades possible stent change.    Procedure: Patient was taken the operative suite given general anesthesia.  Placed in comfortable supine then lithotomy position.  Prepped and draped in sterile fashion.  Surgical timeout was performed.  Cystoscope inserted.  The bladder was unremarkable.  The  stent was visualized and pulled out.  Pollick catheter was placed and a retrograde pyelogram was performed using a 5 British open tipped Pollick catheter.  This showed a normal caliber distal ureter mid ureter and then very little dye was seen going up the proximal ureter.  I pushed the ureteral catheter up pushing more dye just could not delineate the ureter well enough.  I tried passing a guidewire if this was unsuccessful.  I passed the flexible ureteroscope over the wire visualized the entire ureter then once I got up to the proximal segment where this narrowing was this persistent narrowed segment with a little deviation laterally but I was able to push through this and get into the proximal ureter in the pelvis which was dilated.  Guidewire was replaced.  The Pollick catheter placed over the wire a retrograde pyelogram showed this dilated segment.  The guidewire was replaced.  A 6 British by 26 cm stent was then placed in the right collecting system.  We will leave this in for at least another 6 weeks and then assess whether this ureter may need further surgical correction or just stent removal.      Paul Antonio MD     Date: 3/29/2023  Time: 10:04 PATRICKT

## 2023-03-29 NOTE — ANESTHESIA PREPROCEDURE EVALUATION
Anesthesia Evaluation     Patient summary reviewed and Nursing notes reviewed   history of anesthetic complications: PONV  NPO Solid Status: > 6 hours  NPO Liquid Status: > 2 hours           Airway   Mallampati: II  TM distance: >3 FB  Neck ROM: full  Dental - normal exam     Pulmonary    (-) COPD, asthma, sleep apnea, not a smoker  Cardiovascular   Exercise tolerance: good (4-7 METS)    ECG reviewed    (+) hyperlipidemia,   (-) valvular problems/murmurs, CAD, dysrhythmias, angina, cardiac stents      Neuro/Psych  (+) psychiatric history Depression,    (-) seizures, CVA, tremors, weakness  GI/Hepatic/Renal/Endo    (+) obesity,  GERD,  liver disease fatty liver disease, renal disease stones, thyroid problem hypothyroidism  (-) diabetes    ROS Comment: Small bowel obstruction    Musculoskeletal     Abdominal    Substance History      OB/GYN          Other   arthritis,                        Anesthesia Plan    ASA 3     general       Anesthetic plan, risks, benefits, and alternatives have been provided, discussed and informed consent has been obtained with: patient.    Plan discussed with CRNA.        CODE STATUS:

## 2023-04-04 ENCOUNTER — OFFICE VISIT (OUTPATIENT)
Dept: FAMILY MEDICINE CLINIC | Facility: CLINIC | Age: 73
End: 2023-04-04
Payer: MEDICARE

## 2023-04-04 VITALS
DIASTOLIC BLOOD PRESSURE: 83 MMHG | OXYGEN SATURATION: 100 % | HEART RATE: 78 BPM | BODY MASS INDEX: 28.82 KG/M2 | SYSTOLIC BLOOD PRESSURE: 128 MMHG | WEIGHT: 179.3 LBS | TEMPERATURE: 97.7 F | HEIGHT: 66 IN

## 2023-04-04 DIAGNOSIS — R10.13 EPIGASTRIC ABDOMINAL PAIN: Primary | ICD-10-CM

## 2023-04-04 PROCEDURE — 99214 OFFICE O/P EST MOD 30 MIN: CPT | Performed by: FAMILY MEDICINE

## 2023-04-04 RX ORDER — OMEPRAZOLE 20 MG/1
20 CAPSULE, DELAYED RELEASE ORAL DAILY
Qty: 30 CAPSULE | Refills: 0 | Status: SHIPPED | OUTPATIENT
Start: 2023-04-04 | End: 2023-04-17 | Stop reason: SDUPTHER

## 2023-04-04 NOTE — PROGRESS NOTES
"Chief Complaint  Abdominal Pain (Burning feeling in stomach x 1 month since surgery 2/9/23) and Diarrhea    Subjective        Stefani Watson presents to [facility]  History of Present Illness     I am seeing this patient for the first time today.  Her primary care provider is not available.      About 1 month of epigastric abdominal pain.  She states it is an acid feeling.  Pepcid not helping.  Previously on Prilosec number of months ago but switched over to Pepcid.  Continues Tums.  She had a colonoscopy with a localized colon cancer tumor.  She had a hemicolectomy.  Favorable pathology.  She had a ureter stent.  She complains of ongoing loose stool.  She stopped the magnesium.  No blood in the stool.  No dark black stools.  No vomiting.  No nausea.  She had some weight loss.  Some increased stressors.  No depression.  No NSAID use.  No alcohol use.  She is getting some activity and started to walk more.      Recent antibiotic use prescribed by urology.  States the loose stools ongoing but improving.    Objective   Vital Signs:  /83   Pulse 78   Temp 97.7 °F (36.5 °C) (Temporal)   Ht 167.6 cm (66\")   Wt 81.3 kg (179 lb 4.8 oz)   SpO2 100%   BMI 28.94 kg/m²   Estimated body mass index is 28.94 kg/m² as calculated from the following:    Height as of this encounter: 167.6 cm (66\").    Weight as of this encounter: 81.3 kg (179 lb 4.8 oz).             Physical Exam  Constitutional:       General: She is not in acute distress.     Appearance: She is not diaphoretic.   Cardiovascular:      Rate and Rhythm: Normal rate.   Pulmonary:      Effort: Pulmonary effort is normal.   Abdominal:      Comments: She has some mild epigastric pain to deep palpation.  She is sore around the surgical incision sites which appear unremarkable.  The abdomen is soft.  No guarding.  No rigidity.        Result Review :                   Assessment and Plan   Diagnoses and all orders for this visit:    1. " Epigastric abdominal pain (Primary)    Other orders  -     omeprazole (priLOSEC) 20 MG capsule; Take 1 capsule by mouth Daily.  Dispense: 30 capsule; Refill: 0      Epigastric abdominal pain.  New problem.  Prognosis unknown.      At this time most likely cause is gastritis or even peptic ulcer disease.  Differential diagnosis includes pain related to previous surgery.  Less likely pancreatitis.  Holding off on lab work at this time.  I am going to treat her empirically with omeprazole 20 mg a day for 30 days.  And then I want her to go back to the [facility].  She will be seeing her surgeon in a couple weeks.  If the abdominal pain persists, will need further work-up.  In addition if abdominal pain persist, EGD likely in order.  Patient will follow-up with her primary care physician otherwise.    Ongoing loose stool.  Improving she states.  Recent antibiotic use.  Holding off C. difficile testing at this time.         Follow Up   No follow-ups on file.  Patient was given instructions and counseling regarding her condition or for health maintenance advice. Please see specific information pulled into the AVS if appropriate.

## 2023-04-17 ENCOUNTER — OFFICE VISIT (OUTPATIENT)
Dept: SURGERY | Facility: CLINIC | Age: 73
End: 2023-04-17
Payer: MEDICARE

## 2023-04-17 ENCOUNTER — PREP FOR SURGERY (OUTPATIENT)
Dept: OTHER | Facility: HOSPITAL | Age: 73
End: 2023-04-17
Payer: MEDICARE

## 2023-04-17 DIAGNOSIS — R10.13 EPIGASTRIC ABDOMINAL PAIN: Primary | ICD-10-CM

## 2023-04-17 PROCEDURE — 1160F RVW MEDS BY RX/DR IN RCRD: CPT | Performed by: SURGERY

## 2023-04-17 PROCEDURE — 1159F MED LIST DOCD IN RCRD: CPT | Performed by: SURGERY

## 2023-04-17 PROCEDURE — 99024 POSTOP FOLLOW-UP VISIT: CPT | Performed by: SURGERY

## 2023-04-17 RX ORDER — PANTOPRAZOLE SODIUM 40 MG/1
40 TABLET, DELAYED RELEASE ORAL DAILY
Qty: 30 TABLET | Refills: 1 | Status: SHIPPED | OUTPATIENT
Start: 2023-04-17 | End: 2023-04-21

## 2023-04-17 RX ORDER — FAMOTIDINE 20 MG/1
20 TABLET, FILM COATED ORAL NIGHTLY PRN
Qty: 60 TABLET | Refills: 1 | Status: SHIPPED | OUTPATIENT
Start: 2023-04-17

## 2023-04-21 RX ORDER — PANTOPRAZOLE SODIUM 40 MG/1
40 TABLET, DELAYED RELEASE ORAL DAILY
Qty: 90 TABLET | Refills: 0 | Status: SHIPPED | OUTPATIENT
Start: 2023-04-21 | End: 2024-04-20

## 2023-05-18 RX ORDER — SULFAMETHOXAZOLE AND TRIMETHOPRIM 400; 80 MG/1; MG/1
1 TABLET ORAL
COMMUNITY
Start: 2023-05-04

## 2023-05-18 RX ORDER — CALCITRIOL 0.25 UG/1
0.25 CAPSULE, LIQUID FILLED ORAL DAILY
Qty: 90 CAPSULE | Refills: 1 | OUTPATIENT
Start: 2023-05-18

## 2023-05-19 ENCOUNTER — ANESTHESIA EVENT (OUTPATIENT)
Dept: PERIOP | Facility: HOSPITAL | Age: 73
End: 2023-05-19
Payer: MEDICARE

## 2023-05-19 ENCOUNTER — ANESTHESIA (OUTPATIENT)
Dept: PERIOP | Facility: HOSPITAL | Age: 73
End: 2023-05-19
Payer: MEDICARE

## 2023-05-19 ENCOUNTER — HOSPITAL ENCOUNTER (OUTPATIENT)
Facility: HOSPITAL | Age: 73
Setting detail: HOSPITAL OUTPATIENT SURGERY
Discharge: HOME | End: 2023-05-19
Attending: UROLOGY | Admitting: UROLOGY
Payer: MEDICARE

## 2023-05-19 ENCOUNTER — APPOINTMENT (OUTPATIENT)
Dept: GENERAL RADIOLOGY | Facility: HOSPITAL | Age: 73
End: 2023-05-19
Payer: MEDICARE

## 2023-05-19 VITALS
RESPIRATION RATE: 18 BRPM | OXYGEN SATURATION: 100 % | WEIGHT: 178 LBS | TEMPERATURE: 97.6 F | HEIGHT: 67 IN | DIASTOLIC BLOOD PRESSURE: 85 MMHG | SYSTOLIC BLOOD PRESSURE: 152 MMHG | HEART RATE: 53 BPM | BODY MASS INDEX: 27.94 KG/M2

## 2023-05-19 PROCEDURE — 25010000002 DEXAMETHASONE PER 1 MG: Performed by: NURSE ANESTHETIST, CERTIFIED REGISTERED

## 2023-05-19 PROCEDURE — 25010000002 PROPOFOL 10 MG/ML EMULSION: Performed by: NURSE ANESTHETIST, CERTIFIED REGISTERED

## 2023-05-19 PROCEDURE — 25010000002 ONDANSETRON PER 1 MG: Performed by: ANESTHESIOLOGY

## 2023-05-19 PROCEDURE — 25010000002 FENTANYL CITRATE (PF) 50 MCG/ML SOLUTION: Performed by: ANESTHESIOLOGY

## 2023-05-19 PROCEDURE — 74420 UROGRAPHY RTRGR +-KUB: CPT

## 2023-05-19 PROCEDURE — 25010000002 CEFAZOLIN IN DEXTROSE 2-4 GM/100ML-% SOLUTION: Performed by: UROLOGY

## 2023-05-19 RX ORDER — CEFAZOLIN SODIUM 2 G/100ML
2 INJECTION, SOLUTION INTRAVENOUS ONCE
Status: COMPLETED | OUTPATIENT
Start: 2023-05-19 | End: 2023-05-19

## 2023-05-19 RX ORDER — EPHEDRINE SULFATE 50 MG/ML
5 INJECTION, SOLUTION INTRAVENOUS ONCE AS NEEDED
Status: DISCONTINUED | OUTPATIENT
Start: 2023-05-19 | End: 2023-05-19 | Stop reason: HOSPADM

## 2023-05-19 RX ORDER — SODIUM CHLORIDE, SODIUM LACTATE, POTASSIUM CHLORIDE, CALCIUM CHLORIDE 600; 310; 30; 20 MG/100ML; MG/100ML; MG/100ML; MG/100ML
9 INJECTION, SOLUTION INTRAVENOUS CONTINUOUS
Status: DISCONTINUED | OUTPATIENT
Start: 2023-05-19 | End: 2023-05-19 | Stop reason: HOSPADM

## 2023-05-19 RX ORDER — ONDANSETRON 2 MG/ML
4 INJECTION INTRAMUSCULAR; INTRAVENOUS ONCE
Status: COMPLETED | OUTPATIENT
Start: 2023-05-19 | End: 2023-05-19

## 2023-05-19 RX ORDER — PROMETHAZINE HYDROCHLORIDE 25 MG/1
25 TABLET ORAL ONCE AS NEEDED
Status: DISCONTINUED | OUTPATIENT
Start: 2023-05-19 | End: 2023-05-19 | Stop reason: HOSPADM

## 2023-05-19 RX ORDER — LABETALOL HYDROCHLORIDE 5 MG/ML
5 INJECTION, SOLUTION INTRAVENOUS
Status: DISCONTINUED | OUTPATIENT
Start: 2023-05-19 | End: 2023-05-19 | Stop reason: HOSPADM

## 2023-05-19 RX ORDER — LIDOCAINE HYDROCHLORIDE 20 MG/ML
INJECTION, SOLUTION INFILTRATION; PERINEURAL AS NEEDED
Status: DISCONTINUED | OUTPATIENT
Start: 2023-05-19 | End: 2023-05-19 | Stop reason: SURG

## 2023-05-19 RX ORDER — FLUMAZENIL 0.1 MG/ML
0.2 INJECTION INTRAVENOUS AS NEEDED
Status: DISCONTINUED | OUTPATIENT
Start: 2023-05-19 | End: 2023-05-19 | Stop reason: HOSPADM

## 2023-05-19 RX ORDER — IPRATROPIUM BROMIDE AND ALBUTEROL SULFATE 2.5; .5 MG/3ML; MG/3ML
3 SOLUTION RESPIRATORY (INHALATION) ONCE AS NEEDED
Status: DISCONTINUED | OUTPATIENT
Start: 2023-05-19 | End: 2023-05-19 | Stop reason: HOSPADM

## 2023-05-19 RX ORDER — PROMETHAZINE HYDROCHLORIDE 25 MG/1
25 SUPPOSITORY RECTAL ONCE AS NEEDED
Status: DISCONTINUED | OUTPATIENT
Start: 2023-05-19 | End: 2023-05-19 | Stop reason: HOSPADM

## 2023-05-19 RX ORDER — SODIUM CHLORIDE 0.9 % (FLUSH) 0.9 %
3 SYRINGE (ML) INJECTION EVERY 12 HOURS SCHEDULED
Status: DISCONTINUED | OUTPATIENT
Start: 2023-05-19 | End: 2023-05-19 | Stop reason: HOSPADM

## 2023-05-19 RX ORDER — DEXAMETHASONE SODIUM PHOSPHATE 4 MG/ML
INJECTION, SOLUTION INTRA-ARTICULAR; INTRALESIONAL; INTRAMUSCULAR; INTRAVENOUS; SOFT TISSUE AS NEEDED
Status: DISCONTINUED | OUTPATIENT
Start: 2023-05-19 | End: 2023-05-19 | Stop reason: SURG

## 2023-05-19 RX ORDER — HYDROCODONE BITARTRATE AND ACETAMINOPHEN 5; 325 MG/1; MG/1
1 TABLET ORAL ONCE AS NEEDED
Status: DISCONTINUED | OUTPATIENT
Start: 2023-05-19 | End: 2023-05-19 | Stop reason: HOSPADM

## 2023-05-19 RX ORDER — LIDOCAINE HYDROCHLORIDE 10 MG/ML
0.5 INJECTION, SOLUTION EPIDURAL; INFILTRATION; INTRACAUDAL; PERINEURAL ONCE AS NEEDED
Status: DISCONTINUED | OUTPATIENT
Start: 2023-05-19 | End: 2023-05-19 | Stop reason: HOSPADM

## 2023-05-19 RX ORDER — HYDRALAZINE HYDROCHLORIDE 20 MG/ML
5 INJECTION INTRAMUSCULAR; INTRAVENOUS
Status: DISCONTINUED | OUTPATIENT
Start: 2023-05-19 | End: 2023-05-19 | Stop reason: HOSPADM

## 2023-05-19 RX ORDER — FAMOTIDINE 10 MG/ML
20 INJECTION, SOLUTION INTRAVENOUS ONCE
Status: COMPLETED | OUTPATIENT
Start: 2023-05-19 | End: 2023-05-19

## 2023-05-19 RX ORDER — HYDROCODONE BITARTRATE AND ACETAMINOPHEN 7.5; 325 MG/1; MG/1
1 TABLET ORAL EVERY 4 HOURS PRN
Status: DISCONTINUED | OUTPATIENT
Start: 2023-05-19 | End: 2023-05-19 | Stop reason: HOSPADM

## 2023-05-19 RX ORDER — HYDROMORPHONE HYDROCHLORIDE 1 MG/ML
0.25 INJECTION, SOLUTION INTRAMUSCULAR; INTRAVENOUS; SUBCUTANEOUS
Status: DISCONTINUED | OUTPATIENT
Start: 2023-05-19 | End: 2023-05-19 | Stop reason: HOSPADM

## 2023-05-19 RX ORDER — PROPOFOL 10 MG/ML
VIAL (ML) INTRAVENOUS AS NEEDED
Status: DISCONTINUED | OUTPATIENT
Start: 2023-05-19 | End: 2023-05-19 | Stop reason: SURG

## 2023-05-19 RX ORDER — DIPHENHYDRAMINE HYDROCHLORIDE 50 MG/ML
12.5 INJECTION INTRAMUSCULAR; INTRAVENOUS
Status: DISCONTINUED | OUTPATIENT
Start: 2023-05-19 | End: 2023-05-19 | Stop reason: HOSPADM

## 2023-05-19 RX ORDER — SODIUM CHLORIDE 0.9 % (FLUSH) 0.9 %
3-10 SYRINGE (ML) INJECTION AS NEEDED
Status: DISCONTINUED | OUTPATIENT
Start: 2023-05-19 | End: 2023-05-19 | Stop reason: HOSPADM

## 2023-05-19 RX ORDER — DROPERIDOL 2.5 MG/ML
0.62 INJECTION, SOLUTION INTRAMUSCULAR; INTRAVENOUS
Status: DISCONTINUED | OUTPATIENT
Start: 2023-05-19 | End: 2023-05-19 | Stop reason: HOSPADM

## 2023-05-19 RX ORDER — NALOXONE HCL 0.4 MG/ML
0.2 VIAL (ML) INJECTION AS NEEDED
Status: DISCONTINUED | OUTPATIENT
Start: 2023-05-19 | End: 2023-05-19 | Stop reason: HOSPADM

## 2023-05-19 RX ORDER — ONDANSETRON 2 MG/ML
4 INJECTION INTRAMUSCULAR; INTRAVENOUS ONCE AS NEEDED
Status: DISCONTINUED | OUTPATIENT
Start: 2023-05-19 | End: 2023-05-19 | Stop reason: HOSPADM

## 2023-05-19 RX ORDER — FENTANYL CITRATE 50 UG/ML
50 INJECTION, SOLUTION INTRAMUSCULAR; INTRAVENOUS ONCE AS NEEDED
Status: DISCONTINUED | OUTPATIENT
Start: 2023-05-19 | End: 2023-05-19 | Stop reason: HOSPADM

## 2023-05-19 RX ORDER — FENTANYL CITRATE 50 UG/ML
25 INJECTION, SOLUTION INTRAMUSCULAR; INTRAVENOUS
Status: DISCONTINUED | OUTPATIENT
Start: 2023-05-19 | End: 2023-05-19 | Stop reason: HOSPADM

## 2023-05-19 RX ORDER — PANTOPRAZOLE SODIUM 40 MG/1
40 TABLET, DELAYED RELEASE ORAL DAILY
Qty: 90 TABLET | Refills: 0 | Status: SHIPPED | OUTPATIENT
Start: 2023-05-19 | End: 2024-05-18

## 2023-05-19 RX ORDER — FENTANYL CITRATE 50 UG/ML
INJECTION, SOLUTION INTRAMUSCULAR; INTRAVENOUS AS NEEDED
Status: DISCONTINUED | OUTPATIENT
Start: 2023-05-19 | End: 2023-05-19 | Stop reason: SURG

## 2023-05-19 RX ORDER — ONDANSETRON 2 MG/ML
INJECTION INTRAMUSCULAR; INTRAVENOUS AS NEEDED
Status: DISCONTINUED | OUTPATIENT
Start: 2023-05-19 | End: 2023-05-19 | Stop reason: SURG

## 2023-05-19 RX ADMIN — FENTANYL CITRATE 50 MCG: 50 INJECTION, SOLUTION INTRAMUSCULAR; INTRAVENOUS at 14:41

## 2023-05-19 RX ADMIN — FAMOTIDINE 20 MG: 10 INJECTION INTRAVENOUS at 14:12

## 2023-05-19 RX ADMIN — ONDANSETRON 4 MG: 2 INJECTION INTRAMUSCULAR; INTRAVENOUS at 14:40

## 2023-05-19 RX ADMIN — DEXAMETHASONE SODIUM PHOSPHATE 4 MG: 4 INJECTION, SOLUTION INTRA-ARTICULAR; INTRALESIONAL; INTRAMUSCULAR; INTRAVENOUS; SOFT TISSUE at 14:30

## 2023-05-19 RX ADMIN — ONDANSETRON HYDROCHLORIDE 4 MG: 2 SOLUTION INTRAMUSCULAR; INTRAVENOUS at 14:12

## 2023-05-19 RX ADMIN — LIDOCAINE HYDROCHLORIDE 80 MG: 20 INJECTION, SOLUTION INFILTRATION; PERINEURAL at 14:24

## 2023-05-19 RX ADMIN — PROPOFOL 170 MG: 10 INJECTION, EMULSION INTRAVENOUS at 14:24

## 2023-05-19 RX ADMIN — CEFAZOLIN SODIUM 2 G: 2 INJECTION, SOLUTION INTRAVENOUS at 14:16

## 2023-05-19 RX ADMIN — SODIUM CHLORIDE, POTASSIUM CHLORIDE, SODIUM LACTATE AND CALCIUM CHLORIDE 9 ML/HR: 600; 310; 30; 20 INJECTION, SOLUTION INTRAVENOUS at 14:11

## 2023-05-19 NOTE — ANESTHESIA POSTPROCEDURE EVALUATION
"Patient: Stefani Watson    Procedure Summary     Date: 05/19/23 Room / Location: [facility] OR 01 / [facility] MAIN OR    Anesthesia Start: 1422 Anesthesia Stop: 1453    Procedure: CYSTOSCOPY, BIlateral retrogradesRETROGRADE PYELOGRAM, RIGHT stent removal (Left) Diagnosis:       Hydronephrosis due to obstruction of ureter      (Rigth Hydronephrosis)    Surgeons: Paul Antonio MD Provider: Chase Hawkins MD    Anesthesia Type: general ASA Status: 3          Anesthesia Type: general    Vitals  Vitals Value Taken Time   /76 05/19/23 1515   Temp 36.4 °C (97.6 °F) 05/19/23 1450   Pulse 51 05/19/23 1528   Resp 17 05/19/23 1515   SpO2 100 % 05/19/23 1528   Vitals shown include unvalidated device data.        Post Anesthesia Care and Evaluation    Patient location during evaluation: PACU  Patient participation: complete - patient participated  Level of consciousness: awake and alert  Pain management: adequate    Airway patency: patent  Anesthetic complications: No anesthetic complications  PONV Status: controlled  Cardiovascular status: acceptable and hemodynamically stable  Respiratory status: acceptable  Hydration status: acceptable    Comments: /76   Pulse 65   Temp 36.4 °C (97.6 °F) (Oral)   Resp 17   Ht 170.2 cm (67\")   Wt 80.7 kg (178 lb)   LMP  (LMP Unknown)   SpO2 98%   BMI 27.88 kg/m²       "

## 2023-05-19 NOTE — ANESTHESIA PROCEDURE NOTES
Airway  Urgency: elective    Date/Time: 5/19/2023 2:27 PM  Airway not difficult    General Information and Staff    Patient location during procedure: OR  Anesthesiologist: Chase Hawkins MD  CRNA/CAA: Isabel Gonzalez CRNA    Indications and Patient Condition  Indications for airway management: airway protection    Preoxygenated: yes  MILS not maintained throughout  Mask difficulty assessment: 1 - vent by mask    Final Airway Details  Final airway type: supraglottic airway      Successful airway: LMA  Size 4     Number of attempts at approach: 1  Assessment: lips, teeth, and gum same as pre-op

## 2023-05-19 NOTE — OP NOTE
URETEROSCOPY LASER LITHOTRIPSY WITH STENT INSERTION  Procedure Note    Stefani Watson  5/19/2023    Pre-op Diagnosis:   Rigth Hydronephrosis    Post-op Diagnosis:     Post-Op Diagnosis Codes:     * Hydronephrosis due to obstruction of ureter [N13.1]    Procedure(s):  CYSTOSCOPY, BIlateral retrogradesRETROGRADE PYELOGRAM, RIGHT stent removal    Surgeon(s):  Paul Antonio MD     Anesthesia: General    Staff:   Circulator: Sonny Haque RN; Mana Rose RN    Estimated Blood Loss: none    Specimens:                * No orders in the log *      Drains:   Ureteral Drain/Stent Right ureter 6 Fr. (Active)       Findings: Mild restriction in the right and mid ureter.  No evidence of extravasation.    Complications: None apparent    Indications: 72-year-old female with right hydronephrosis and indwelling stent placed after colectomy now presents for stent removal retrogrades possible replacement.    Procedure: Patient was taken the operative suite given general anesthesia.  Placed in comfortable supine then lithotomy position.  Prepped and draped in sterile fashion.  Panendoscopy was performed after surgical timeout.  The bladder was unremarkable.  The stent was visualized and pulled out intact.  Bilateral treated pyelograms were performed with an open-ended ureteral catheter.  The left retrograde was normal.  The pelvis was normal calyces were sharp and well delineated.  I drained slowly but no signs of any obstruction.  The right retrograde showed a normal caliber ureter low bit of narrowing distally and then filling the distal and mid ureteral segments.  There is some narrowing of the proximal ureter and then it promptly opened up the pelvis was slightly dilated but I could delineate the whole ureter and I could pass the ureteral catheter up easily.  I elected leave her stent out.  I did not pass up the ureteroscope just because of the rigidity of the ureter and we will see how she does without her  stent.  She was awoken and taken to recovery in stable condition.      Paul Antonio MD     Date: 5/19/2023  Time: 15:09 EDT

## 2023-05-19 NOTE — H&P
[facility] Surgical History and Physical    Patient Care Team:  Kaushik Watters MD as PCP - General (Internal Medicine)    Chief complaint mild right flank pain    Subjective     Patient is a 72 y.o. female presents with right-sided indwelling stent placed for ureteral obstruction.  This was found after she had a colectomy.  She had no clear division of the ureter and no evidence of a urinoma.  A stent was placed and she has done well..  She now presents for stent removal.     Review of Systems   Pertinent items are noted in HPI    Past Medical History:   Diagnosis Date   • Colon polyp 1/09/2023   • Colonic mass    • Diverticulitis    • Diverticulosis 1/09/2023   • [place] (hard of hearing)     SIGNIFICANT-BILAT HEARING AIDS   • Hydronephrosis     RIGHT   • Hyperlipidemia    • Hypothyroidism 9/11 /2001    Thyroid removed   • OA (osteoarthritis) of hip    • PONV (postoperative nausea and vomiting)    • Stress incontinence    • Thyroid cancer     RADIOACTIVE IODINE     Past Surgical History:   Procedure Laterality Date   • CATARACT EXTRACTION, BILATERAL     • COLON RESECTION N/A 2/9/2023    Procedure: laparoscopic right hemicolectomyconverted to open  bilateral ooperectomy;  Surgeon: Norma Barillas MD;  Location: [facility] OR;  Service: General;  Laterality: N/A;   • COLON RESECTION N/A 2/22/2023    Procedure: DIAGNOSTIC LAPAROSCOPY WITH OPEN ILLEO-COLOIC RESECTION;  Surgeon: Norma Barillas MD;  Location: [facility] OR;  Service: General;  Laterality: N/A;   • COLONOSCOPY N/A 01/09/2023    Procedure: COLONOSCOPY to cecum with biopsy of ileo cecal valve polyp;  Surgeon: Norma Barillas MD;  Location: [facility] ENDOSCOPY;  Service: General;  Laterality: N/A;  PRE - screening  POST - diverticulosis, ileo cecal valve polyp   • CYSTOSCOPY W/ URETERAL STENT PLACEMENT Right 2/14/2023    Procedure: CYSTOSCOPY URETERAL CATHETER/STENT INSERTION, Retrograde Pyelogram;  Surgeon: Paul Antonio MD;  Location: [facility]  OR;  Service: Urology;  Laterality: Right;   • CYSTOSCOPY W/ URETERAL STENT PLACEMENT Right 3/29/2023    Procedure: CYSTOSCOPY , RIGHT STENT exchange, right ureteroscopy, RETROGRADE PYELOGRAM;  Surgeon: Paul Antonio MD;  Location: [facility] OR;  Service: Urology;  Laterality: Right;   • EYE SURGERY      Cataracts   • LAPAROSCOPIC TUBAL LIGATION     • ROTATOR CUFF REPAIR Left    • STAPEDECTOMY Right    • THYROIDECTOMY Bilateral    • TUBAL ABDOMINAL LIGATION       Family History   Problem Relation Age of Onset   • Heart disease Mother    • Cancer Sister    • Ovarian cancer Sister    • Cancer Maternal Grandmother    • Breast cancer Maternal Grandmother    • Malig Hyperthermia Neg Hx      Social History     Tobacco Use   • Smoking status: Never   • Smokeless tobacco: Never   Vaping Use   • Vaping Use: Never used   Substance Use Topics   • Alcohol use: Yes     Comment: Occasionally   • Drug use: Never       Meds:  Medications Prior to Admission   Medication Sig Dispense Refill Last Dose   • calcitriol (ROCALTROL) 0.25 MCG capsule Take 1 capsule by mouth Daily. 90 capsule 1 Past Week   • calcium carbonate (OS-DIGNA) 600 MG tablet Take 2 tablets by mouth Daily.   Past Week   • citalopram (CeleXA) 20 MG tablet TAKE 1 TABLET BY MOUTH DAILY 90 tablet 1 5/18/2023   • famotidine (PEPCID) 20 MG tablet Take 1 tablet by mouth At Night As Needed for Indigestion or Heartburn. 60 tablet 1 Past Week   • latanoprost (XALATAN) 0.005 % ophthalmic solution Administer 1 drop to both eyes Every Night.   5/18/2023   • levothyroxine (SYNTHROID, LEVOTHROID) 125 MCG tablet TAKE 1 TABLET BY MOUTH EVERY MORNING (Patient taking differently: Take 1 tablet by mouth Every Morning.) 90 tablet 1 5/19/2023 at 0630   • pantoprazole (PROTONIX) 40 MG EC tablet TAKE 1 TABLET BY MOUTH DAILY 90 tablet 0 5/18/2023 at 0800   • pravastatin (PRAVACHOL) 40 MG tablet TAKE 1 TABLET BY MOUTH DAILY (Patient taking differently: Take 1 tablet by mouth Every  "Night.) 90 tablet 3 5/18/2023   • sulfamethoxazole-trimethoprim (BACTRIM,SEPTRA) 400-80 MG tablet Take 1 tablet by mouth every night at bedtime.   5/18/2023       Allergies:  Blue dyes (parenteral), Other, and Simvastatin    Debilities:  None    Objective     Vital Signs  Temp:  [98 °F (36.7 °C)] 98 °F (36.7 °C)  Heart Rate:  [66] 66  Resp:  [16] 16  BP: (167)/(73) 167/73  No intake or output data in the 24 hours ending 05/19/23 1356  Flowsheet Rows    Flowsheet Row First Filed Value   Admission Height 170.2 cm (67\") Documented at 05/18/2023 1110   Admission Weight 80.3 kg (177 lb) Documented at 05/18/2023 1110           Physical Exam:     General Appearance:    Alert, cooperative, NAD   HEENT:    No trauma, pupils reactive, hearing intact   Back:     No CVA tenderness   Lungs:     Respirations unlabored, no wheezing    Heart:    RRR, intact peripheral pulses   Abdomen:     Soft, NDNT, no masses, no guarding   :   Deferred   Extremities:   No edema, no deformity   Lymphatic:   No neck or groin LAD   Skin:   No bleeding, bruising or rashes   Neuro/Psych:   Orientation intact, mood/affect pleasant, no focal findings     Results Review:    I reviewed the patient's new clinical results.  Results for orders placed or performed in visit on 03/27/23   Basic Metabolic Panel    Specimen: Blood   Result Value Ref Range    Glucose 105 (H) 65 - 99 mg/dL    BUN 16 8 - 23 mg/dL    Creatinine 0.86 0.57 - 1.00 mg/dL    Sodium 139 136 - 145 mmol/L    Potassium 3.8 3.5 - 5.2 mmol/L    Chloride 104 98 - 107 mmol/L    CO2 23.0 22.0 - 29.0 mmol/L    Calcium 8.0 (L) 8.6 - 10.5 mg/dL    BUN/Creatinine Ratio 18.6 7.0 - 25.0    Anion Gap 12.0 5.0 - 15.0 mmol/L    eGFR 71.0 >60.0 mL/min/1.73   CBC (No Diff)    Specimen: Blood   Result Value Ref Range    WBC 8.58 3.40 - 10.80 10*3/mm3    RBC 4.08 3.77 - 5.28 10*6/mm3    Hemoglobin 9.8 (L) 12.0 - 15.9 g/dL    Hematocrit 31.3 (L) 34.0 - 46.6 %    MCV 76.7 (L) 79.0 - 97.0 fL    MCH 24.0 (L) " 26.6 - 33.0 pg    MCHC 31.3 (L) 31.5 - 35.7 g/dL    RDW 16.7 (H) 12.3 - 15.4 %    RDW-SD 46.7 37.0 - 54.0 fl    MPV 9.8 6.0 - 12.0 fL    Platelets 437 140 - 450 10*3/mm3        Assessment:  Right hydronephrosis    Plan:    Cystoscopy stent removal right ureteroscopy possible right stent placement    I discussed the patient's findings and my recommendations with patient.   Risks, complications, outcomes and alternatives discussed with the patient at the bedside and office.    Paul Antonio MD  05/19/23  13:56 EDT

## 2023-05-19 NOTE — ANESTHESIA PREPROCEDURE EVALUATION
Anesthesia Evaluation     Patient summary reviewed and Nursing notes reviewed   history of anesthetic complications: PONV  NPO Solid Status: > 8 hours  NPO Liquid Status: > 2 hours           Airway   Mallampati: II  TM distance: >3 FB  Neck ROM: full  no difficulty expected  Dental - normal exam     Pulmonary - negative pulmonary ROS and normal exam   (-) COPD, asthma, not a smoker, lung cancer  Cardiovascular - normal exam  Exercise tolerance: good (4-7 METS)    ECG reviewed  Rhythm: regular  Rate: normal    (+) hyperlipidemia,   (-) hypertension, valvular problems/murmurs, past MI, CAD, dysrhythmias, angina, CHF, cardiac stents, CABG, pericardial effusion      Neuro/Psych  (+) psychiatric history Depression,    (-) seizures, TIA, CVA  GI/Hepatic/Renal/Endo    (+)   liver disease fatty liver disease, renal disease stones, thyroid problem hypothyroidism and thyroid cancer  (-) hiatal hernia, GERD, PUD, hepatitis, GI bleed    Musculoskeletal     Abdominal  - normal exam   Substance History - negative use     OB/GYN          Other   arthritis,    history of cancer remission      Other Comment: Hx/o colon and thyriod ca                  Anesthesia Plan    ASA 3     general     intravenous induction     Anesthetic plan, risks, benefits, and alternatives have been provided, discussed and informed consent has been obtained with: patient.    Plan discussed with CRNA.        CODE STATUS:

## 2023-05-22 ENCOUNTER — TELEPHONE (OUTPATIENT)
Dept: INTERNAL MEDICINE | Facility: CLINIC | Age: 73
End: 2023-05-22

## 2023-05-22 NOTE — TELEPHONE ENCOUNTER
Caller: Stefani Watson    Relationship: Self    Best call back number: [phone]    What orders are you requesting (i.e. lab or imaging): LABS TO CHECK CHOLESTEROL    In what timeframe would the patient need to come in: ASAP    Where will you receive your lab/imaging services: IN OFFICE    Additional notes: PATIENT WOULD LIKE TO HAVE THIS DONE THE MORNING OF 05/25/23.    PLEASE CALL.

## 2023-05-23 NOTE — TELEPHONE ENCOUNTER
Spoke with patient and informed her that we cannot enter lab orders for her until we see her in office since she is a new patient.

## 2023-05-25 ENCOUNTER — OFFICE VISIT (OUTPATIENT)
Dept: INTERNAL MEDICINE | Facility: CLINIC | Age: 73
End: 2023-05-25
Payer: MEDICARE

## 2023-05-25 VITALS
HEIGHT: 67 IN | OXYGEN SATURATION: 97 % | HEART RATE: 88 BPM | TEMPERATURE: 98.2 F | SYSTOLIC BLOOD PRESSURE: 138 MMHG | DIASTOLIC BLOOD PRESSURE: 85 MMHG | BODY MASS INDEX: 28.25 KG/M2 | WEIGHT: 180 LBS

## 2023-05-25 DIAGNOSIS — E20.8 OTHER HYPOPARATHYROIDISM: ICD-10-CM

## 2023-05-25 DIAGNOSIS — E03.9 HYPOTHYROIDISM, UNSPECIFIED TYPE: Primary | ICD-10-CM

## 2023-05-25 RX ORDER — CALCITRIOL 0.25 UG/1
0.25 CAPSULE, LIQUID FILLED ORAL DAILY
Qty: 90 CAPSULE | Refills: 1 | Status: SHIPPED | OUTPATIENT
Start: 2023-05-25

## 2023-05-25 NOTE — PROGRESS NOTES
Kaushik Watters M.D.  Internal Medicine  [facility]  [address], [address]  [place] [address]  [phone]      Chief Complaint  Establish Care, Hypothyroidism, and Hyperlipidemia (Would like labs run /)    SUBJECTIVE    History of Present Illness    Stefani Watson is a 72 y.o. female who presents to the office today as a new patient to establish care.     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 She then had Surgery twice, first 1/9/23, of an open right hemicolectomy and BSO, followed by an open ileocolic resection for SBO 2/23/23..  During her second admission, she had a ureteral/pelvic kidney leak that was stented successfully but Dr Marshall Antonio.  Recently saw Dr. Antonio for stent removal last Friday. She is on antibiotic for this.     Feels abdominal pain but this is improving. She had loose BMs that are improving. Takes magnesium as needed for constipation . She takes with calcitriol for hypoparathyroidism after thyroid surgery 20 years ago.     Thyroid cancer s/p thyroidectomy. She has been on calcitriol and magnesium since removal of her parathyroid glands. Dr. Barillas checked her intact PTH which was actually normal.  Instructed her to stop her magnesium with improvement in diarrhea. She was referred to endocrinology. Calcium is normal when corrected for albumin.     Hyperlipidemia-on pravastatin    Hepatic steatosis    Depression-on Celexa. Gets irritable without this.     Social-Walks daily with a friend. . Her grandson is getting  this summer. She is from [place].      Review of Systems    Allergies   Allergen Reactions   • Blue Dyes (Parenteral) Rash   • Other Itching and Rash     CHG wipes   • Simvastatin Myalgia        Outpatient Medications Marked as Taking for the 5/25/23 encounter (Office Visit) with Kaushik Watters MD   Medication Sig Dispense  Refill   • calcitriol (ROCALTROL) 0.25 MCG capsule Take 1 capsule by mouth Daily. 90 capsule 1   • calcium carbonate (OS-DIGNA) 600 MG tablet Take 2 tablets by mouth Daily.     • citalopram (CeleXA) 20 MG tablet TAKE 1 TABLET BY MOUTH DAILY 90 tablet 1   • famotidine (PEPCID) 20 MG tablet Take 1 tablet by mouth At Night As Needed for Indigestion or Heartburn. 60 tablet 1   • latanoprost (XALATAN) 0.005 % ophthalmic solution Administer 1 drop to both eyes Every Night.     • levothyroxine (SYNTHROID, LEVOTHROID) 125 MCG tablet TAKE 1 TABLET BY MOUTH EVERY MORNING (Patient taking differently: Take 1 tablet by mouth Every Morning.) 90 tablet 1   • Mirabegron ER (Myrbetriq) 25 MG tablet sustained-release 24 hour 24 hr tablet Take 1 tablet by mouth Daily.     • pantoprazole (PROTONIX) 40 MG EC tablet TAKE 1 TABLET BY MOUTH DAILY 90 tablet 0   • pravastatin (PRAVACHOL) 40 MG tablet TAKE 1 TABLET BY MOUTH DAILY (Patient taking differently: Take 1 tablet by mouth Every Night.) 90 tablet 3   • sulfamethoxazole-trimethoprim (BACTRIM,SEPTRA) 400-80 MG tablet Take 1 tablet by mouth every night at bedtime.     • [DISCONTINUED] calcitriol (ROCALTROL) 0.25 MCG capsule Take 1 capsule by mouth Daily. 90 capsule 1        Past Medical History:   Diagnosis Date   • Colon polyp 1/09/2023   • Colonic mass    • Diverticulitis    • Diverticulosis 1/09/2023   • [place] (hard of hearing)     SIGNIFICANT-BILAT HEARING AIDS   • Hydronephrosis     RIGHT   • Hyperlipidemia    • Hypothyroidism 9/11 /2001    Thyroid removed   • OA (osteoarthritis) of hip    • PONV (postoperative nausea and vomiting)    • Stress incontinence    • Thyroid cancer     RADIOACTIVE IODINE     Past Surgical History:   Procedure Laterality Date   • CATARACT EXTRACTION, BILATERAL     • COLON RESECTION N/A 2/9/2023    Procedure: laparoscopic right hemicolectomyconverted to open  bilateral ooperectomy;  Surgeon: Norma Barillas MD;  Location: [facility];  Service: General;   Laterality: N/A;   • COLON RESECTION N/A 2/22/2023    Procedure: DIAGNOSTIC LAPAROSCOPY WITH OPEN ILLEO-COLOIC RESECTION;  Surgeon: Norma Barillas MD;  Location: [facility] OR;  Service: General;  Laterality: N/A;   • COLONOSCOPY N/A 01/09/2023    Procedure: COLONOSCOPY to cecum with biopsy of ileo cecal valve polyp;  Surgeon: Norma Barillas MD;  Location: [facility] ENDOSCOPY;  Service: General;  Laterality: N/A;  PRE - screening  POST - diverticulosis, ileo cecal valve polyp   • CYSTOSCOPY W/ URETERAL STENT PLACEMENT Right 2/14/2023    Procedure: CYSTOSCOPY URETERAL CATHETER/STENT INSERTION, Retrograde Pyelogram;  Surgeon: Paul Antonio MD;  Location: [facility] OR;  Service: Urology;  Laterality: Right;   • CYSTOSCOPY W/ URETERAL STENT PLACEMENT Right 3/29/2023    Procedure: CYSTOSCOPY , RIGHT STENT exchange, right ureteroscopy, RETROGRADE PYELOGRAM;  Surgeon: Paul Antonio MD;  Location: [facility];  Service: Urology;  Laterality: Right;   • EYE SURGERY      Cataracts   • LAPAROSCOPIC TUBAL LIGATION     • ROTATOR CUFF REPAIR Left    • STAPEDECTOMY Right    • THYROIDECTOMY Bilateral    • TUBAL ABDOMINAL LIGATION     • URETEROSCOPY LASER LITHOTRIPSY WITH STENT INSERTION Left 5/19/2023    Procedure: CYSTOSCOPY, BIlateral retrogradesRETROGRADE PYELOGRAM, RIGHT stent removal;  Surgeon: Paul Antonio MD;  Location: [facility];  Service: Urology;  Laterality: Left;     Family History   Problem Relation Age of Onset   • Heart disease Mother         Late 70s   • Cancer Sister    • Ovarian cancer Sister    • Cancer Maternal Grandmother    • Breast cancer Maternal Grandmother    • Heart disease Maternal Grandfather    • Malig Hyperthermia Neg Hx     reports that she has never smoked. She has never used smokeless tobacco. She reports current alcohol use. She reports that she does not use drugs.    OBJECTIVE    Vital Signs:   /85   Pulse 88   Temp 98.2 °F (36.8 °C)   Ht 170.2 cm  "(67.01\")   Wt 81.6 kg (180 lb)   SpO2 97%   BMI 28.19 kg/m²     Physical Exam  Constitutional:       Appearance: Normal appearance.   Cardiovascular:      Rate and Rhythm: Normal rate and regular rhythm.      Heart sounds: Normal heart sounds. No murmur heard.  Pulmonary:      Effort: Pulmonary effort is normal.      Breath sounds: Normal breath sounds.   Abdominal:      General: Abdomen is flat. There is no distension.      Palpations: Abdomen is soft.      Tenderness: There is no abdominal tenderness.      Comments: Mild tenderness by surgical incisions   Musculoskeletal:      Right lower leg: No edema.      Left lower leg: No edema.   Skin:     General: Skin is warm and dry.   Neurological:      Mental Status: She is alert.   Psychiatric:         Mood and Affect: Mood normal.         Behavior: Behavior normal.         Thought Content: Thought content normal.            The following data was reviewed by: Kaushik Watters MD on 05/25/2023:  Common labs        2/27/2023    05:45 3/1/2023    07:09 3/27/2023    16:17   Common Labs   Glucose 119   92   105     BUN 22   15   16     Creatinine 1.28   1.16   0.86     Sodium 136   139   139     Potassium 4.6   4.4   3.8     Chloride 103   104   104     Calcium 8.6   7.6   8.0     Albumin 2.8       Total Bilirubin <0.2       Alkaline Phosphatase 79       AST (SGOT) 8       ALT (SGPT) 6       WBC   8.58     Hemoglobin   9.8     Hematocrit   31.3     Platelets   437       Data reviewed: recent hospital and surgery notes.              ASSESSMENT & PLAN     Diagnoses and all orders for this visit:    1. Hypothyroidism, unspecified type (Primary)  -     TSH Rfx On Abnormal To Free T4    2. Other hypoparathyroidism  -     calcitriol (ROCALTROL) 0.25 MCG capsule; Take 1 capsule by mouth Daily.  Dispense: 90 capsule; Refill: 1  -     Ambulatory Referral to Endocrinology      Thyroid cancer s/p thyroidectomy. She has been on calcitriol and magnesium since removal of her parathyroid " glands. Dr. Barillas checked her intact PTH which was actually normal.  Instructed her to stop her magnesium with improvement in diarrhea. She was referred to endocrinology. Calcium is normal when corrected for albumin. Continue calcitriol for now and refer to endocrinology.     TSH has not been check recently and ws abnormal on last check. Will recheck prior to refilling levothyroxine.        Health Maintenance Due   Topic Date Due   • TDAP/TD VACCINES (1 - Tdap) Never done   • ZOSTER VACCINE (2 of 2) 11/26/2020        Follow Up  Return in about 3 months (around 8/25/2023).    Patient/family had no further questions at this time and verbalized understanding of the plan discussed today.

## 2023-05-26 DIAGNOSIS — E03.9 HYPOTHYROIDISM, UNSPECIFIED TYPE: Primary | ICD-10-CM

## 2023-05-26 LAB
T4 FREE SERPL-MCNC: 1.66 NG/DL (ref 0.93–1.7)
TSH SERPL DL<=0.005 MIU/L-ACNC: 0.06 UIU/ML (ref 0.27–4.2)

## 2023-05-26 RX ORDER — LEVOTHYROXINE SODIUM 0.1 MG/1
100 TABLET ORAL
Qty: 30 TABLET | Refills: 2 | Status: SHIPPED | OUTPATIENT
Start: 2023-05-26

## 2023-05-31 RX ORDER — CITALOPRAM 20 MG/1
20 TABLET ORAL DAILY
Qty: 90 TABLET | Refills: 1 | Status: SHIPPED | OUTPATIENT
Start: 2023-05-31

## 2023-05-31 NOTE — TELEPHONE ENCOUNTER
Implanted port flushed with 10ml 0.9% sodium chloride followed by Heparin 500 units/5ml.  Needle removed from port..  Patient left ambulatory.   Rx Refill Note  Requested Prescriptions     Pending Prescriptions Disp Refills   • citalopram (CeleXA) 20 MG tablet [Pharmacy Med Name: CITALOPRAM 20MG TABLETS] 90 tablet 1     Sig: TAKE 1 TABLET BY MOUTH DAILY      Last office visit with prescribing clinician:5/23   Last telemedicine visit with prescribing clinician: Visit date not found   Next office visit with prescribing clinician: 8/23                         Would you like a call back once the refill request has been completed: [] Yes [] No    If the office needs to give you a call back, can they leave a voicemail: [] Yes [] No    Maryan Aguirre LPN  05/31/23, 07:29 EDT

## 2023-08-29 RX ORDER — LEVOTHYROXINE SODIUM 0.1 MG/1
100 TABLET ORAL
Qty: 30 TABLET | Refills: 2 | Status: SHIPPED | OUTPATIENT
Start: 2023-08-29 | End: 2023-08-31 | Stop reason: SDUPTHER

## 2023-08-31 ENCOUNTER — OFFICE VISIT (OUTPATIENT)
Dept: INTERNAL MEDICINE | Facility: CLINIC | Age: 73
End: 2023-08-31
Payer: MEDICARE

## 2023-08-31 VITALS
BODY MASS INDEX: 31.45 KG/M2 | HEIGHT: 67 IN | DIASTOLIC BLOOD PRESSURE: 72 MMHG | SYSTOLIC BLOOD PRESSURE: 115 MMHG | HEART RATE: 87 BPM | WEIGHT: 200.4 LBS | OXYGEN SATURATION: 98 %

## 2023-08-31 DIAGNOSIS — E20.8 OTHER HYPOPARATHYROIDISM: ICD-10-CM

## 2023-08-31 DIAGNOSIS — E78.00 PURE HYPERCHOLESTEROLEMIA: ICD-10-CM

## 2023-08-31 DIAGNOSIS — E03.9 HYPOTHYROIDISM, UNSPECIFIED TYPE: Primary | ICD-10-CM

## 2023-08-31 DIAGNOSIS — B35.1 NAIL FUNGUS: ICD-10-CM

## 2023-08-31 DIAGNOSIS — Z79.899 HIGH RISK MEDICATION USE: ICD-10-CM

## 2023-08-31 PROCEDURE — 99213 OFFICE O/P EST LOW 20 MIN: CPT | Performed by: STUDENT IN AN ORGANIZED HEALTH CARE EDUCATION/TRAINING PROGRAM

## 2023-08-31 RX ORDER — DEXAMETHASONE SODIUM PHOSPHATE 1 MG/ML
SOLUTION/ DROPS OPHTHALMIC
COMMUNITY
Start: 2023-08-08

## 2023-08-31 RX ORDER — LEVOTHYROXINE SODIUM 0.12 MG/1
125 TABLET ORAL
Qty: 90 TABLET | Refills: 2 | Status: SHIPPED | OUTPATIENT
Start: 2023-08-31

## 2023-08-31 RX ORDER — TAVABOROLE TOPICAL SOLUTION, 5% 43.5 MG/ML
1 SOLUTION TOPICAL DAILY
Qty: 10 ML | Refills: 2 | Status: SHIPPED | OUTPATIENT
Start: 2023-08-31

## 2023-08-31 NOTE — PROGRESS NOTES
Kaushik Watters M.D.  Internal Medicine  [facility]  [address], [address]  [place] [address]  [phone]      Chief Complaint  Hypothyroidism (3 mth F/U /)    SUBJECTIVE    History of Present Illness    Stefani Watson is a 73 y.o. female who presents to the office today as an established patient that last saw me on 5/25/2023.       Thyroid cancer s/p thyroidectomy. She has been on calcitriol and magnesium since removal of her parathyroid glands. Dr. Barillas checked her intact PTH which was actually normal.  Instructed her to stop her magnesium with improvement in diarrhea. She was referred to endocrinology. Calcium is normal when corrected for albumin.     She felt better on higher dose of levothyroxine. She has stopped biotin. She feels more fatigue. She is not sure what kind of thyroid cancer she had 20 years ago. States she has been on synthroid dose to suppress TSH. Has had levothyroxine dose decreased in past due to heart racing.     She went to ENT for ear fullness and itching. She was told she had no ear wax and was prescribed ear drops. She is still having ear fullness on occasion. Not using nasal spray now.            Review of Systems    Allergies   Allergen Reactions    Blue Dyes (Parenteral) Rash    Other Itching and Rash     CHG wipes    Simvastatin Myalgia        Outpatient Medications Marked as Taking for the 8/31/23 encounter (Office Visit) with Kaushik Watters MD   Medication Sig Dispense Refill    calcitriol (ROCALTROL) 0.25 MCG capsule Take 1 capsule by mouth Daily. 90 capsule 1    calcium carbonate (OS-DIGNA) 600 MG tablet Take 2 tablets by mouth Daily.      citalopram (CeleXA) 20 MG tablet TAKE 1 TABLET BY MOUTH DAILY 90 tablet 1    dexAMETHasone (DECADRON) 0.1 % ophthalmic solution INSTILL 4 DROPS TO AFFECTED EAR FOUR TIMES DAILY      famotidine (PEPCID) 20 MG tablet Take 1 tablet by mouth At Night As Needed for Indigestion or Heartburn. 60 tablet 1    latanoprost  (XALATAN) 0.005 % ophthalmic solution Administer 1 drop to both eyes Every Night.      levothyroxine (SYNTHROID, LEVOTHROID) 125 MCG tablet Take 1 tablet by mouth Every Morning. 90 tablet 2    Mirabegron ER (MYRBETRIQ) 25 MG tablet sustained-release 24 hour 24 hr tablet Take 1 tablet by mouth Daily.      pantoprazole (PROTONIX) 40 MG EC tablet TAKE 1 TABLET BY MOUTH DAILY 90 tablet 0    pravastatin (PRAVACHOL) 40 MG tablet TAKE 1 TABLET BY MOUTH DAILY (Patient taking differently: Take 1 tablet by mouth Every Night.) 90 tablet 3    [DISCONTINUED] levothyroxine (SYNTHROID, LEVOTHROID) 100 MCG tablet TAKE 1 TABLET BY MOUTH EVERY MORNING 30 tablet 2        Past Medical History:   Diagnosis Date    Colon polyp 1/09/2023    Colonic mass     Diverticulitis     Diverticulosis 1/09/2023    Elim IRA (hard of hearing)     SIGNIFICANT-BILAT HEARING AIDS    Hydronephrosis     RIGHT    Hyperlipidemia     Hypothyroidism 9/11 /2001    Thyroid removed    OA (osteoarthritis) of hip     PONV (postoperative nausea and vomiting)     Stress incontinence     Thyroid cancer     RADIOACTIVE IODINE     Past Surgical History:   Procedure Laterality Date    CATARACT EXTRACTION, BILATERAL      COLON RESECTION N/A 2/9/2023    Procedure: laparoscopic right hemicolectomyconverted to open  bilateral ooperectomy;  Surgeon: Norma Barillas MD;  Location: [facility] OR;  Service: General;  Laterality: N/A;    COLON RESECTION N/A 2/22/2023    Procedure: DIAGNOSTIC LAPAROSCOPY WITH OPEN ILLEO-COLOIC RESECTION;  Surgeon: Norma Barillas MD;  Location: [facility] MAIN OR;  Service: General;  Laterality: N/A;    COLONOSCOPY N/A 01/09/2023    Procedure: COLONOSCOPY to cecum with biopsy of ileo cecal valve polyp;  Surgeon: Norma aBrillas MD;  Location: [facility] ENDOSCOPY;  Service: General;  Laterality: N/A;  PRE - screening  POST - diverticulosis, ileo cecal valve polyp    CYSTOSCOPY W/ URETERAL STENT PLACEMENT Right 2/14/2023    Procedure: CYSTOSCOPY URETERAL  "CATHETER/STENT INSERTION, Retrograde Pyelogram;  Surgeon: Paul Antonio MD;  Location: [facility] OR;  Service: Urology;  Laterality: Right;    CYSTOSCOPY W/ URETERAL STENT PLACEMENT Right 3/29/2023    Procedure: CYSTOSCOPY , RIGHT STENT exchange, right ureteroscopy, RETROGRADE PYELOGRAM;  Surgeon: Paul Antonio MD;  Location: [facility] OR;  Service: Urology;  Laterality: Right;    EYE SURGERY      Cataracts    LAPAROSCOPIC TUBAL LIGATION      ROTATOR CUFF REPAIR Left     STAPEDECTOMY Right     THYROIDECTOMY Bilateral     TUBAL ABDOMINAL LIGATION      URETEROSCOPY LASER LITHOTRIPSY WITH STENT INSERTION Left 5/19/2023    Procedure: CYSTOSCOPY, BIlateral retrogradesRETROGRADE PYELOGRAM, RIGHT stent removal;  Surgeon: Paul Antonio MD;  Location: [facility] OR;  Service: Urology;  Laterality: Left;     Family History   Problem Relation Age of Onset    Heart disease Mother         Late 70s    Cancer Sister     Ovarian cancer Sister     Cancer Maternal Grandmother     Breast cancer Maternal Grandmother     Heart disease Maternal Grandfather     Malig Hyperthermia Neg Hx     reports that she has never smoked. She has never used smokeless tobacco. She reports current alcohol use. She reports that she does not use drugs.    OBJECTIVE    Vital Signs:   /72   Pulse 87   Ht 170.2 cm (67.01\")   Wt 90.9 kg (200 lb 6.4 oz)   SpO2 98%   BMI 31.38 kg/mý     Physical Exam  Constitutional:       Appearance: Normal appearance. She is normal weight.   HENT:      Right Ear: Tympanic membrane normal.      Left Ear: Tympanic membrane normal.   Cardiovascular:      Rate and Rhythm: Normal rate and regular rhythm.      Heart sounds: Normal heart sounds. No murmur heard.  Pulmonary:      Effort: Pulmonary effort is normal.      Breath sounds: Normal breath sounds.   Musculoskeletal:      Right lower leg: No edema.      Left lower leg: No edema.   Skin:     General: Skin is warm and dry.      Comments: " Discolored yellow thumbnail that is thickened.   Neurological:      Mental Status: She is alert.   Psychiatric:         Mood and Affect: Mood normal.         Behavior: Behavior normal.         Thought Content: Thought content normal.          The following data was reviewed by: Kaushik Watters MD on 08/31/2023:  Common labs          2/27/2023    05:45 3/1/2023    07:09 3/27/2023    16:17   Common Labs   Glucose 119  92  105    BUN 22  15  16    Creatinine 1.28  1.16  0.86    Sodium 136  139  139    Potassium 4.6  4.4  3.8    Chloride 103  104  104    Calcium 8.6  7.6  8.0    Albumin 2.8      Total Bilirubin <0.2      Alkaline Phosphatase 79      AST (SGOT) 8      ALT (SGPT) 6      WBC   8.58    Hemoglobin   9.8    Hematocrit   31.3    Platelets   437      Data reviewed : Note from last visit              ASSESSMENT & PLAN     Diagnoses and all orders for this visit:    1. Hypothyroidism, unspecified type (Primary)  -     levothyroxine (SYNTHROID, LEVOTHROID) 125 MCG tablet; Take 1 tablet by mouth Every Morning.  Dispense: 90 tablet; Refill: 2  -     TSH Rfx On Abnormal To Free T4    She feels worse on lower dose of levothyroxine.  I am not sure if she was on levothyroxine dose to keep TSH suppressed due to history of thyroid cancer.  We will leave that 125 mcg for now until endocrinology visit.    2. Other hypoparathyroidism    Most recent calcium level normal.    3. Pure hypercholesterolemia  -     Comprehensive Metabolic Panel  -     Lipid Panel    4. High risk medication use  -     Comprehensive Metabolic Panel  -     CBC & Differential    5. Nail fungus  -     Tavaborole 5 % solution; Apply 1 application  topically Daily.  Dispense: 10 mL; Refill: 2    Discussed that this can be diagnosed with nail scraping through dermatology.  She is using a topical medication over-the-counter right now that is helping.  We will try prescription strength.  Discussed that this is usually a difficult issue to cure.      Health  Maintenance Due   Topic Date Due    TDAP/TD VACCINES (1 - Tdap) Never done    ZOSTER VACCINE (2 of 2) 11/26/2020    COVID-19 Vaccine (6 - Moderna series) 02/10/2023    ANNUAL WELLNESS VISIT  07/28/2023        Follow Up  Return in about 3 months (around 11/30/2023) for Medicare Wellness.    Patient/family had no further questions at this time and verbalized understanding of the plan discussed today.

## 2023-09-02 LAB
ALBUMIN SERPL-MCNC: 4.4 G/DL (ref 3.5–5.2)
ALBUMIN/GLOB SERPL: 2 G/DL
ALP SERPL-CCNC: 108 U/L (ref 39–117)
ALT SERPL-CCNC: 13 U/L (ref 1–33)
AST SERPL-CCNC: 21 U/L (ref 1–32)
BASOPHILS # BLD AUTO: 0.09 10*3/MM3 (ref 0–0.2)
BASOPHILS NFR BLD AUTO: 1.4 % (ref 0–1.5)
BILIRUB SERPL-MCNC: 0.2 MG/DL (ref 0–1.2)
BUN SERPL-MCNC: 19 MG/DL (ref 8–23)
BUN/CREAT SERPL: 18.6 (ref 7–25)
CALCIUM SERPL-MCNC: 8.3 MG/DL (ref 8.6–10.5)
CHLORIDE SERPL-SCNC: 103 MMOL/L (ref 98–107)
CHOLEST SERPL-MCNC: 241 MG/DL (ref 0–200)
CO2 SERPL-SCNC: 20 MMOL/L (ref 22–29)
CREAT SERPL-MCNC: 1.02 MG/DL (ref 0.57–1)
EGFRCR SERPLBLD CKD-EPI 2021: 57.5 ML/MIN/1.73
EOSINOPHIL # BLD AUTO: 0 10*3/MM3 (ref 0–0.4)
EOSINOPHIL NFR BLD AUTO: 0 % (ref 0.3–6.2)
ERYTHROCYTE [DISTWIDTH] IN BLOOD BY AUTOMATED COUNT: 15.1 % (ref 12.3–15.4)
GLOBULIN SER CALC-MCNC: 2.2 GM/DL
GLUCOSE SERPL-MCNC: 87 MG/DL (ref 65–99)
HCT VFR BLD AUTO: 37 % (ref 34–46.6)
HDLC SERPL-MCNC: 57 MG/DL (ref 40–60)
HGB BLD-MCNC: 11.7 G/DL (ref 12–15.9)
IMM GRANULOCYTES # BLD AUTO: 0.03 10*3/MM3 (ref 0–0.05)
IMM GRANULOCYTES NFR BLD AUTO: 0.5 % (ref 0–0.5)
LDLC SERPL CALC-MCNC: 163 MG/DL (ref 0–100)
LYMPHOCYTES # BLD AUTO: 2.28 10*3/MM3 (ref 0.7–3.1)
LYMPHOCYTES NFR BLD AUTO: 34.9 % (ref 19.6–45.3)
MCH RBC QN AUTO: 24.2 PG (ref 26.6–33)
MCHC RBC AUTO-ENTMCNC: 31.6 G/DL (ref 31.5–35.7)
MCV RBC AUTO: 76.6 FL (ref 79–97)
MONOCYTES # BLD AUTO: 0.59 10*3/MM3 (ref 0.1–0.9)
MONOCYTES NFR BLD AUTO: 9 % (ref 5–12)
NEUTROPHILS # BLD AUTO: 3.54 10*3/MM3 (ref 1.7–7)
NEUTROPHILS NFR BLD AUTO: 54.2 % (ref 42.7–76)
NRBC BLD AUTO-RTO: 0 /100 WBC (ref 0–0.2)
PLATELET # BLD AUTO: 343 10*3/MM3 (ref 140–450)
POTASSIUM SERPL-SCNC: 4.6 MMOL/L (ref 3.5–5.2)
PROT SERPL-MCNC: 6.6 G/DL (ref 6–8.5)
RBC # BLD AUTO: 4.83 10*6/MM3 (ref 3.77–5.28)
SODIUM SERPL-SCNC: 141 MMOL/L (ref 136–145)
T4 FREE SERPL-MCNC: 1.32 NG/DL (ref 0.93–1.7)
TRIGL SERPL-MCNC: 117 MG/DL (ref 0–150)
TSH SERPL DL<=0.005 MIU/L-ACNC: 6.06 UIU/ML (ref 0.27–4.2)
VLDLC SERPL CALC-MCNC: 21 MG/DL (ref 5–40)
WBC # BLD AUTO: 6.53 10*3/MM3 (ref 3.4–10.8)

## 2023-09-05 DIAGNOSIS — E20.8 OTHER HYPOPARATHYROIDISM: ICD-10-CM

## 2023-09-05 RX ORDER — CALCITRIOL 0.5 UG/1
0.5 CAPSULE, LIQUID FILLED ORAL DAILY
Qty: 90 CAPSULE | Refills: 0 | Status: SHIPPED | OUTPATIENT
Start: 2023-09-05

## 2023-09-19 ENCOUNTER — OFFICE VISIT (OUTPATIENT)
Dept: ENDOCRINOLOGY | Age: 73
End: 2023-09-19
Payer: MEDICARE

## 2023-09-19 VITALS
BODY MASS INDEX: 32.11 KG/M2 | HEART RATE: 93 BPM | TEMPERATURE: 97.3 F | OXYGEN SATURATION: 99 % | WEIGHT: 204.6 LBS | SYSTOLIC BLOOD PRESSURE: 130 MMHG | DIASTOLIC BLOOD PRESSURE: 78 MMHG | HEIGHT: 67 IN

## 2023-09-19 DIAGNOSIS — Z85.850 HISTORY OF THYROID CANCER: ICD-10-CM

## 2023-09-19 DIAGNOSIS — E89.0 POSTOPERATIVE HYPOTHYROIDISM: ICD-10-CM

## 2023-09-19 DIAGNOSIS — E89.0 POSTOPERATIVE HYPOTHYROIDISM: Primary | ICD-10-CM

## 2023-09-19 DIAGNOSIS — E20.9 HYPOPARATHYROIDISM, UNSPECIFIED HYPOPARATHYROIDISM TYPE: ICD-10-CM

## 2023-09-19 PROCEDURE — 99204 OFFICE O/P NEW MOD 45 MIN: CPT | Performed by: INTERNAL MEDICINE

## 2023-09-19 PROCEDURE — 1159F MED LIST DOCD IN RCRD: CPT | Performed by: INTERNAL MEDICINE

## 2023-09-19 PROCEDURE — 1160F RVW MEDS BY RX/DR IN RCRD: CPT | Performed by: INTERNAL MEDICINE

## 2023-09-19 NOTE — PROGRESS NOTES
Referring provider: Kaushik Watters MD     Reason for consult: Hypothyroidism and hypoparathyroidism    HPI:   - 73 year old female here for hypothyroidism and hypoparathyroidism  - She had a total thyroidectomy 20 years ago for thyroid cancer  - She does not know what type of thyroid cancer she had  - Denies missing any does of her levothyroxine 125 mcg (dose was changed 2 weeks ago), calcium carbonate 600 mg bid, calcitriol 0.5 mcg daily, magnesium  - She takes her levothyroxine at the same time every day, on an empty stomach, and not with hot beverages  - She denies muscle cramps when she takes her calcium but she sometimes forgets      The following portions of the patient's history were reviewed and updated as appropriate: allergies, current medications, past family history, past medical history, past social history, past surgical history, and problem list.      Objective     Vitals:    09/19/23 1257   BP: 130/78   Pulse: 93   Temp: 97.3 °F (36.3 °C)   SpO2: 99%        Physical Exam  Vitals reviewed.   Constitutional:       Appearance: Normal appearance.   HENT:      Head: Normocephalic and atraumatic.   Eyes:      General: No scleral icterus.  Pulmonary:      Effort: Pulmonary effort is normal. No respiratory distress.   Neurological:      Mental Status: She is alert.      Gait: Gait normal.   Psychiatric:         Mood and Affect: Mood normal.         Behavior: Behavior normal.         Thought Content: Thought content normal.         Judgment: Judgment normal.       Labs/Imaging:  TSH was 6.06, free T4 was 1.32, and calcium was 8.3 in 8/2023    Assessment & Plan   Hypothyroidism  2.   History of thyroid cancer  3.   Hypoparathyroidism  - Currently on levothyroxine 125 mcg (dose was changed 2 weeks ago), calcium carbonate 600 mg bid, calcitriol 0.5 mcg daily, magnesium (she does not know the dose)  - No symptoms of hypocalcemia  - I do not have any information on her thyroid cancer  - Will check TSH, free T4, Tg,  TgAb, RFP in 2 months    - Return to clinic in 6 months

## 2023-09-21 ENCOUNTER — PATIENT ROUNDING (BHMG ONLY) (OUTPATIENT)
Dept: ENDOCRINOLOGY | Age: 73
End: 2023-09-21
Payer: MEDICARE

## 2023-10-18 ENCOUNTER — TELEPHONE (OUTPATIENT)
Dept: GYNECOLOGIC ONCOLOGY | Facility: CLINIC | Age: 73
End: 2023-10-18
Payer: MEDICARE

## 2023-10-18 NOTE — TELEPHONE ENCOUNTER
I spoke to patient and informed her that Dr. Drummond is no longer with [facility].    Patient decline our offer to assist with the transfer of care.

## 2023-10-19 RX ORDER — PRAVASTATIN SODIUM 40 MG
40 TABLET ORAL NIGHTLY
Qty: 90 TABLET | Refills: 2 | Status: SHIPPED | OUTPATIENT
Start: 2023-10-19

## 2023-10-19 NOTE — TELEPHONE ENCOUNTER
Caller: Stefani Watson    Relationship: Self    Best call back number: [phone]     Requested Prescriptions:   Requested Prescriptions     Pending Prescriptions Disp Refills    pravastatin (PRAVACHOL) 40 MG tablet 90 tablet 3     Sig: Take 1 tablet by mouth Daily.        Pharmacy where request should be sent: [id number] [address] AT [facility] [phone] [facility] [phone]      Last office visit with prescribing clinician: 8/31/2023   Last telemedicine visit with prescribing clinician: Visit date not found   Next office visit with prescribing clinician: 12/5/2023       Does the patient have less than a 3 day supply:  [] Yes  [x] No    Would you like a call back once the refill request has been completed: [] Yes [x] No    If the office needs to give you a call back, can they leave a voicemail: [] Yes [x] No    Patito Koroma Rep   10/19/23 13:15 EDT

## 2023-11-06 ENCOUNTER — TRANSCRIBE ORDERS (OUTPATIENT)
Dept: ADMINISTRATIVE | Facility: HOSPITAL | Age: 73
End: 2023-11-06
Payer: MEDICARE

## 2023-11-06 ENCOUNTER — LAB (OUTPATIENT)
Dept: LAB | Facility: HOSPITAL | Age: 73
End: 2023-11-06
Payer: MEDICARE

## 2023-11-06 DIAGNOSIS — E89.0 POSTOPERATIVE HYPOTHYROIDISM: Primary | ICD-10-CM

## 2023-11-06 DIAGNOSIS — E89.0 POSTOPERATIVE HYPOTHYROIDISM: ICD-10-CM

## 2023-11-06 LAB
ALBUMIN SERPL-MCNC: 3.9 G/DL (ref 3.5–5.2)
ANION GAP SERPL CALCULATED.3IONS-SCNC: 12.4 MMOL/L (ref 5–15)
BUN SERPL-MCNC: 18 MG/DL (ref 8–23)
BUN/CREAT SERPL: 17.1 (ref 7–25)
CALCIUM SPEC-SCNC: 7.8 MG/DL (ref 8.6–10.5)
CHLORIDE SERPL-SCNC: 103 MMOL/L (ref 98–107)
CO2 SERPL-SCNC: 23.6 MMOL/L (ref 22–29)
CREAT SERPL-MCNC: 1.05 MG/DL (ref 0.57–1)
EGFRCR SERPLBLD CKD-EPI 2021: 55.5 ML/MIN/1.73
GLUCOSE SERPL-MCNC: 87 MG/DL (ref 65–99)
MAGNESIUM SERPL-MCNC: 2.2 MG/DL (ref 1.6–2.4)
PHOSPHATE SERPL-MCNC: 4.4 MG/DL (ref 2.5–4.5)
POTASSIUM SERPL-SCNC: 4.5 MMOL/L (ref 3.5–5.2)
SODIUM SERPL-SCNC: 139 MMOL/L (ref 136–145)
T4 FREE SERPL-MCNC: 1.36 NG/DL (ref 0.93–1.7)
TSH SERPL DL<=0.05 MIU/L-ACNC: 0.43 UIU/ML (ref 0.27–4.2)

## 2023-11-06 PROCEDURE — 83735 ASSAY OF MAGNESIUM: CPT

## 2023-11-06 PROCEDURE — 84443 ASSAY THYROID STIM HORMONE: CPT

## 2023-11-06 PROCEDURE — 80069 RENAL FUNCTION PANEL: CPT

## 2023-11-06 PROCEDURE — 84439 ASSAY OF FREE THYROXINE: CPT

## 2023-11-06 PROCEDURE — 84432 ASSAY OF THYROGLOBULIN: CPT

## 2023-11-06 PROCEDURE — 86800 THYROGLOBULIN ANTIBODY: CPT

## 2023-11-06 PROCEDURE — 36415 COLL VENOUS BLD VENIPUNCTURE: CPT

## 2023-11-07 LAB — THYROGLOB AB SERPL-ACNC: <1 IU/ML (ref 0–0.9)

## 2023-11-14 LAB — THYROGLOB SERPL-MCNC: <2 NG/ML

## 2023-12-05 ENCOUNTER — OFFICE VISIT (OUTPATIENT)
Dept: INTERNAL MEDICINE | Facility: CLINIC | Age: 73
End: 2023-12-05
Payer: MEDICARE

## 2023-12-05 VITALS
OXYGEN SATURATION: 98 % | HEIGHT: 67 IN | BODY MASS INDEX: 32.96 KG/M2 | WEIGHT: 210 LBS | DIASTOLIC BLOOD PRESSURE: 76 MMHG | SYSTOLIC BLOOD PRESSURE: 128 MMHG | HEART RATE: 78 BPM

## 2023-12-05 DIAGNOSIS — F32.A DEPRESSIVE DISORDER: ICD-10-CM

## 2023-12-05 DIAGNOSIS — Z00.00 MEDICARE ANNUAL WELLNESS VISIT, SUBSEQUENT: Primary | ICD-10-CM

## 2023-12-05 DIAGNOSIS — E20.89 OTHER HYPOPARATHYROIDISM: ICD-10-CM

## 2023-12-05 DIAGNOSIS — M19.90 ARTHRITIS: ICD-10-CM

## 2023-12-05 DIAGNOSIS — E78.00 PURE HYPERCHOLESTEROLEMIA: ICD-10-CM

## 2023-12-05 DIAGNOSIS — Z12.31 ENCOUNTER FOR SCREENING MAMMOGRAM FOR MALIGNANT NEOPLASM OF BREAST: ICD-10-CM

## 2023-12-05 DIAGNOSIS — D50.9 MICROCYTIC ANEMIA: ICD-10-CM

## 2023-12-05 RX ORDER — MELOXICAM 7.5 MG/1
7.5 TABLET ORAL DAILY
Qty: 30 TABLET | Refills: 2 | Status: SHIPPED | OUTPATIENT
Start: 2023-12-05

## 2023-12-05 RX ORDER — MELOXICAM 7.5 MG/1
7.5 TABLET ORAL DAILY
COMMUNITY
End: 2023-12-05 | Stop reason: SDUPTHER

## 2023-12-05 RX ORDER — CITALOPRAM 20 MG/1
20 TABLET ORAL DAILY
Qty: 90 TABLET | Refills: 1 | Status: SHIPPED | OUTPATIENT
Start: 2023-12-05 | End: 2023-12-06

## 2023-12-05 NOTE — PROGRESS NOTES
The ABCs of the Annual Wellness Visit  Subsequent Medicare Wellness Visit    Chief Complaint   Patient presents with    Medicare Wellness-subsequent      Subjective    History of Present Illness:  Stefani Watson is a 73 y.o. female who with hypothyroidism, depression, hyperlipidemia, hepatic steatosis presents for a Subsequent Medicare Wellness Visit.    The following portions of the patient's history were reviewed and   updated as appropriate: allergies, current medications, past family history, past medical history, past social history, past surgical history, and problem list.  Thyroid cancer s/p thyroidectomy. She has been on calcitriol and magnesium since removal of her parathyroid glands. She is not sure what kind of thyroid cancer she had 20 years ago. States she has been on synthroid dose to suppress TSH. Has had levothyroxine dose decreased in past due to heart racing.  She is now following with Dr. Olson with endocrinology.    Right hip arthritis pain. Thinks it is related to weather. Better with meloxicam. Has PT exercises from the past she can do.     Hearing issues on the right. Went to audiologist who referred her to ENT. Her ears were dry and she was prescribed ear drops. Now referred to specialist. Has an appintment in February.     Notes weight gain and allergies.     Compared to one year ago, the patient feels her physical   health is worse. Worse after surgery.     Compared to one year ago, the patient feels her mental   health is the same.    Recent Hospitalizations:  This patient has had a [facility] admission record on file within the last 365 days.    Current Medical Providers:  Patient Care Team:  Kaushik Watters MD as PCP - General (Internal Medicine)  Neville Olson DO as Consulting Physician (Endocrinology)  Paul Antonio MD as Consulting Physician (Urology)  Norma Barillas MD as Surgeon (General Surgery)  Cezar Antonio MD as Consulting Physician  (Otolaryngology)    Outpatient Medications Prior to Visit   Medication Sig Dispense Refill    calcitriol (ROCALTROL) 0.5 MCG capsule Take 1 capsule by mouth Daily. 90 capsule 0    calcium carbonate (OS-DIGNA) 600 MG tablet Take 2 tablets by mouth Daily.      dexAMETHasone (DECADRON) 0.1 % ophthalmic solution INSTILL 4 DROPS TO AFFECTED EAR FOUR TIMES DAILY      famotidine (PEPCID) 20 MG tablet Take 1 tablet by mouth At Night As Needed for Indigestion or Heartburn. 60 tablet 1    latanoprost (XALATAN) 0.005 % ophthalmic solution Administer 1 drop to both eyes Every Night.      levothyroxine (SYNTHROID, LEVOTHROID) 125 MCG tablet Take 1 tablet by mouth Every Morning. 90 tablet 2    Mirabegron ER (MYRBETRIQ) 25 MG tablet sustained-release 24 hour 24 hr tablet Take 1 tablet by mouth Daily.      pantoprazole (PROTONIX) 40 MG EC tablet TAKE 1 TABLET BY MOUTH DAILY 90 tablet 0    pravastatin (PRAVACHOL) 40 MG tablet Take 1 tablet by mouth Every Night. 90 tablet 2    Tavaborole 5 % solution Apply 1 application  topically Daily. 10 mL 2    citalopram (CeleXA) 20 MG tablet TAKE 1 TABLET BY MOUTH DAILY 90 tablet 1    meloxicam (MOBIC) 7.5 MG tablet Take 1 tablet by mouth Daily.       No facility-administered medications prior to visit.       No opioid medication identified on active medication list. I have reviewed chart for other potential  high risk medication/s and harmful drug interactions in the elderly.        Aspirin is not on active medication list.  Aspirin use is not indicated based on review of current medical condition/s. Risk of harm outweighs potential benefits.  .    Patient Active Problem List   Diagnosis    Hypothyroid    Depressive disorder    Diverticular disease of colon    Hearing loss    Hyperlipidemia    Osteoarthritis    Primary malignant neoplasm of thyroid gland    postmenopausal, asymptomatic Bilateral ovarian cysts    Arthritis    Encounter for screening colonoscopy    History of colon polyps     "Monoallelic mutation of MUTYH gene    Malignant neoplasm of ascending colon    Family history of ovarian cancer    Family history of breast cancer    Hepatic steatosis    Nausea and vomiting, unspecified vomiting type    Hydroureteronephrosis    SBO (small bowel obstruction)     Advance Care Planning  Advance Directive is not on file.  ACP discussion was held with the patient during this visit. Patient has an advance directive (not in EMR), copy requested.          Objective    Vitals:    12/05/23 1111   BP: 128/76   Pulse: 78   SpO2: 98%   Weight: 95.3 kg (210 lb)   Height: 170.2 cm (67.01\")     Estimated body mass index is 32.88 kg/m² as calculated from the following:    Height as of this encounter: 170.2 cm (67.01\").    Weight as of this encounter: 95.3 kg (210 lb).           Does the patient have evidence of cognitive impairment? No    Physical Exam  Constitutional:       Appearance: Normal appearance. She is normal weight.   HENT:      Right Ear: Tympanic membrane normal.      Left Ear: Tympanic membrane normal.      Ears:      Comments: Wears hearing aid bilaterally.  Cardiovascular:      Rate and Rhythm: Normal rate and regular rhythm.      Heart sounds: Normal heart sounds. No murmur heard.  Pulmonary:      Effort: Pulmonary effort is normal.      Breath sounds: Normal breath sounds.   Abdominal:      General: Abdomen is flat. There is no distension.      Palpations: Abdomen is soft.      Tenderness: There is no abdominal tenderness.   Musculoskeletal:      Right lower leg: No edema.      Left lower leg: No edema.   Skin:     General: Skin is warm and dry.   Neurological:      Mental Status: She is alert.   Psychiatric:         Mood and Affect: Mood normal.         Behavior: Behavior normal.         Thought Content: Thought content normal.                 HEALTH RISK ASSESSMENT    Smoking Status:  Social History     Tobacco Use   Smoking Status Never   Smokeless Tobacco Never     Alcohol Consumption:  Social " History     Substance and Sexual Activity   Alcohol Use Yes    Comment: Occasionally     Fall Risk Screen:    PHILLIP Fall Risk Assessment was completed, and patient is at LOW risk for falls.Assessment completed on:2023    Depression Screenin/5/2023    11:12 AM   PHQ-2/PHQ-9 Depression Screening   Little Interest or Pleasure in Doing Things 0-->not at all   Feeling Down, Depressed or Hopeless 0-->not at all   PHQ-9: Brief Depression Severity Measure Score 0       Health Habits and Functional and Cognitive Screenin/5/2023    11:12 AM   Functional & Cognitive Status   Do you have difficulty preparing food and eating? No   Do you have difficulty bathing yourself, getting dressed or grooming yourself? No   Do you have difficulty using the toilet? No   Do you have difficulty moving around from place to place? No   Do you have trouble with steps or getting out of a bed or a chair? No   Do you need help using the phone?  No   Are you deaf or do you have serious difficulty hearing?  No   Do you need help to go to places out of walking distance? No   Do you need help shopping? No   Do you need help preparing meals?  No   Do you need help with housework?  No   Do you need help with laundry? No   Do you need help taking your medications? No   Do you need help managing money? No   Do you ever drive or ride in a car without wearing a seat belt? No       Age-appropriate Screening Schedule:  Refer to the list below for future screening recommendations based on patient's age, sex and/or medical conditions. Orders for these recommended tests are listed in the plan section. The patient has been provided with a written plan.    Health Maintenance   Topic Date Due    TDAP/TD VACCINES (1 - Tdap) Never done    ANNUAL WELLNESS VISIT  2023    BMI FOLLOWUP  2024    LIPID PANEL  2024    DXA SCAN  2024    HEPATITIS C SCREENING  Completed    COVID-19 Vaccine  Completed    INFLUENZA VACCINE   Completed    Pneumococcal Vaccine 65+  Completed    ZOSTER VACCINE  Discontinued    COLORECTAL CANCER SCREENING  Discontinued              Assessment & Plan   CMS Preventative Services Quick Reference  Risk Factors Identified During Encounter  Hearing Problem:  She is already following with ENT  The above risks/problems have been discussed with the patient.  Follow up actions/plans if indicated are seen below in the Assessment/Plan Section.  Pertinent information has been shared with the patient in the After Visit Summary.    Diagnoses and all orders for this visit:    1. Medicare annual wellness visit, subsequent (Primary)    2. Pure hypercholesterolemia  -     Lipid Panel With LDL / HDL Ratio    3. Microcytic anemia  -     CBC & Differential  -     Iron and TIBC  -     Ferritin    4. Encounter for screening mammogram for malignant neoplasm of breast  -     Mammo screening digital tomosynthesis bilateral w CAD; Future    5. Arthritis  -     meloxicam (MOBIC) 7.5 MG tablet; Take 1 tablet by mouth Daily.  Dispense: 30 tablet; Refill: 2    6. Depressive disorder  -     citalopram (CeleXA) 20 MG tablet; Take 1 tablet by mouth Daily.  Dispense: 90 tablet; Refill: 1    Hip osteoarthritis controlled on Mobic.    Checking iron studies for microcytic anemia.    Depression under good control on Celexa.    Follow Up:   Return in about 6 months (around 6/5/2024) for Recheck.     An After Visit Summary and PPPS were made available to the patient.

## 2023-12-06 LAB
BASOPHILS # BLD AUTO: 0.07 10*3/MM3 (ref 0–0.2)
BASOPHILS NFR BLD AUTO: 0.9 % (ref 0–1.5)
CHOLEST SERPL-MCNC: 232 MG/DL (ref 0–200)
EOSINOPHIL # BLD AUTO: 0 10*3/MM3 (ref 0–0.4)
EOSINOPHIL NFR BLD AUTO: 0 % (ref 0.3–6.2)
ERYTHROCYTE [DISTWIDTH] IN BLOOD BY AUTOMATED COUNT: 15 % (ref 12.3–15.4)
FERRITIN SERPL-MCNC: 23.6 NG/ML (ref 13–150)
HCT VFR BLD AUTO: 41.5 % (ref 34–46.6)
HDLC SERPL-MCNC: 48 MG/DL (ref 40–60)
HGB BLD-MCNC: 13 G/DL (ref 12–15.9)
IMM GRANULOCYTES # BLD AUTO: 0.03 10*3/MM3 (ref 0–0.05)
IMM GRANULOCYTES NFR BLD AUTO: 0.4 % (ref 0–0.5)
IRON SATN MFR SERPL: 12 % (ref 20–50)
IRON SERPL-MCNC: 60 MCG/DL (ref 37–145)
LDLC SERPL CALC-MCNC: 161 MG/DL (ref 0–100)
LDLC/HDLC SERPL: 3.31 {RATIO}
LYMPHOCYTES # BLD AUTO: 1.89 10*3/MM3 (ref 0.7–3.1)
LYMPHOCYTES NFR BLD AUTO: 23.9 % (ref 19.6–45.3)
MCH RBC QN AUTO: 25.3 PG (ref 26.6–33)
MCHC RBC AUTO-ENTMCNC: 31.3 G/DL (ref 31.5–35.7)
MCV RBC AUTO: 80.7 FL (ref 79–97)
MONOCYTES # BLD AUTO: 0.64 10*3/MM3 (ref 0.1–0.9)
MONOCYTES NFR BLD AUTO: 8.1 % (ref 5–12)
NEUTROPHILS # BLD AUTO: 5.29 10*3/MM3 (ref 1.7–7)
NEUTROPHILS NFR BLD AUTO: 66.7 % (ref 42.7–76)
NRBC BLD AUTO-RTO: 0 /100 WBC (ref 0–0.2)
PLATELET # BLD AUTO: 328 10*3/MM3 (ref 140–450)
RBC # BLD AUTO: 5.14 10*6/MM3 (ref 3.77–5.28)
TIBC SERPL-MCNC: 493 MCG/DL
TRIGL SERPL-MCNC: 126 MG/DL (ref 0–150)
UIBC SERPL-MCNC: 433 MCG/DL (ref 112–346)
VLDLC SERPL CALC-MCNC: 23 MG/DL (ref 5–40)
WBC # BLD AUTO: 7.92 10*3/MM3 (ref 3.4–10.8)

## 2023-12-06 RX ORDER — PRAVASTATIN SODIUM 80 MG/1
80 TABLET ORAL NIGHTLY
Qty: 90 TABLET | Refills: 2 | Status: SHIPPED | OUTPATIENT
Start: 2023-12-06

## 2023-12-06 RX ORDER — CITALOPRAM 20 MG/1
20 TABLET ORAL DAILY
Qty: 90 TABLET | Refills: 1 | Status: SHIPPED | OUTPATIENT
Start: 2023-12-06

## 2023-12-06 RX ORDER — CALCITRIOL 0.25 UG/1
0.25 CAPSULE, LIQUID FILLED ORAL DAILY
Qty: 90 CAPSULE | Refills: 1 | OUTPATIENT
Start: 2023-12-06

## 2023-12-14 DIAGNOSIS — E20.89 OTHER HYPOPARATHYROIDISM: ICD-10-CM

## 2023-12-14 RX ORDER — CALCITRIOL 0.5 UG/1
0.5 CAPSULE, LIQUID FILLED ORAL DAILY
Qty: 90 CAPSULE | Refills: 0 | Status: SHIPPED | OUTPATIENT
Start: 2023-12-14

## 2024-01-05 ENCOUNTER — HOSPITAL ENCOUNTER (OUTPATIENT)
Dept: MAMMOGRAPHY | Facility: HOSPITAL | Age: 74
Discharge: HOME | End: 2024-01-05
Admitting: STUDENT IN AN ORGANIZED HEALTH CARE EDUCATION/TRAINING PROGRAM
Payer: MEDICARE

## 2024-01-05 DIAGNOSIS — Z12.31 ENCOUNTER FOR SCREENING MAMMOGRAM FOR MALIGNANT NEOPLASM OF BREAST: ICD-10-CM

## 2024-01-05 PROCEDURE — 77063 BREAST TOMOSYNTHESIS BI: CPT

## 2024-01-05 PROCEDURE — 77067 SCR MAMMO BI INCL CAD: CPT

## 2024-01-09 ENCOUNTER — TELEPHONE (OUTPATIENT)
Dept: INTERNAL MEDICINE | Facility: CLINIC | Age: 74
End: 2024-01-09

## 2024-01-09 NOTE — TELEPHONE ENCOUNTER
Caller: Stefani Watson    Relationship: Self    Best call back number: [phone]    What is the best time to reach you: ANY    Who are you requesting to speak with (clinical staff, provider,  specific staff member):         What was the call regarding: PATIENT CALLED IN AND STATED THAT THEY FOUND A NODULE ON HER LUNG LAST YEAR.   [facility] IS REQUESTING THAT SHE FOLLOW UP ON THIS. PATIENT WANTED TO LET US KNOW THAT THEY FAXED OVER THE PAPERWORK REGARDING THIS.    HAS THIS PAPERWORK BEEN RECEIVED?

## 2024-01-09 NOTE — TELEPHONE ENCOUNTER
No paperwork has been received yet.     OKAY FOR HUB TO SHARE INFORMATION ABOVE AND   ANY OTHER PREVIOUS MESSAGES IN THIS NOTE/CALL.

## 2024-02-07 DIAGNOSIS — R91.1 LUNG NODULE: Primary | ICD-10-CM

## 2024-02-22 ENCOUNTER — HOSPITAL ENCOUNTER (OUTPATIENT)
Dept: CT IMAGING | Facility: HOSPITAL | Age: 74
Discharge: HOME | End: 2024-02-22
Admitting: STUDENT IN AN ORGANIZED HEALTH CARE EDUCATION/TRAINING PROGRAM
Payer: MEDICARE

## 2024-02-22 DIAGNOSIS — R91.1 LUNG NODULE: ICD-10-CM

## 2024-02-22 PROCEDURE — 71250 CT THORAX DX C-: CPT

## 2024-03-27 ENCOUNTER — OFFICE VISIT (OUTPATIENT)
Dept: ENDOCRINOLOGY | Age: 74
End: 2024-03-27
Payer: MEDICARE

## 2024-03-27 VITALS
BODY MASS INDEX: 34 KG/M2 | WEIGHT: 216.6 LBS | SYSTOLIC BLOOD PRESSURE: 124 MMHG | OXYGEN SATURATION: 98 % | HEIGHT: 67 IN | DIASTOLIC BLOOD PRESSURE: 82 MMHG | HEART RATE: 82 BPM

## 2024-03-27 DIAGNOSIS — E20.9 HYPOPARATHYROIDISM, UNSPECIFIED HYPOPARATHYROIDISM TYPE: ICD-10-CM

## 2024-03-27 DIAGNOSIS — Z85.850 HISTORY OF THYROID CANCER: ICD-10-CM

## 2024-03-27 DIAGNOSIS — E89.0 POSTOPERATIVE HYPOTHYROIDISM: Primary | ICD-10-CM

## 2024-03-27 PROCEDURE — 1160F RVW MEDS BY RX/DR IN RCRD: CPT | Performed by: INTERNAL MEDICINE

## 2024-03-27 PROCEDURE — 99214 OFFICE O/P EST MOD 30 MIN: CPT | Performed by: INTERNAL MEDICINE

## 2024-03-27 PROCEDURE — 1159F MED LIST DOCD IN RCRD: CPT | Performed by: INTERNAL MEDICINE

## 2024-03-27 PROCEDURE — G2211 COMPLEX E/M VISIT ADD ON: HCPCS | Performed by: INTERNAL MEDICINE

## 2024-03-27 NOTE — PROGRESS NOTES
Chief complaint/Reason for consult: hypothyroidism and hypoparathyroidism    HPI:   - 73 year old female here for hypothyroidism and hypoparathyroidism  - Was last seen in 9/2023  - No changes since last visit  - She had a total thyroidectomy 20 years ago for thyroid cancer  - She does not know what type of thyroid cancer she had  - Denies missing any does of her levothyroxine 125 mcg, calcium carbonate 600 mg bid, calcitriol 0.5 mcg daily, magnesium  - She takes her levothyroxine at the same time every day, on an empty stomach, and not with hot beverages  - She denies muscle cramps when she takes her calcium but she sometimes forgets    The following portions of the patient's history were reviewed and updated as appropriate: allergies, current medications, past family history, past medical history, past social history, past surgical history, and problem list.    Objective     Vitals:    03/27/24 1447   BP: 124/82   Pulse: 82   SpO2: 98%        Physical Exam  Vitals reviewed.   HENT:      Head: Normocephalic and atraumatic.   Eyes:      General: No scleral icterus.  Pulmonary:      Effort: Pulmonary effort is normal. No respiratory distress.   Neurological:      Mental Status: She is alert.      Gait: Gait normal.   Psychiatric:         Mood and Affect: Mood normal.         Behavior: Behavior normal.         Thought Content: Thought content normal.         Judgment: Judgment normal.         Labs/Imaging:     Assessment & Plan   Hypothyroidism  2.   History of thyroid cancer  3.   Hypoparathyroidism  - Currently on levothyroxine 125 mcg, calcium carbonate 600 mg bid, calcitriol 0.5 mcg daily, magnesium (she does not know the dose)  - No symptoms of hypocalcemia  - I do not have any information on her thyroid cancer  - Will check TSH, free T4, Tg, RFP     - Return to clinic in 6 months

## 2024-04-04 DIAGNOSIS — E03.9 HYPOTHYROIDISM, UNSPECIFIED TYPE: ICD-10-CM

## 2024-04-04 LAB
ALBUMIN SERPL-MCNC: 3.9 G/DL (ref 3.8–4.8)
BUN SERPL-MCNC: 18 MG/DL (ref 8–27)
BUN/CREAT SERPL: 13 (ref 12–28)
CALCIUM SERPL-MCNC: 8 MG/DL (ref 8.7–10.3)
CHLORIDE SERPL-SCNC: 106 MMOL/L (ref 96–106)
CO2 SERPL-SCNC: 23 MMOL/L (ref 20–29)
CREAT SERPL-MCNC: 1.4 MG/DL (ref 0.57–1)
EGFRCR SERPLBLD CKD-EPI 2021: 39 ML/MIN/1.73
GLUCOSE SERPL-MCNC: 86 MG/DL (ref 70–99)
PHOSPHATE SERPL-MCNC: 4.4 MG/DL (ref 3–4.3)
POTASSIUM SERPL-SCNC: 4.3 MMOL/L (ref 3.5–5.2)
REPORT: NORMAL
SODIUM SERPL-SCNC: 144 MMOL/L (ref 134–144)
T4 FREE SERPL-MCNC: 1.6 NG/DL (ref 0.82–1.77)
THYROGLOB SERPL-MCNC: <2 NG/ML
TSH SERPL DL<=0.005 MIU/L-ACNC: 0.15 UIU/ML (ref 0.45–4.5)

## 2024-04-04 RX ORDER — LEVOTHYROXINE SODIUM 112 UG/1
112 TABLET ORAL
Qty: 90 TABLET | Refills: 1 | Status: SHIPPED | OUTPATIENT
Start: 2024-04-04

## 2024-04-05 ENCOUNTER — TELEPHONE (OUTPATIENT)
Dept: ENDOCRINOLOGY | Age: 74
End: 2024-04-05
Payer: MEDICARE

## 2024-04-05 NOTE — TELEPHONE ENCOUNTER
----- Message from Neville Olson DO sent at 4/4/2024 12:22 PM EDT -----  Can she please have repeat lab work in 2 months.  Thank you

## 2024-04-05 NOTE — TELEPHONE ENCOUNTER
PATIENT IS GOING TO THE [facility] FOR LABS.  READ HER DR. DURON' MESSAGE

## 2024-06-12 ENCOUNTER — OFFICE VISIT (OUTPATIENT)
Dept: INTERNAL MEDICINE | Facility: CLINIC | Age: 74
End: 2024-06-12
Payer: MEDICARE

## 2024-06-12 VITALS
DIASTOLIC BLOOD PRESSURE: 72 MMHG | WEIGHT: 211.6 LBS | HEIGHT: 67 IN | OXYGEN SATURATION: 97 % | SYSTOLIC BLOOD PRESSURE: 112 MMHG | BODY MASS INDEX: 33.21 KG/M2 | HEART RATE: 58 BPM

## 2024-06-12 DIAGNOSIS — R73.09 ELEVATED RANDOM BLOOD GLUCOSE LEVEL: ICD-10-CM

## 2024-06-12 DIAGNOSIS — F32.A DEPRESSIVE DISORDER: ICD-10-CM

## 2024-06-12 DIAGNOSIS — R43.8 METALLIC TASTE: ICD-10-CM

## 2024-06-12 DIAGNOSIS — E89.0 POSTOPERATIVE HYPOTHYROIDISM: ICD-10-CM

## 2024-06-12 DIAGNOSIS — E78.00 PURE HYPERCHOLESTEROLEMIA: Primary | ICD-10-CM

## 2024-06-12 DIAGNOSIS — K76.0 HEPATIC STEATOSIS: ICD-10-CM

## 2024-06-12 DIAGNOSIS — E66.9 CLASS 1 OBESITY WITH SERIOUS COMORBIDITY AND BODY MASS INDEX (BMI) OF 33.0 TO 33.9 IN ADULT, UNSPECIFIED OBESITY TYPE: ICD-10-CM

## 2024-06-12 PROBLEM — E66.811 CLASS 1 OBESITY WITH SERIOUS COMORBIDITY AND BODY MASS INDEX (BMI) OF 33.0 TO 33.9 IN ADULT: Status: ACTIVE | Noted: 2024-06-12

## 2024-06-12 LAB
ALBUMIN SERPL-MCNC: 4.2 G/DL (ref 3.5–5.2)
ALBUMIN/GLOB SERPL: 1.7 G/DL
ALP SERPL-CCNC: 106 U/L (ref 39–117)
ALT SERPL-CCNC: 15 U/L (ref 1–33)
AST SERPL-CCNC: 15 U/L (ref 1–32)
BILIRUB SERPL-MCNC: 0.4 MG/DL (ref 0–1.2)
BUN SERPL-MCNC: 14 MG/DL (ref 8–23)
BUN/CREAT SERPL: 13.9 (ref 7–25)
CALCIUM SERPL-MCNC: 8.7 MG/DL (ref 8.6–10.5)
CHLORIDE SERPL-SCNC: 102 MMOL/L (ref 98–107)
CHOLEST SERPL-MCNC: 202 MG/DL (ref 0–200)
CO2 SERPL-SCNC: 26.4 MMOL/L (ref 22–29)
CREAT SERPL-MCNC: 1.01 MG/DL (ref 0.57–1)
EGFRCR SERPLBLD CKD-EPI 2021: 58.2 ML/MIN/1.73
GLOBULIN SER CALC-MCNC: 2.5 GM/DL
GLUCOSE SERPL-MCNC: 90 MG/DL (ref 65–99)
HBA1C MFR BLD: 5.8 % (ref 4.8–5.6)
HDLC SERPL-MCNC: 47 MG/DL (ref 40–60)
LDLC SERPL CALC-MCNC: 136 MG/DL (ref 0–100)
LDLC/HDLC SERPL: 2.84 {RATIO}
POTASSIUM SERPL-SCNC: 4.4 MMOL/L (ref 3.5–5.2)
PROT SERPL-MCNC: 6.7 G/DL (ref 6–8.5)
SODIUM SERPL-SCNC: 139 MMOL/L (ref 136–145)
TRIGL SERPL-MCNC: 107 MG/DL (ref 0–150)
VLDLC SERPL CALC-MCNC: 19 MG/DL (ref 5–40)

## 2024-06-12 PROCEDURE — 99214 OFFICE O/P EST MOD 30 MIN: CPT | Performed by: STUDENT IN AN ORGANIZED HEALTH CARE EDUCATION/TRAINING PROGRAM

## 2024-06-12 PROCEDURE — 1126F AMNT PAIN NOTED NONE PRSNT: CPT | Performed by: STUDENT IN AN ORGANIZED HEALTH CARE EDUCATION/TRAINING PROGRAM

## 2024-06-12 NOTE — PROGRESS NOTES
Answers submitted by the patient for this visit:  Other (Submitted on 6/9/2024)  Please describe your symptoms.: Strong metalic taste in mouth  Have you had these symptoms before?: No  How long have you been having these symptoms?: 1-2 weeks  Primary Reason for Visit (Submitted on 6/9/2024)  What is the primary reason for your visit?: Other    Kaushik Watters M.D.  Internal Medicine  [address], [address]  [place] [address]  [phone]      Chief Complaint  Hypothyroidism (6 mth F/U /)    SUBJECTIVE    History of Present Illness    Stefani Watson is a 73 y.o. female with hypothyroidism, depression, hyperlipidemia, hepatic steatosis who presents to the office today as an established patient that last saw me on 12/5/2023.     She follows with endocrinology for hypothyroidism and history of thyroid cancer.    Depression-on Celexa    Hip osteoarthritis-on Mobic    Iron deficiency anemia has resolved however iron levels have remained low.  She had a colonoscopy last year.    She had follow-up lung CT in February. Sub-6 mm micronodules within the right lung. A follow-up  chest CT in 1 year to evaluate for stability    At last appointment we increased her pravastatin.    She had ear surgery 2 months ago. Using ear drops. Packing in right ear. She has metallic taste in mouth. Some GERD. Has not been to dentist in a long time. Recent weight gain. Pepcid helps GERD.    She is walking to lose weight. Not following any diet. Snacks. Eats when she is bored.     Review of Systems    Allergies   Allergen Reactions    Blue Dyes (Parenteral) Rash    Other Itching and Rash     CHG wipes    Simvastatin Myalgia        Outpatient Medications Marked as Taking for the 6/12/24 encounter (Office Visit) with Kaushik Watters MD   Medication Sig Dispense Refill    calcitriol (ROCALTROL) 0.5 MCG capsule TAKE 1 CAPSULE BY MOUTH DAILY 90 capsule 0    calcium carbonate (OS-DIGNA) 600 MG tablet Take 2 tablets by  mouth Daily.      citalopram (CeleXA) 20 MG tablet TAKE 1 TABLET BY MOUTH DAILY 90 tablet 1    dexAMETHasone (DECADRON) 0.1 % ophthalmic solution INSTILL 4 DROPS TO AFFECTED EAR FOUR TIMES DAILY      famotidine (PEPCID) 20 MG tablet Take 1 tablet by mouth At Night As Needed for Indigestion or Heartburn. 60 tablet 1    latanoprost (XALATAN) 0.005 % ophthalmic solution Administer 1 drop to both eyes Every Night.      levothyroxine (SYNTHROID, LEVOTHROID) 112 MCG tablet Take 1 tablet by mouth Every Morning. 90 tablet 1    Mirabegron ER (MYRBETRIQ) 25 MG tablet sustained-release 24 hour 24 hr tablet Take 1 tablet by mouth Daily.      pravastatin (PRAVACHOL) 80 MG tablet Take 1 tablet by mouth Every Night. 90 tablet 2    Tavaborole 5 % solution Apply 1 application  topically Daily. 10 mL 2    [DISCONTINUED] meloxicam (MOBIC) 7.5 MG tablet Take 1 tablet by mouth Daily. 30 tablet 2        Past Medical History:   Diagnosis Date    Colon polyp 1/09/2023    Colonic mass     Diverticulitis     Diverticulosis 1/09/2023    [place] (hard of hearing)     SIGNIFICANT-BILAT HEARING AIDS    Hydronephrosis     RIGHT    Hyperlipidemia     Hyperlipidemia 09/08/2016    Hypothyroidism 9/11 /2001    Thyroid removed    OA (osteoarthritis) of hip     PONV (postoperative nausea and vomiting)     Stress incontinence     Thyroid cancer     RADIOACTIVE IODINE     Past Surgical History:   Procedure Laterality Date    CATARACT EXTRACTION, BILATERAL      COLON RESECTION N/A 2/9/2023    Procedure: laparoscopic right hemicolectomyconverted to open  bilateral ooperectomy;  Surgeon: Norma Barillas MD;  Location: [facility] OR;  Service: General;  Laterality: N/A;    COLON RESECTION N/A 2/22/2023    Procedure: DIAGNOSTIC LAPAROSCOPY WITH OPEN ILLEO-COLOIC RESECTION;  Surgeon: Norma Barillas MD;  Location: [facility] OR;  Service: General;  Laterality: N/A;    COLONOSCOPY N/A 01/09/2023    Procedure: COLONOSCOPY to cecum with biopsy of ileo cecal valve  "polyp;  Surgeon: Norma Barillas MD;  Location: [facility] ENDOSCOPY;  Service: General;  Laterality: N/A;  PRE - screening  POST - diverticulosis, ileo cecal valve polyp    CYSTOSCOPY W/ URETERAL STENT PLACEMENT Right 2/14/2023    Procedure: CYSTOSCOPY URETERAL CATHETER/STENT INSERTION, Retrograde Pyelogram;  Surgeon: Paul Antonio MD;  Location: [facility] MAIN OR;  Service: Urology;  Laterality: Right;    CYSTOSCOPY W/ URETERAL STENT PLACEMENT Right 3/29/2023    Procedure: CYSTOSCOPY , RIGHT STENT exchange, right ureteroscopy, RETROGRADE PYELOGRAM;  Surgeon: Paul Antonio MD;  Location: [facility] OR;  Service: Urology;  Laterality: Right;    EYE SURGERY      Cataracts    LAPAROSCOPIC TUBAL LIGATION      ROTATOR CUFF REPAIR Left     STAPEDECTOMY Right     THYROIDECTOMY Bilateral     TUBAL ABDOMINAL LIGATION      URETEROSCOPY LASER LITHOTRIPSY WITH STENT INSERTION Left 5/19/2023    Procedure: CYSTOSCOPY, BIlateral retrogradesRETROGRADE PYELOGRAM, RIGHT stent removal;  Surgeon: Paul Antonio MD;  Location: [facility] OR;  Service: Urology;  Laterality: Left;     Family History   Problem Relation Age of Onset    Heart disease Mother         Late 70s    Cancer Sister     Ovarian cancer Sister     Cancer Maternal Grandmother     Breast cancer Maternal Grandmother     Heart disease Maternal Grandfather     Malig Hyperthermia Neg Hx     reports that she has never smoked. She has never used smokeless tobacco. She reports current alcohol use. She reports that she does not use drugs.    OBJECTIVE    Vital Signs:   /72   Pulse 58   Ht 170.2 cm (67.01\")   Wt 96 kg (211 lb 9.6 oz)   SpO2 97%   BMI 33.13 kg/m²     Physical Exam  Constitutional:       Appearance: Normal appearance.   HENT:      Mouth/Throat:      Mouth: Mucous membranes are moist.      Comments: Poor dentition  Cardiovascular:      Rate and Rhythm: Normal rate and regular rhythm.      Heart sounds: Normal heart sounds. No murmur " heard.  Pulmonary:      Effort: Pulmonary effort is normal.      Breath sounds: Normal breath sounds.   Skin:     General: Skin is warm and dry.   Neurological:      Mental Status: She is alert.   Psychiatric:         Mood and Affect: Mood normal.         Behavior: Behavior normal.         Thought Content: Thought content normal.            The following data was reviewed by: Kaushik Watters MD on 06/12/2024:  CMP          8/31/2023    10:31 11/6/2023    11:39 3/27/2024    15:06   CMP   Glucose 87  87  86    BUN 19  18  18    Creatinine 1.02  1.05  1.40    EGFR  55.5     Sodium 141  139  144    Potassium 4.6  4.5  4.3    Chloride 103  103  106    Calcium 8.3  7.8  8.0    Total Protein 6.6      Albumin 4.4  3.9  3.9    Globulin 2.2      Total Bilirubin 0.2      Alkaline Phosphatase 108      AST (SGOT) 21      ALT (SGPT) 13      BUN/Creatinine Ratio 18.6  17.1  13    Anion Gap  12.4       CBC w/diff          8/31/2023    10:31 12/5/2023    12:01   CBC w/Diff   WBC 6.53  7.92    RBC 4.83  5.14    Hemoglobin 11.7  13.0    Hematocrit 37.0  41.5    MCV 76.6  80.7    MCH 24.2  25.3    MCHC 31.6  31.3    RDW 15.1  15.0    Platelets 343  328    Neutrophil Rel % 54.2  66.7    Lymphocyte Rel % 34.9  23.9    Monocyte Rel % 9.0  8.1    Eosinophil Rel % 0.0  0.0    Basophil Rel % 1.4  0.9      Lipid Panel          8/31/2023    10:31 12/5/2023    12:01   Lipid Panel   Total Cholesterol 241  232    Triglycerides 117  126    HDL Cholesterol 57  48    VLDL Cholesterol 21  23    LDL Cholesterol  163  161    LDL/HDL Ratio  3.31      TSH          8/31/2023    10:31 11/6/2023    11:39 3/27/2024    15:06   TSH   TSH 6.060  0.429  0.150                    ASSESSMENT & PLAN     Diagnoses and all orders for this visit:    1. Pure hypercholesterolemia (Primary)  Assessment & Plan:    Continue taking the following medication/s;  pravastatin.      Counseled patient on lifestyle modifications to help control hyperlipidemia.   Weight Loss  encouraged    Patient Treatment Goals:     LDL goal is under 100    Followup in 6 months.    Orders:  -     Lipid Panel With LDL / HDL Ratio    2. Hepatic steatosis  Assessment & Plan:  CMP today. Weight loss encouraged    Orders:  -     Comprehensive Metabolic Panel    3. Depressive disorder  Assessment & Plan:  Patient's depression is a recurrent episode that is mild without psychosis. Depression is active and stable.    Plan:   Continue current medication therapy     Followup in 6 months.       4. Postoperative hypothyroidism    5. Elevated random blood glucose level  -     Hemoglobin A1c  -     Comprehensive Metabolic Panel    6. Metallic taste  -     Comprehensive Metabolic Panel    7. Class 1 obesity with serious comorbidity and body mass index (BMI) of 33.0 to 33.9 in adult, unspecified obesity type  Assessment & Plan:  BMI is >= 30 and <35. (Class 1 Obesity). The following options were offered after discussion;: weight loss educational material (shared in after visit summary), exercise counseling/recommendations, and nutrition counseling/recommendations          Suspect metallic taste due to poor dentition or ear drops. Checking for systemic causes today.. Patient encouraged to make dental appointment.      Health Maintenance Due   Topic Date Due    TDAP/TD VACCINES (1 - Tdap) Never done    COVID-19 Vaccine (7 - 2023-24 season) 02/07/2024        Follow Up  Return in about 6 months (around 12/12/2024) for Medicare Wellness.    Patient/family had no further questions at this time and verbalized understanding of the plan discussed today.

## 2024-06-12 NOTE — ASSESSMENT & PLAN NOTE
BMI is >= 30 and <35. (Class 1 Obesity). The following options were offered after discussion;: weight loss educational material (shared in after visit summary), exercise counseling/recommendations, and nutrition counseling/recommendations

## 2024-06-12 NOTE — ASSESSMENT & PLAN NOTE
Continue taking the following medication/s;  pravastatin.      Counseled patient on lifestyle modifications to help control hyperlipidemia.   Weight Loss encouraged    Patient Treatment Goals:     LDL goal is under 100    Followup in 6 months.

## 2024-07-02 ENCOUNTER — LAB (OUTPATIENT)
Dept: LAB | Facility: HOSPITAL | Age: 74
End: 2024-07-02
Payer: MEDICARE

## 2024-07-02 PROCEDURE — 84439 ASSAY OF FREE THYROXINE: CPT | Performed by: INTERNAL MEDICINE

## 2024-07-02 PROCEDURE — 84443 ASSAY THYROID STIM HORMONE: CPT | Performed by: INTERNAL MEDICINE

## 2024-07-31 ENCOUNTER — OFFICE VISIT (OUTPATIENT)
Dept: INTERNAL MEDICINE | Facility: CLINIC | Age: 74
End: 2024-07-31
Payer: MEDICARE

## 2024-07-31 VITALS
OXYGEN SATURATION: 98 % | BODY MASS INDEX: 32.74 KG/M2 | HEART RATE: 101 BPM | DIASTOLIC BLOOD PRESSURE: 85 MMHG | HEIGHT: 67 IN | SYSTOLIC BLOOD PRESSURE: 122 MMHG | WEIGHT: 208.6 LBS

## 2024-07-31 DIAGNOSIS — L30.4 INTERTRIGO: Primary | ICD-10-CM

## 2024-07-31 PROCEDURE — 99213 OFFICE O/P EST LOW 20 MIN: CPT | Performed by: STUDENT IN AN ORGANIZED HEALTH CARE EDUCATION/TRAINING PROGRAM

## 2024-07-31 PROCEDURE — 1126F AMNT PAIN NOTED NONE PRSNT: CPT | Performed by: STUDENT IN AN ORGANIZED HEALTH CARE EDUCATION/TRAINING PROGRAM

## 2024-07-31 RX ORDER — HYDROCORTISONE 2.5% / KETOCONAZOLE 2% 2.5; 2 G/100G; G/100G
1 CREAM TOPICAL DAILY
Qty: 30 G | Refills: 0 | Status: SHIPPED | OUTPATIENT
Start: 2024-07-31 | End: 2024-08-14

## 2024-07-31 NOTE — PROGRESS NOTES
Kaushik Watters M.D.  Internal Medicine  [facility]  [address], [address]  [place] [address]  [phone]      Chief Complaint  Rash (Rash under breast and in arm pits. Rash itches and burns. X 3 weeks. )    SUBJECTIVE    History of Present Illness    Stefani Watson is a 73 y.o. female with hypothyroidism, depression, hyperlipidemia, hepatic steatosis who presents to the office today as an established patient that last saw me on 6/12/2024.     Here today for rash for at least 3-4 weeks. Under the arms and breasts. It itches and burns. Puttin gitch cream and neosporin. Bra rubs on it. This is new issue. Thinks this is related to heat.     Trouble hearing from right and sinus issues. Ear surgery 3 months ago.     Review of Systems    Allergies   Allergen Reactions    Blue Dyes (Parenteral) Rash    Other Itching and Rash     CHG wipes    Simvastatin Myalgia        Outpatient Medications Marked as Taking for the 7/31/24 encounter (Office Visit) with Kaushik Watters MD   Medication Sig Dispense Refill    calcitriol (ROCALTROL) 0.5 MCG capsule TAKE 1 CAPSULE BY MOUTH DAILY 90 capsule 0    calcium carbonate (OS-DIGNA) 600 MG tablet Take 2 tablets by mouth Daily.      citalopram (CeleXA) 20 MG tablet TAKE 1 TABLET BY MOUTH DAILY 90 tablet 1    dexAMETHasone (DECADRON) 0.1 % ophthalmic solution INSTILL 4 DROPS TO AFFECTED EAR FOUR TIMES DAILY      famotidine (PEPCID) 20 MG tablet Take 1 tablet by mouth At Night As Needed for Indigestion or Heartburn. 60 tablet 1    latanoprost (XALATAN) 0.005 % ophthalmic solution Administer 1 drop to both eyes Every Night.      levothyroxine (SYNTHROID, LEVOTHROID) 112 MCG tablet Take 1 tablet by mouth Every Morning. 90 tablet 1    Mirabegron ER (MYRBETRIQ) 25 MG tablet sustained-release 24 hour 24 hr tablet Take 1 tablet by mouth Daily.      pravastatin (PRAVACHOL) 80 MG tablet Take 1 tablet by mouth Every Night. 90 tablet 2    Tavaborole 5 % solution Apply 1  application  topically Daily. 10 mL 2        Past Medical History:   Diagnosis Date    Colon polyp 1/09/2023    Colonic mass     Diverticulitis     Diverticulosis 1/09/2023    [place] (hard of hearing)     SIGNIFICANT-BILAT HEARING AIDS    Hydronephrosis     RIGHT    Hyperlipidemia     Hyperlipidemia 09/08/2016    Hypothyroidism 9/11 /2001    Thyroid removed    OA (osteoarthritis) of hip     PONV (postoperative nausea and vomiting)     Stress incontinence     Thyroid cancer     RADIOACTIVE IODINE     Past Surgical History:   Procedure Laterality Date    CATARACT EXTRACTION, BILATERAL      COLON RESECTION N/A 2/9/2023    Procedure: laparoscopic right hemicolectomyconverted to open  bilateral ooperectomy;  Surgeon: Norma Barillas MD;  Location: [facility];  Service: General;  Laterality: N/A;    COLON RESECTION N/A 2/22/2023    Procedure: DIAGNOSTIC LAPAROSCOPY WITH OPEN ILLEO-COLOIC RESECTION;  Surgeon: Norma Barillas MD;  Location: [facility];  Service: General;  Laterality: N/A;    COLONOSCOPY N/A 01/09/2023    Procedure: COLONOSCOPY to cecum with biopsy of ileo cecal valve polyp;  Surgeon: Norma Barillas MD;  Location: [facility] ENDOSCOPY;  Service: General;  Laterality: N/A;  PRE - screening  POST - diverticulosis, ileo cecal valve polyp    CYSTOSCOPY W/ URETERAL STENT PLACEMENT Right 2/14/2023    Procedure: CYSTOSCOPY URETERAL CATHETER/STENT INSERTION, Retrograde Pyelogram;  Surgeon: Paul Antonio MD;  Location: [facility];  Service: Urology;  Laterality: Right;    CYSTOSCOPY W/ URETERAL STENT PLACEMENT Right 3/29/2023    Procedure: CYSTOSCOPY , RIGHT STENT exchange, right ureteroscopy, RETROGRADE PYELOGRAM;  Surgeon: Paul Antonio MD;  Location: [facility];  Service: Urology;  Laterality: Right;    EYE SURGERY      Cataracts    LAPAROSCOPIC TUBAL LIGATION      ROTATOR CUFF REPAIR Left     STAPEDECTOMY Right     THYROIDECTOMY Bilateral     TUBAL ABDOMINAL LIGATION      URETEROSCOPY  "LASER LITHOTRIPSY WITH STENT INSERTION Left 5/19/2023    Procedure: CYSTOSCOPY, BIlateral retrogradesRETROGRADE PYELOGRAM, RIGHT stent removal;  Surgeon: Paul Antonio MD;  Location: [facility];  Service: Urology;  Laterality: Left;     Family History   Problem Relation Age of Onset    Heart disease Mother         Late 70s    Cancer Sister     Ovarian cancer Sister     Cancer Maternal Grandmother     Breast cancer Maternal Grandmother     Heart disease Maternal Grandfather     Malig Hyperthermia Neg Hx     reports that she has never smoked. She has never used smokeless tobacco. She reports current alcohol use. She reports that she does not use drugs.    OBJECTIVE    Vital Signs:   /85   Pulse 101   Ht 170.2 cm (67.01\")   Wt 94.6 kg (208 lb 9.6 oz)   SpO2 98%   BMI 32.66 kg/m²     Physical Exam  Constitutional:       General: She is not in acute distress.     Appearance: Normal appearance.   HENT:      Right Ear: Tympanic membrane normal.      Left Ear: Tympanic membrane normal.   Pulmonary:      Effort: Pulmonary effort is normal. No respiratory distress.   Skin:     Comments: Erythematous rash with satellite lesions under her breast and left axilla   Neurological:      Mental Status: She is alert. Mental status is at baseline.   Psychiatric:         Mood and Affect: Mood normal.         Behavior: Behavior normal.         Thought Content: Thought content normal.            The following data was reviewed by: Kaushik Watters MD on 07/31/2024:  CMP          11/6/2023    11:39 3/27/2024    15:06 6/12/2024    10:12   CMP   Glucose 87  86  90    BUN 18  18  14    Creatinine 1.05  1.40  1.01    EGFR 55.5      Sodium 139  144  139    Potassium 4.5  4.3  4.4    Chloride 103  106  102    Calcium 7.8  8.0  8.7    Total Protein   6.7    Albumin 3.9  3.9  4.2    Globulin   2.5    Total Bilirubin   0.4    Alkaline Phosphatase   106    AST (SGOT)   15    ALT (SGPT)   15    BUN/Creatinine Ratio 17.1  13  13.9  "   Anion Gap 12.4        CBC w/diff          8/31/2023    10:31 12/5/2023    12:01   CBC w/Diff   WBC 6.53  7.92    RBC 4.83  5.14    Hemoglobin 11.7  13.0    Hematocrit 37.0  41.5    MCV 76.6  80.7    MCH 24.2  25.3    MCHC 31.6  31.3    RDW 15.1  15.0    Platelets 343  328    Neutrophil Rel % 54.2  66.7    Lymphocyte Rel % 34.9  23.9    Monocyte Rel % 9.0  8.1    Eosinophil Rel % 0.0  0.0    Basophil Rel % 1.4  0.9      Lipid Panel          8/31/2023    10:31 12/5/2023    12:01 6/12/2024    10:12   Lipid Panel   Total Cholesterol 241  232  202    Triglycerides 117  126  107    HDL Cholesterol 57  48  47    VLDL Cholesterol 21  23  19    LDL Cholesterol  163  161  136    LDL/HDL Ratio  3.31  2.84      TSH          11/6/2023    11:39 3/27/2024    15:06 7/2/2024    10:19   TSH   TSH 0.429  0.150  0.508      A1C Last 3 Results          6/12/2024    10:12   HGBA1C Last 3 Results   Hemoglobin A1C 5.80      Data reviewed : Note from last visit              ASSESSMENT & PLAN     Diagnoses and all orders for this visit:    1. Intertrigo (Primary)  -     Ketoconazole-Hydrocortisone 2-2.5 % cream; Apply 1 Application topically Daily for 14 days.  Dispense: 30 g; Refill: 0      Discussed keeping area under breast and axilla dry.  Dry with Tylenol or air dryer on cool setting following shower.  She should avoid wearing a bra whenever possible. Patient counseled to seek medical care for new or worsening symptoms or failure of symptoms to improve.       Recommend she follow-up with ENT for ear issue.      Health Maintenance Due   Topic Date Due    TDAP/TD VACCINES (1 - Tdap) Never done    COVID-19 Vaccine (7 - 2023-24 season) 02/07/2024        Follow Up  No follow-ups on file.    Patient/family had no further questions at this time and verbalized understanding of the plan discussed today.

## 2024-09-12 RX ORDER — PRAVASTATIN SODIUM 80 MG/1
80 TABLET ORAL NIGHTLY
Qty: 90 TABLET | Refills: 2 | Status: SHIPPED | OUTPATIENT
Start: 2024-09-12

## 2024-10-03 DIAGNOSIS — E03.9 HYPOTHYROIDISM, UNSPECIFIED TYPE: ICD-10-CM

## 2024-10-03 RX ORDER — LEVOTHYROXINE SODIUM 112 UG/1
112 TABLET ORAL
Qty: 90 TABLET | Refills: 1 | Status: SHIPPED | OUTPATIENT
Start: 2024-10-03

## 2024-10-03 NOTE — TELEPHONE ENCOUNTER
Rx Refill Note  Requested Prescriptions     Pending Prescriptions Disp Refills    levothyroxine (SYNTHROID, LEVOTHROID) 112 MCG tablet [Pharmacy Med Name: LEVOTHYROXINE 112 MCG TABLET] 90 tablet 1     Sig: TAKE 1 TABLET BY MOUTH EVERY MORNING      Last office visit with prescribing clinician: 3/27/2024   Last telemedicine visit with prescribing clinician: Visit date not found   Next office visit with prescribing clinician: 10/24/2024                         Would you like a call back once the refill request has been completed: [] Yes [] No    If the office needs to give you a call back, can they leave a voicemail: [] Yes [] No    Rola Messina  10/03/24, 10:35 EDT

## 2024-10-24 ENCOUNTER — OFFICE VISIT (OUTPATIENT)
Dept: ENDOCRINOLOGY | Age: 74
End: 2024-10-24
Payer: MEDICARE

## 2024-10-24 VITALS
OXYGEN SATURATION: 99 % | SYSTOLIC BLOOD PRESSURE: 130 MMHG | BODY MASS INDEX: 33.24 KG/M2 | HEIGHT: 67 IN | HEART RATE: 71 BPM | DIASTOLIC BLOOD PRESSURE: 80 MMHG | WEIGHT: 211.8 LBS

## 2024-10-24 DIAGNOSIS — E03.9 HYPOTHYROIDISM, UNSPECIFIED TYPE: Primary | ICD-10-CM

## 2024-10-24 DIAGNOSIS — E20.9 HYPOPARATHYROIDISM, UNSPECIFIED HYPOPARATHYROIDISM TYPE: ICD-10-CM

## 2024-10-24 DIAGNOSIS — Z85.850 HISTORY OF THYROID CANCER: ICD-10-CM

## 2024-10-24 NOTE — PROGRESS NOTES
Chief complaint/Reason for consult:  hypothyroidism and hypoparathyroidism    HPI:   - 74 year old female here for hypothyroidism and hypoparathyroidism  - Was last seen in 3/2024  - Had surgery on her ear since last visit  - She had a total thyroidectomy 20 years ago for thyroid cancer  - She does not know what type of thyroid cancer she had  - Denies missing any does of her levothyroxine 125 mcg, calcium carbonate 600 mg bid, calcitriol 0.5 mcg daily, magnesium  - She takes her levothyroxine at the same time every day, on an empty stomach, and not with hot beverages  - She denies muscle cramps when she takes her calcium but she sometimes forgets       The following portions of the patient's history were reviewed and updated as appropriate: allergies, current medications, past family history, past medical history, past social history, past surgical history, and problem list.      Objective     Vitals:    10/24/24 1121   BP: 130/80   Pulse: 71   SpO2: 99%        Physical Exam  Vitals reviewed.   Constitutional:       Appearance: Normal appearance.   HENT:      Head: Normocephalic and atraumatic.   Eyes:      General: No scleral icterus.  Pulmonary:      Effort: Pulmonary effort is normal. No respiratory distress.   Neurological:      Mental Status: She is alert.      Gait: Gait normal.   Psychiatric:         Mood and Affect: Mood normal.         Behavior: Behavior normal.         Thought Content: Thought content normal.         Judgment: Judgment normal.       Assessment & Plan   Hypothyroidism  2.   History of thyroid cancer  3.   Hypoparathyroidism  - Currently on levothyroxine 112 mcg, calcium carbonate 600 mg bid, calcitriol 0.5 mcg daily, magnesium (she does not know the dose)  - No symptoms of hypocalcemia  - I do not have any information on her thyroid cancer  - Will check TSH, free T4, Tg, BMP     - Return to clinic in 6 months

## 2024-11-04 DIAGNOSIS — E03.9 HYPOTHYROIDISM, UNSPECIFIED TYPE: ICD-10-CM

## 2024-11-04 LAB
BUN SERPL-MCNC: 20 MG/DL (ref 8–27)
BUN/CREAT SERPL: 18 (ref 12–28)
CALCIUM SERPL-MCNC: 8.7 MG/DL (ref 8.7–10.3)
CHLORIDE SERPL-SCNC: 102 MMOL/L (ref 96–106)
CO2 SERPL-SCNC: 23 MMOL/L (ref 20–29)
CREAT SERPL-MCNC: 1.11 MG/DL (ref 0.57–1)
EGFRCR SERPLBLD CKD-EPI 2021: 52 ML/MIN/1.73
GLUCOSE SERPL-MCNC: 85 MG/DL (ref 70–99)
POTASSIUM SERPL-SCNC: 4.6 MMOL/L (ref 3.5–5.2)
REPORT: NORMAL
SODIUM SERPL-SCNC: 141 MMOL/L (ref 134–144)
T4 FREE SERPL-MCNC: 1.42 NG/DL (ref 0.82–1.77)
THYROGLOB SERPL-MCNC: <2 NG/ML
TSH SERPL DL<=0.005 MIU/L-ACNC: 0.35 UIU/ML (ref 0.45–4.5)

## 2024-11-04 RX ORDER — LEVOTHYROXINE SODIUM 100 UG/1
100 TABLET ORAL
Qty: 90 TABLET | Refills: 1 | Status: SHIPPED | OUTPATIENT
Start: 2024-11-04

## 2024-11-05 ENCOUNTER — TELEPHONE (OUTPATIENT)
Dept: ENDOCRINOLOGY | Age: 74
End: 2024-11-05
Payer: MEDICARE

## 2024-11-05 NOTE — TELEPHONE ENCOUNTER
----- Message from Neville Olson sent at 11/4/2024  3:22 PM EST -----  Can she have lab work in 2 months please.  Thank you

## 2024-12-05 ENCOUNTER — LAB (OUTPATIENT)
Dept: INTERNAL MEDICINE | Facility: CLINIC | Age: 74
End: 2024-12-05
Payer: MEDICARE

## 2024-12-05 DIAGNOSIS — I10 HYPERTENSION, UNSPECIFIED TYPE: ICD-10-CM

## 2024-12-05 DIAGNOSIS — E89.0 POSTOPERATIVE HYPOTHYROIDISM: Primary | ICD-10-CM

## 2024-12-05 DIAGNOSIS — E78.5 HYPERLIPIDEMIA, UNSPECIFIED HYPERLIPIDEMIA TYPE: ICD-10-CM

## 2024-12-05 DIAGNOSIS — E03.9 HYPOTHYROIDISM, UNSPECIFIED TYPE: ICD-10-CM

## 2024-12-05 DIAGNOSIS — E78.00 PURE HYPERCHOLESTEROLEMIA: ICD-10-CM

## 2024-12-05 DIAGNOSIS — R73.9 ELEVATED RANDOM BLOOD GLUCOSE LEVEL: ICD-10-CM

## 2024-12-06 LAB
ALBUMIN SERPL-MCNC: 4 G/DL (ref 3.5–5.2)
ALBUMIN/GLOB SERPL: 1.5 G/DL
ALP SERPL-CCNC: 90 U/L (ref 39–117)
ALT SERPL-CCNC: 9 U/L (ref 1–33)
AST SERPL-CCNC: 14 U/L (ref 1–32)
BASOPHILS # BLD AUTO: 0.06 10*3/MM3 (ref 0–0.2)
BASOPHILS NFR BLD AUTO: 1 % (ref 0–1.5)
BILIRUB SERPL-MCNC: 0.4 MG/DL (ref 0–1.2)
BUN SERPL-MCNC: 15 MG/DL (ref 8–23)
BUN/CREAT SERPL: 13.4 (ref 7–25)
CALCIUM SERPL-MCNC: 7.9 MG/DL (ref 8.6–10.5)
CHLORIDE SERPL-SCNC: 103 MMOL/L (ref 98–107)
CHOLEST SERPL-MCNC: 209 MG/DL (ref 0–200)
CO2 SERPL-SCNC: 26.1 MMOL/L (ref 22–29)
CREAT SERPL-MCNC: 1.12 MG/DL (ref 0.57–1)
EGFRCR SERPLBLD CKD-EPI 2021: 51.1 ML/MIN/1.73
EOSINOPHIL # BLD AUTO: 0 10*3/MM3 (ref 0–0.4)
EOSINOPHIL NFR BLD AUTO: 0 % (ref 0.3–6.2)
ERYTHROCYTE [DISTWIDTH] IN BLOOD BY AUTOMATED COUNT: 12.7 % (ref 12.3–15.4)
GLOBULIN SER CALC-MCNC: 2.6 GM/DL
GLUCOSE SERPL-MCNC: 87 MG/DL (ref 65–99)
HBA1C MFR BLD: 5.7 % (ref 4.8–5.6)
HCT VFR BLD AUTO: 42 % (ref 34–46.6)
HDLC SERPL-MCNC: 47 MG/DL (ref 40–60)
HGB BLD-MCNC: 13.8 G/DL (ref 12–15.9)
IMM GRANULOCYTES # BLD AUTO: 0.01 10*3/MM3 (ref 0–0.05)
IMM GRANULOCYTES NFR BLD AUTO: 0.2 % (ref 0–0.5)
LDLC SERPL CALC-MCNC: 145 MG/DL (ref 0–100)
LYMPHOCYTES # BLD AUTO: 1.75 10*3/MM3 (ref 0.7–3.1)
LYMPHOCYTES NFR BLD AUTO: 29.5 % (ref 19.6–45.3)
MCH RBC QN AUTO: 29.1 PG (ref 26.6–33)
MCHC RBC AUTO-ENTMCNC: 32.9 G/DL (ref 31.5–35.7)
MCV RBC AUTO: 88.4 FL (ref 79–97)
MONOCYTES # BLD AUTO: 0.46 10*3/MM3 (ref 0.1–0.9)
MONOCYTES NFR BLD AUTO: 7.8 % (ref 5–12)
NEUTROPHILS # BLD AUTO: 3.65 10*3/MM3 (ref 1.7–7)
NEUTROPHILS NFR BLD AUTO: 61.5 % (ref 42.7–76)
NRBC BLD AUTO-RTO: 0 /100 WBC (ref 0–0.2)
PLATELET # BLD AUTO: 290 10*3/MM3 (ref 140–450)
POTASSIUM SERPL-SCNC: 4.3 MMOL/L (ref 3.5–5.2)
PROT SERPL-MCNC: 6.6 G/DL (ref 6–8.5)
RBC # BLD AUTO: 4.75 10*6/MM3 (ref 3.77–5.28)
SODIUM SERPL-SCNC: 140 MMOL/L (ref 136–145)
TRIGL SERPL-MCNC: 96 MG/DL (ref 0–150)
TSH SERPL DL<=0.005 MIU/L-ACNC: 0.56 UIU/ML (ref 0.27–4.2)
VLDLC SERPL CALC-MCNC: 17 MG/DL (ref 5–40)
WBC # BLD AUTO: 5.93 10*3/MM3 (ref 3.4–10.8)

## 2024-12-10 ENCOUNTER — OFFICE VISIT (OUTPATIENT)
Dept: INTERNAL MEDICINE | Facility: CLINIC | Age: 74
End: 2024-12-10
Payer: MEDICARE

## 2024-12-10 VITALS
OXYGEN SATURATION: 98 % | BODY MASS INDEX: 32.99 KG/M2 | HEART RATE: 76 BPM | WEIGHT: 210.2 LBS | SYSTOLIC BLOOD PRESSURE: 116 MMHG | HEIGHT: 67 IN | DIASTOLIC BLOOD PRESSURE: 78 MMHG

## 2024-12-10 DIAGNOSIS — Z00.00 MEDICARE ANNUAL WELLNESS VISIT, SUBSEQUENT: Primary | ICD-10-CM

## 2024-12-10 DIAGNOSIS — H60.391 OTHER INFECTIVE ACUTE OTITIS EXTERNA OF RIGHT EAR: ICD-10-CM

## 2024-12-10 DIAGNOSIS — Z12.31 ENCOUNTER FOR SCREENING MAMMOGRAM FOR MALIGNANT NEOPLASM OF BREAST: ICD-10-CM

## 2024-12-10 DIAGNOSIS — D12.2 ADENOMATOUS POLYP OF ASCENDING COLON: ICD-10-CM

## 2024-12-10 DIAGNOSIS — Z78.0 POST-MENOPAUSE: ICD-10-CM

## 2024-12-10 PROCEDURE — 99213 OFFICE O/P EST LOW 20 MIN: CPT | Performed by: STUDENT IN AN ORGANIZED HEALTH CARE EDUCATION/TRAINING PROGRAM

## 2024-12-10 PROCEDURE — G0439 PPPS, SUBSEQ VISIT: HCPCS | Performed by: STUDENT IN AN ORGANIZED HEALTH CARE EDUCATION/TRAINING PROGRAM

## 2024-12-10 PROCEDURE — 1126F AMNT PAIN NOTED NONE PRSNT: CPT | Performed by: STUDENT IN AN ORGANIZED HEALTH CARE EDUCATION/TRAINING PROGRAM

## 2024-12-10 RX ORDER — CIPROFLOXACIN/HYDROCORTISONE 0.2 %-1 %
3 SUSPENSION, DROPS(FINAL DOSAGE FORM)(ML) OTIC (EAR) 2 TIMES DAILY
Qty: 10 ML | Refills: 0 | Status: SHIPPED | OUTPATIENT
Start: 2024-12-10 | End: 2024-12-12

## 2024-12-10 NOTE — PROGRESS NOTES
Subjective   The ABCs of the Annual Wellness Visit  Medicare Wellness Visit      Stefani Watson is a 74 y.o. with hypothyroidism, depression, hyperlipidemia, hepatic steatosis patient who presents for a Medicare Wellness Visit.    The following portions of the patient's history were reviewed and   updated as appropriate: allergies, current medications, past family history, past medical history, past social history, past surgical history, and problem list.    Compared to one year ago, the patient's physical   health is the same.  Compared to one year ago, the patient's mental   health is worse. Mood is down. Stressed with dentures, election, and weather.     Recent Hospitalizations:  She was not admitted to the hospital during the last year.     Current Medical Providers:  Patient Care Team:  Kaushik Watters MD as PCP - General (Internal Medicine)  Neville Olson DO as Consulting Physician (Endocrinology)  Cezar Antonio MD as Consulting Physician (Otolaryngology)  Severtson, Mark A, MD as Consulting Physician (Otolaryngology)    Outpatient Medications Prior to Visit   Medication Sig Dispense Refill    calcitriol (ROCALTROL) 0.5 MCG capsule TAKE 1 CAPSULE BY MOUTH DAILY 90 capsule 0    calcium carbonate (OS-DIGNA) 600 MG tablet Take 2 tablets by mouth Daily.      citalopram (CeleXA) 20 MG tablet TAKE 1 TABLET BY MOUTH DAILY 90 tablet 1    dexAMETHasone (DECADRON) 0.1 % ophthalmic solution       famotidine (PEPCID) 20 MG tablet Take 1 tablet by mouth At Night As Needed for Indigestion or Heartburn. 60 tablet 1    latanoprost (XALATAN) 0.005 % ophthalmic solution Administer 1 drop to both eyes Every Night.      levothyroxine (SYNTHROID, LEVOTHROID) 100 MCG tablet Take 1 tablet by mouth Every Morning. 90 tablet 1    Mirabegron ER (MYRBETRIQ) 25 MG tablet sustained-release 24 hour 24 hr tablet Take 1 tablet by mouth Daily.      pravastatin (PRAVACHOL) 80 MG tablet TAKE ONE TABLET BY MOUTH ONCE NIGHTLY 90 tablet 2     "Tavaborole 5 % solution Apply 1 application  topically Daily. 10 mL 2     No facility-administered medications prior to visit.     No opioid medication identified on active medication list. I have reviewed chart for other potential  high risk medication/s and harmful drug interactions in the elderly.      Aspirin is not on active medication list.  Aspirin use is not indicated based on review of current medical condition/s. Risk of harm outweighs potential benefits.  .    Patient Active Problem List   Diagnosis    Hypothyroid    Depressive disorder    Diverticular disease of colon    Hearing loss    Hyperlipidemia    Osteoarthritis    Primary malignant neoplasm of thyroid gland    postmenopausal, asymptomatic Bilateral ovarian cysts    Arthritis    Encounter for screening colonoscopy    History of colon polyps    Monoallelic mutation of MUTYH gene    Malignant neoplasm of ascending colon    Family history of ovarian cancer    Family history of breast cancer    Hepatic steatosis    Nausea and vomiting, unspecified vomiting type    Hydroureteronephrosis    SBO (small bowel obstruction)    Class 1 obesity with serious comorbidity and body mass index (BMI) of 33.0 to 33.9 in adult     Advance Care Planning Advance Directive is not on file.  ACP discussion was held with the patient during this visit. Patient does not have an advance directive, information provided.            Objective   Vitals:    12/10/24 0840   BP: 116/78   Pulse: 76   SpO2: 98%   Weight: 95.3 kg (210 lb 3.2 oz)   Height: 170.2 cm (67.01\")   PainSc: 0-No pain       Estimated body mass index is 32.91 kg/m² as calculated from the following:    Height as of this encounter: 170.2 cm (67.01\").    Weight as of this encounter: 95.3 kg (210 lb 3.2 oz).            Does the patient have evidence of cognitive impairment? No  Lab Results   Component Value Date    CHLPL 209 (H) 12/05/2024    TRIG 96 12/05/2024    HDL 47 12/05/2024     (H) 12/05/2024    VLDL " 17 2024    HGBA1C 5.70 (H) 2024                                                                                                Health  Risk Assessment    Smoking Status:  Social History     Tobacco Use   Smoking Status Never   Smokeless Tobacco Never     Alcohol Consumption:  Social History     Substance and Sexual Activity   Alcohol Use Yes    Comment: Occasionally       Fall Risk Screen  STEADI Fall Risk Assessment was completed, and patient is at LOW risk for falls.Assessment completed on:12/10/2024    Depression Screening   Little interest or pleasure in doing things? Not at all   Feeling down, depressed, or hopeless? Not at all   PHQ-2 Total Score 0      Health Habits and Functional and Cognitive Screenin/3/2024     2:59 PM   Functional & Cognitive Status   Do you have difficulty preparing food and eating? No    Do you have difficulty bathing yourself, getting dressed or grooming yourself? No    Do you have difficulty using the toilet? No    Do you have difficulty moving around from place to place? No    Do you have trouble with steps or getting out of a bed or a chair? No    Current Diet Unhealthy Diet    Dental Exam Up to date    Eye Exam Up to date    Exercise (times per week) 6 times per week    Current Exercises Include Walking    Do you need help using the phone?  No    Are you deaf or do you have serious difficulty hearing?  Yes    Do you need help to go to places out of walking distance? No    Do you need help shopping? No    Do you need help preparing meals?  No    Do you need help with housework?  No    Do you need help with laundry? No    Do you need help taking your medications? No    Do you need help managing money? No    Do you ever drive or ride in a car without wearing a seat belt? No    Have you felt unusual stress, anger or loneliness in the last month? Yes    Who do you live with? Alone    If you need help, do you have trouble finding someone available to you? No    Have  you been bothered in the last four weeks by sexual problems? No    Do you have difficulty concentrating, remembering or making decisions? No        Patient-reported           Age-appropriate Screening Schedule:  Refer to the list below for future screening recommendations based on patient's age, sex and/or medical conditions. Orders for these recommended tests are listed in the plan section. The patient has been provided with a written plan.    Health Maintenance List  Health Maintenance   Topic Date Due    TDAP/TD VACCINES (1 - Tdap) Never done    DXA SCAN  11/17/2024    ANNUAL WELLNESS VISIT  12/05/2024    BMI FOLLOWUP  06/12/2025    LIPID PANEL  12/05/2025    HEPATITIS C SCREENING  Completed    COVID-19 Vaccine  Completed    RSV Vaccine - Adults  Completed    INFLUENZA VACCINE  Completed    Pneumococcal Vaccine 65+  Completed    MAMMOGRAM  Discontinued    ZOSTER VACCINE  Discontinued    COLORECTAL CANCER SCREENING  Discontinued                                                                                                                                                CMS Preventative Services Quick Reference  Risk Factors Identified During Encounter  None Identified    The above risks/problems have been discussed with the patient.  Pertinent information has been shared with the patient in the After Visit Summary.  An After Visit Summary and PPPS were made available to the patient.    Follow Up:   Next Medicare Wellness visit to be scheduled in 1 year.         Additional E&M Note during same encounter follows:  Patient has additional, significant, and separately identifiable condition(s)/problem(s) that require work above and beyond the Medicare Wellness Visit     Chief Complaint  Medicare Wellness-subsequent    Subjective   HPI  Stefani is also being seen today for additional medical problem/s.        Sub-6 mm micronodules within the right lung as described. A follow-up  chest CT could be considered in 1 year to  "evaluate for stability    She follows with endocrinology for hypothyroidism and history of thyroid cancer.     Depression-on Celexa     Hip osteoarthritis-on Mobic     Iron deficiency anemia has resolved however iron levels have remained low.  She had a colonoscopy last year.      HLD- pravastatin.    Rash comes and goes.     On citalopram for years. Stressed with dentures, election, and weather.  Went off once but was advised to start again.     Right ear discomfort. Sharp pain once and a while. Some issues hearing on that side. Ear surgery on that side last year. Feels plugged. Ear drops helped in past (dexamethasone and tavabarole). Feels sinus issue.      Objective   Vital Signs:  /78   Pulse 76   Ht 170.2 cm (67.01\")   Wt 95.3 kg (210 lb 3.2 oz)   SpO2 98%   BMI 32.91 kg/m²   Physical Exam  Constitutional:       Appearance: Normal appearance.   HENT:      Right Ear: Drainage present. Tympanic membrane is erythematous.      Left Ear: Tympanic membrane normal.      Ears:      Comments: Purulent drainage from right ear  Cardiovascular:      Rate and Rhythm: Normal rate and regular rhythm.      Heart sounds: Normal heart sounds. No murmur heard.  Pulmonary:      Effort: Pulmonary effort is normal.      Breath sounds: Normal breath sounds.   Abdominal:      General: Abdomen is flat. There is no distension.      Palpations: Abdomen is soft.      Tenderness: There is no abdominal tenderness.   Skin:     General: Skin is warm and dry.   Neurological:      Mental Status: She is alert.   Psychiatric:         Mood and Affect: Mood normal.         Behavior: Behavior normal.         Thought Content: Thought content normal.         The following data was reviewed by: Kaushik Watters MD on 12/10/2024:  Data reviewed : GI studies previous colonoscopy  CMP          6/12/2024    10:12 10/24/2024    11:40 12/5/2024    10:34   CMP   Glucose 90  85  87    BUN 14  20  15    Creatinine 1.01  1.11  1.12    Sodium 139  141  140  "   Potassium 4.4  4.6  4.3    Chloride 102  102  103    Calcium 8.7  8.7  7.9    Total Protein 6.7   6.6    Albumin 4.2   4.0    Globulin 2.5   2.6    Total Bilirubin 0.4   0.4    Alkaline Phosphatase 106   90    AST (SGOT) 15   14    ALT (SGPT) 15   9    BUN/Creatinine Ratio 13.9  18  13.4      CBC w/diff          12/5/2024    10:34   CBC w/Diff   WBC 5.93    RBC 4.75    Hemoglobin 13.8    Hematocrit 42.0    MCV 88.4    MCH 29.1    MCHC 32.9    RDW 12.7    Platelets 290    Neutrophil Rel % 61.5    Lymphocyte Rel % 29.5    Monocyte Rel % 7.8    Eosinophil Rel % 0.0    Basophil Rel % 1.0      Lipid Panel          6/12/2024    10:12 12/5/2024    10:34   Lipid Panel   Total Cholesterol 202  209    Triglycerides 107  96    HDL Cholesterol 47  47    VLDL Cholesterol 19  17    LDL Cholesterol  136  145    LDL/HDL Ratio 2.84       TSH          7/2/2024    10:19 10/24/2024    11:40 12/5/2024    10:34   TSH   TSH 0.508  0.346  0.562      A1C Last 3 Results          6/12/2024    10:12 12/5/2024    10:34   HGBA1C Last 3 Results   Hemoglobin A1C 5.80  5.70              Assessment and Plan            Medicare annual wellness visit, subsequent         Post-menopause    Orders:    DEXA Bone Density Axial    Encounter for screening mammogram for malignant neoplasm of breast    Orders:    Mammo screening digital tomosynthesis bilateral w CAD; Future    Other infective acute otitis externa of right ear  Follow-up with ENT if no improvement  Orders:    ciprofloxacin-hydrocortisone (Cipro HC) 0.2-1 % otic suspension; Administer 3 drops to the right ear 2 (Two) Times a Day for 7 days.    Adenomatous polyp of ascending colon  History of carpet polyp.  She had surgery including open right hemicolectomy and BSO, followed by an open ileocolic resection for SBO 2/23/23. Her pathology was a T1N0.  Touch base with surgery to see if they would recommend repeat screening colonoscopy in future.               Follow Up   No follow-ups on  file.  Patient was given instructions and counseling regarding her condition or for health maintenance advice. Please see specific information pulled into the AVS if appropriate.

## 2024-12-12 ENCOUNTER — PREP FOR SURGERY (OUTPATIENT)
Dept: OTHER | Facility: HOSPITAL | Age: 74
End: 2024-12-12
Payer: MEDICARE

## 2024-12-12 ENCOUNTER — TELEPHONE (OUTPATIENT)
Dept: INTERNAL MEDICINE | Facility: CLINIC | Age: 74
End: 2024-12-12

## 2024-12-12 DIAGNOSIS — F32.A DEPRESSIVE DISORDER: ICD-10-CM

## 2024-12-12 DIAGNOSIS — Z86.0100 HISTORY OF COLON POLYPS: Primary | ICD-10-CM

## 2024-12-12 DIAGNOSIS — Z12.11 ENCOUNTER FOR SCREENING COLONOSCOPY: ICD-10-CM

## 2024-12-12 RX ORDER — CIPROFLOXACIN AND DEXAMETHASONE 3; 1 MG/ML; MG/ML
4 SUSPENSION/ DROPS AURICULAR (OTIC) 2 TIMES DAILY
Qty: 7.5 ML | Refills: 0 | Status: SHIPPED | OUTPATIENT
Start: 2024-12-12 | End: 2024-12-19

## 2024-12-12 RX ORDER — CITALOPRAM HYDROBROMIDE 20 MG/1
20 TABLET ORAL DAILY
Qty: 90 TABLET | Refills: 3 | Status: SHIPPED | OUTPATIENT
Start: 2024-12-12

## 2024-12-12 NOTE — TELEPHONE ENCOUNTER
Pharmacy Name: [facility] [id number] [place], [place] - [address] [address] AT [address] - [phone]  - [phone]      Pharmacy representative name: FABIEN    Pharmacy representative phone number: [phone]     What medication are you calling in regards to: ciprofloxacin-hydrocortisone (Cipro HC) 0.2-1 % otic suspension     What question does the pharmacy have: MEDICATION NOT COVERED BY INSURANCE AND WILL COST PATIENT OV ER $400 WANTING TO KNOW IF CAN REPLACE WITH DIFFERENT MEDICATION - CIPRO DEX IS COVERED BY INSURANCE     Who is the provider that prescribed the medication: DR. BOB

## 2024-12-13 ENCOUNTER — TELEPHONE (OUTPATIENT)
Dept: SURGERY | Facility: CLINIC | Age: 74
End: 2024-12-13
Payer: MEDICARE

## 2024-12-13 NOTE — TELEPHONE ENCOUNTER
FOLLOW UP:  NON INTERVENTIONAL PAIN MANAGEMENT    PRIMARY CARE PHYSICIAN:  Godwin Guerrero DO    Chief Complaint   Patient presents with    Medication Management    Office Visit    Back Pain     LBP    Neck Pain    Pain     L side of body from shoulder to toes       ASSESSMENT:  1. Failed back surgical syndrome    2. Chronic pain syndrome          PLAN:  Medical Modalities:  MS Contin 15 mg twice daily  OTC acetaminophen not to exceed 3 g daily    Lifestyle & Behavioral:  Stress reduction, self-care, sense of purpose  Sleep and bowel hygiene  Pain Neuroscience Education concepts reinforced  Additional resources in AVS  Regular primary care follow-up for routine health maintenance  Work/rest pacing  Recommend regular meditation to calm the nervous system    Labs/Imaging:  UDS next visit    Manual Therapies:   Continuing PT    Total visit time 30 minutes. In addition to face to face time, this includes time spent reviewing past medical documents, labs, imaging, referral notes; and coordination of care.     Follow up Return in about 3 months (around 8/20/2024).    INTERVAL HISTORY:  Chauncey Burroughs is a 62 year old male returning for follow-up regarding pain and multiple other symptoms. Presumed metal allergy, appt with Allergist next month, spine surgeon plans to remove hardware. Mood changes, brain fog, overall feels terrible.  Left-sided neck and chest pain that hurts when he breathes.  To ER visits and an urgent care visit since I last saw him a month ago.  He has also been in contact with 2 spine surgeons.  He needs his cardiologist and primary care provider to document his ability to undergo surgery.  He hopes it gets done as soon as possible.  Has been eating a clean diet and trying everything he can think of to be the healthiest person possible, but global symptoms continue to get worse.      MEDICATION THERAPY MONITORING:  Activity/ADLs:  Mostly independent  Analgesia:  Fair  Adverse effects:   --ADDEND--  12/13/24 @ 9:16 AM  Voicemail left for patient to return call to schedule colonoscopy.    ----- Message from Norma Barillas sent at 12/12/2024 10:55 AM EST -----  Regarding: OR scheduling   Please call to schedule c scope   None  Aberrant behavior:  None    Affect:  Anxious    CONTROLLED SUBSTANCE RISK ASSESSMENT:  COAT goals:  Less pain  ORT 3  PDMP reviewed:  Yes  Most recent UDS:  Consistent  Overall opioid risk is deemed to be low to moderate  Has patient been prescribed naloxone?  Yes  Opioid Consent/Agreement:  Intact  Current MME:  30    CURRENT MEDICATIONS  Per Epic Record.    TREATMENTS PREVIOUSLY TRIED:  Manual therapies:             Physical therapy:  Equivocal             Chiropractic care:  None             Acupuncture:  Ineffective             TENS unit:  Ineffective  Interventions:             Spinal or joint surgery:  Multiple laminectomies, fusions, revisions  Spinal or joint injections:  Ineffective  Trigger point injections:  Ineffective  Other:  Spinal cord stimulator-ineffective   intrathecal pain pump x2 - effective, complications  Pharmacologic:             OTC:                        Acetaminophen:  Effective                        Supplements:  Ineffective             NSAIDS:  Ibuprofen, naproxen, diclofenac, celecoxib-ALLERGY             Anticonvulsants:                        Gabapentin:  ALLERGY                        Pregabalin:  ALLERGY                        Topiramate:  Ineffective                        Other:  Zonisamide, oxcarbazepine, Keppra-ineffective    carbamazepine, Depakote-ALLERGY             Antidepressants:                        SNRI:  Duloxetine-ALLERGY  milnacipran-ineffective                        TCA:  Amitriptyline-ineffective  nortriptyline-ALLERGY             Muscle Relaxers:  Cyclobenzaprine, tizanidine-ALLERGY   tizanidine, metaxalone, methocarbamol-ineffective             Opioids: buprenorphine, tramadol, hydrocodone, oxycodone, morphine, oxymorphone, fentanyl, methadone - effective             Topicals: helpful             Other: CBD, THC - ineffective  Sublingual ketamine - ineffective                                          Diazepam-effective    ALLERGIES:  Reviewed.    ROS  Negative except as listed in HPI.    PHYSICAL EXAM:  Visit Vitals  Ht 5' 8\" (1.727 m)   Wt 70.9 kg (156 lb 4.9 oz)   BMI 23.77 kg/m²     General:  Alert, appropriate.  Does not appear somnolent nor intoxicated.  Pacing, uncomfortable.  HENT: Mucous membranes moist  Eyes: Pupils are not inappropriately constricted or dilated.  Conjunctivae pink.  Sclera anicteric  Neck:  Supple.    Respiratory: Respirations unlabored.   Peripheral Vascular:   No peripheral edema.  Integumentary:  Warm without rash  Musculoskeletal:  Unchanged  Neurologic:  Unchanged    PERTINENT IMAGING REPORTS AND DIAGNOSTIC STUDIES:  27 CTs and 21 x-rays in the last year   05/16/2024 BUN 19 creatinine 0.69 GFR 90 glucose 219 sodium 134 potassium 4.6        Supervising physician Dr. Allyn Montoya

## 2024-12-31 ENCOUNTER — TELEPHONE (OUTPATIENT)
Dept: ENDOCRINOLOGY | Age: 74
End: 2024-12-31

## 2024-12-31 DIAGNOSIS — E03.9 HYPOTHYROIDISM, UNSPECIFIED TYPE: Primary | ICD-10-CM

## 2024-12-31 DIAGNOSIS — Z85.850 HISTORY OF THYROID CANCER: ICD-10-CM

## 2024-12-31 DIAGNOSIS — E20.9 HYPOPARATHYROIDISM, UNSPECIFIED HYPOPARATHYROIDISM TYPE: ICD-10-CM

## 2024-12-31 NOTE — TELEPHONE ENCOUNTER
PT IS WANTING TO GO TO ANOTHER OUTSIDE [facility] LOCATION TO GET HER LABS DONE BUT WE NEED TO HAVE LABS IN THE SYSTEM PLEASE.      THANK YOU

## 2025-01-02 ENCOUNTER — TRANSCRIBE ORDERS (OUTPATIENT)
Dept: ADMINISTRATIVE | Facility: HOSPITAL | Age: 75
End: 2025-01-02
Payer: MEDICARE

## 2025-01-02 ENCOUNTER — LAB (OUTPATIENT)
Dept: LAB | Facility: HOSPITAL | Age: 75
End: 2025-01-02
Payer: MEDICARE

## 2025-01-02 DIAGNOSIS — E20.9 HYPOPARATHYROIDISM, UNSPECIFIED HYPOPARATHYROIDISM TYPE: ICD-10-CM

## 2025-01-02 DIAGNOSIS — Z85.850 HISTORY OF THYROID CANCER: ICD-10-CM

## 2025-01-02 DIAGNOSIS — E03.9 HYPOTHYROIDISM, UNSPECIFIED TYPE: ICD-10-CM

## 2025-01-02 DIAGNOSIS — E03.9 HYPOTHYROIDISM, UNSPECIFIED TYPE: Primary | ICD-10-CM

## 2025-01-02 LAB
ALBUMIN SERPL-MCNC: 3.9 G/DL (ref 3.5–5.2)
ANION GAP SERPL CALCULATED.3IONS-SCNC: 9.3 MMOL/L (ref 5–15)
BUN SERPL-MCNC: 13 MG/DL (ref 8–23)
BUN/CREAT SERPL: 12.3 (ref 7–25)
CALCIUM SPEC-SCNC: 7.7 MG/DL (ref 8.6–10.5)
CHLORIDE SERPL-SCNC: 103 MMOL/L (ref 98–107)
CO2 SERPL-SCNC: 24.7 MMOL/L (ref 22–29)
CREAT SERPL-MCNC: 1.06 MG/DL (ref 0.57–1)
EGFRCR SERPLBLD CKD-EPI 2021: 54.6 ML/MIN/1.73
GLUCOSE SERPL-MCNC: 95 MG/DL (ref 65–99)
PHOSPHATE SERPL-MCNC: 3.9 MG/DL (ref 2.5–4.5)
POTASSIUM SERPL-SCNC: 4.2 MMOL/L (ref 3.5–5.2)
SODIUM SERPL-SCNC: 137 MMOL/L (ref 136–145)
T4 FREE SERPL-MCNC: 1.37 NG/DL (ref 0.92–1.68)
TSH SERPL DL<=0.05 MIU/L-ACNC: 1.27 UIU/ML (ref 0.27–4.2)

## 2025-01-02 PROCEDURE — 84439 ASSAY OF FREE THYROXINE: CPT | Performed by: INTERNAL MEDICINE

## 2025-01-02 PROCEDURE — 84443 ASSAY THYROID STIM HORMONE: CPT | Performed by: INTERNAL MEDICINE

## 2025-01-02 PROCEDURE — 84432 ASSAY OF THYROGLOBULIN: CPT

## 2025-01-02 PROCEDURE — 36415 COLL VENOUS BLD VENIPUNCTURE: CPT

## 2025-01-02 PROCEDURE — 80069 RENAL FUNCTION PANEL: CPT

## 2025-01-20 LAB — THYROGLOB SERPL-MCNC: <2 NG/ML

## 2025-02-17 ENCOUNTER — HOSPITAL ENCOUNTER (OUTPATIENT)
Dept: MAMMOGRAPHY | Facility: HOSPITAL | Age: 75
Discharge: HOME | End: 2025-02-17
Payer: MEDICARE

## 2025-02-17 ENCOUNTER — HOSPITAL ENCOUNTER (OUTPATIENT)
Dept: BONE DENSITY | Facility: HOSPITAL | Age: 75
Discharge: HOME | End: 2025-02-17
Payer: MEDICARE

## 2025-02-17 DIAGNOSIS — Z12.31 ENCOUNTER FOR SCREENING MAMMOGRAM FOR MALIGNANT NEOPLASM OF BREAST: ICD-10-CM

## 2025-02-17 PROCEDURE — 77067 SCR MAMMO BI INCL CAD: CPT

## 2025-02-17 PROCEDURE — 77063 BREAST TOMOSYNTHESIS BI: CPT

## 2025-02-17 PROCEDURE — 77080 DXA BONE DENSITY AXIAL: CPT

## 2025-02-24 ENCOUNTER — ANESTHESIA (OUTPATIENT)
Dept: GASTROENTEROLOGY | Facility: HOSPITAL | Age: 75
End: 2025-02-24
Payer: MEDICARE

## 2025-02-24 ENCOUNTER — HOSPITAL ENCOUNTER (OUTPATIENT)
Facility: HOSPITAL | Age: 75
Setting detail: HOSPITAL OUTPATIENT SURGERY
Discharge: HOME | End: 2025-02-24
Attending: SURGERY | Admitting: SURGERY
Payer: MEDICARE

## 2025-02-24 ENCOUNTER — ANESTHESIA EVENT (OUTPATIENT)
Dept: GASTROENTEROLOGY | Facility: HOSPITAL | Age: 75
End: 2025-02-24
Payer: MEDICARE

## 2025-02-24 VITALS
DIASTOLIC BLOOD PRESSURE: 78 MMHG | BODY MASS INDEX: 33.61 KG/M2 | WEIGHT: 209.1 LBS | OXYGEN SATURATION: 99 % | SYSTOLIC BLOOD PRESSURE: 140 MMHG | RESPIRATION RATE: 20 BRPM | HEIGHT: 66 IN | HEART RATE: 49 BPM

## 2025-02-24 DIAGNOSIS — Z12.11 ENCOUNTER FOR SCREENING COLONOSCOPY: ICD-10-CM

## 2025-02-24 DIAGNOSIS — Z86.0100 HISTORY OF COLON POLYPS: ICD-10-CM

## 2025-02-24 PROCEDURE — 25810000003 LACTATED RINGERS PER 1000 ML: Performed by: SURGERY

## 2025-02-24 PROCEDURE — 25010000002 GLUCAGON (RDNA) PER 1 MG: Performed by: SURGERY

## 2025-02-24 PROCEDURE — 45380 COLONOSCOPY AND BIOPSY: CPT | Performed by: SURGERY

## 2025-02-24 PROCEDURE — 25010000002 PROPOFOL 200 MG/20ML EMULSION: Performed by: ANESTHESIOLOGY

## 2025-02-24 PROCEDURE — 88305 TISSUE EXAM BY PATHOLOGIST: CPT | Performed by: SURGERY

## 2025-02-24 RX ORDER — SODIUM CHLORIDE 0.9 % (FLUSH) 0.9 %
10 SYRINGE (ML) INJECTION AS NEEDED
Status: DISCONTINUED | OUTPATIENT
Start: 2025-02-24 | End: 2025-02-24 | Stop reason: HOSPADM

## 2025-02-24 RX ORDER — SODIUM CHLORIDE, SODIUM LACTATE, POTASSIUM CHLORIDE, CALCIUM CHLORIDE 600; 310; 30; 20 MG/100ML; MG/100ML; MG/100ML; MG/100ML
1000 INJECTION, SOLUTION INTRAVENOUS CONTINUOUS
Status: DISCONTINUED | OUTPATIENT
Start: 2025-02-24 | End: 2025-02-24 | Stop reason: HOSPADM

## 2025-02-24 RX ORDER — LIDOCAINE HYDROCHLORIDE 10 MG/ML
0.5 INJECTION, SOLUTION INFILTRATION; PERINEURAL ONCE AS NEEDED
Status: DISCONTINUED | OUTPATIENT
Start: 2025-02-24 | End: 2025-02-24 | Stop reason: HOSPADM

## 2025-02-24 RX ORDER — IBUPROFEN 600 MG/1
TABLET ORAL AS NEEDED
Status: DISCONTINUED | OUTPATIENT
Start: 2025-02-24 | End: 2025-02-24 | Stop reason: HOSPADM

## 2025-02-24 RX ORDER — PROPOFOL 10 MG/ML
INJECTION, EMULSION INTRAVENOUS CONTINUOUS PRN
Status: DISCONTINUED | OUTPATIENT
Start: 2025-02-24 | End: 2025-02-24 | Stop reason: SURG

## 2025-02-24 RX ADMIN — SODIUM CHLORIDE, POTASSIUM CHLORIDE, SODIUM LACTATE AND CALCIUM CHLORIDE 1000 ML: 600; 310; 30; 20 INJECTION, SOLUTION INTRAVENOUS at 09:24

## 2025-02-24 RX ADMIN — PROPOFOL 50 MCG/KG/MIN: 10 INJECTION, EMULSION INTRAVENOUS at 10:08

## 2025-02-24 NOTE — ANESTHESIA PREPROCEDURE EVALUATION
Anesthesia Evaluation     Patient summary reviewed and Nursing notes reviewed   history of anesthetic complications:  PONV               Airway   Mallampati: II  Dental      Pulmonary - negative pulmonary ROS   Cardiovascular     ECG reviewed  Rhythm: regular  Rate: normal    (+) hyperlipidemia      Neuro/Psych- negative ROS  GI/Hepatic/Renal/Endo    (+) morbid obesity, liver disease fatty liver disease, renal disease-, thyroid problem hypothyroidism    Musculoskeletal (-) negative ROS    Abdominal    Substance History - negative use     OB/GYN negative ob/gyn ROS         Other                      Anesthesia Plan    ASA 3     MAC     intravenous induction     Anesthetic plan, risks, benefits, and alternatives have been provided, discussed and informed consent has been obtained with: patient.    CODE STATUS:

## 2025-02-24 NOTE — H&P
Cc: Endoscopy Visit    HPI: 74 y.o. female here for screening with history of endoscopically unresectable right colon tubulovilous adenoma with laparoscopy converted to open with bilateral oopherectomy.     Past Medical History:   Diagnosis Date    Colon polyp 1/09/2023    Colonic mass     Diverticulitis     Diverticulosis 1/09/2023    [place] (hard of hearing)     SIGNIFICANT-BILAT HEARING AIDS    Hydronephrosis     RIGHT    Hyperlipidemia     Hyperlipidemia 09/08/2016    Hypothyroidism 9/11 /2001    Thyroid removed    OA (osteoarthritis) of hip     PONV (postoperative nausea and vomiting)     Stress incontinence     Thyroid cancer     RADIOACTIVE IODINE    Thyroid nodule        Past Surgical History:   Procedure Laterality Date    CATARACT EXTRACTION, BILATERAL      COLON RESECTION N/A 02/09/2023    Procedure: laparoscopic right hemicolectomyconverted to open  bilateral ooperectomy;  Surgeon: Norma Barillas MD;  Location: [facility] OR;  Service: General;  Laterality: N/A;    COLON RESECTION N/A 02/22/2023    Procedure: DIAGNOSTIC LAPAROSCOPY WITH OPEN ILLEO-COLOIC RESECTION;  Surgeon: Norma Barillas MD;  Location: [facility] OR;  Service: General;  Laterality: N/A;    COLONOSCOPY N/A 01/09/2023    Procedure: COLONOSCOPY to cecum with biopsy of ileo cecal valve polyp;  Surgeon: Norma Barillas MD;  Location: [facility] ENDOSCOPY;  Service: General;  Laterality: N/A;  PRE - screening  POST - diverticulosis, ileo cecal valve polyp    CYSTOSCOPY W/ URETERAL STENT PLACEMENT Right 02/14/2023    Procedure: CYSTOSCOPY URETERAL CATHETER/STENT INSERTION, Retrograde Pyelogram;  Surgeon: Paul Antonio MD;  Location: [facility] OR;  Service: Urology;  Laterality: Right;    CYSTOSCOPY W/ URETERAL STENT PLACEMENT Right 03/29/2023    Procedure: CYSTOSCOPY , RIGHT STENT exchange, right ureteroscopy, RETROGRADE PYELOGRAM;  Surgeon: Paul Antonio MD;  Location: [facility] OR;  Service: Urology;  Laterality: Right;     EYE SURGERY      Cataracts    LAPAROSCOPIC TUBAL LIGATION      PARATHYROID GLAND SURGERY      ROTATOR CUFF REPAIR Left     STAPEDECTOMY Right     THYROIDECTOMY Bilateral     TUBAL ABDOMINAL LIGATION      URETEROSCOPY LASER LITHOTRIPSY WITH STENT INSERTION Left 05/19/2023    Procedure: CYSTOSCOPY, BIlateral retrogradesRETROGRADE PYELOGRAM, RIGHT stent removal;  Surgeon: Paul Antonio MD;  Location: [facility] OR;  Service: Urology;  Laterality: Left;       is allergic to blue dyes (parenteral), other, and simvastatin.       Medication List        ASK your doctor about these medications      calcitriol 0.5 MCG capsule  Commonly known as: ROCALTROL  TAKE 1 CAPSULE BY MOUTH DAILY     calcium carbonate 600 MG tablet  Commonly known as: OS-DIGNA     citalopram 20 MG tablet  Commonly known as: CeleXA  TAKE 1 TABLET BY MOUTH DAILY     dexAMETHasone 0.1 % ophthalmic solution  Commonly known as: DECADRON     famotidine 20 MG tablet  Commonly known as: PEPCID  Take 1 tablet by mouth At Night As Needed for Indigestion or Heartburn.     latanoprost 0.005 % ophthalmic solution  Commonly known as: XALATAN     levothyroxine 100 MCG tablet  Commonly known as: SYNTHROID, LEVOTHROID  Take 1 tablet by mouth Every Morning.     pravastatin 80 MG tablet  Commonly known as: PRAVACHOL  TAKE ONE TABLET BY MOUTH ONCE NIGHTLY              Family History   Problem Relation Age of Onset    Heart disease Mother         Late 70s    Cancer Sister     Ovarian cancer Sister     Cancer Maternal Grandmother     Breast cancer Maternal Grandmother     Heart disease Maternal Grandfather     Malig Hyperthermia Neg Hx        Social History     Socioeconomic History    Marital status:    Tobacco Use    Smoking status: Never    Smokeless tobacco: Never   Vaping Use    Vaping status: Never Used   Substance and Sexual Activity    Alcohol use: Yes     Comment: Occasionally    Drug use: Never    Sexual activity: Not Currently       Vitals:     02/24/25 0911   BP: 107/75   Pulse: 68   Resp: 16   SpO2: 95%       Body mass index is 33.75 kg/m².      Physical Exam    General: No acute distress  Lungs: No labored breathing, Pulse oximetry on room air is 95%.  Heart/EKG: RRR  Abdomen: no complaints of pain  Mental:  Awake, alert, and oriented    Imp:     Screening  history of endoscopically unresectable right colon tubulovilous adenoma with laparoscopy converted to open with bilateral oopherectomy.   Sister with ovarian cancer  Sister with breast cancer  MUTYH single allele mutation  History of thyroid cancer.   Partial SBO post op prompting open ileocolectomy     Plan:  C maricruz Barillas MD  10:03 EST

## 2025-02-24 NOTE — OP NOTE
Colonoscopy Procedure Note  Stefani Watson  1950  Date of Procedure: 02/24/25    Pre-operative Diagnosis:    Screening  history of endoscopically unresectable right colon tubulovilous adenoma with laparoscopy converted to open with bilateral oopherectomy.   Sister with ovarian cancer  Sister with breast cancer  MUTYH single allele mutation  History of thyroid cancer.   Partial SBO post op prompting open ileocolectomy    Post-operative Diagnosis:  Transverse colon polyp, 4 mm.  Removed via cold biopsy forceps  Anastomosis widely patent.    Few sigmoid diverticulosis    Procedure: Colonoscopy to the anastomosis with polypectomy        Recommendations:   Colonoscopy in 4 years likely, based on prior polyp and this one, as well as family history.   Keep a copy of the photographs of the procedure given to you today for possible need for reference in the future.    Surgeon: Eran    Anesthetic: MAC per Paul Crouch MD    Indications:  As above     Scope Withdrawal Time:  5 minutes  59 seconds    Procedure Details     MAC anesthesia was induced.  The 180 Colonoscopy was inserted blindly into the rectum and advanced to the anastomosis, with relative ease,  without need for pressure, lift, or turning.  The anastomosis was identified by the typical double barreled shot gun appearance and photographed for documentation.  There was a clean margin of the small bowel to the colon, without ulceration, with a widely patent opening.  The colonic stump was fairly short.      Prep quality was excellent.  A careful inspection was made as the scope was withdrawn, including a retroflexed view of the rectum; there was no suggestion of presence of angiodysplasias, or colitis, but there was the polyp and a few diverticula, with noted interventions.     Retroflexion in the rectum revealed no abnormalities.    Norma Barillas MD  02/24/25

## 2025-02-24 NOTE — ANESTHESIA POSTPROCEDURE EVALUATION
Patient: Stefani Watson    Procedure Summary       Date: 02/24/25 Room / Location: [facility] ENDOSCOPY 1 / [facility] ENDOSCOPY    Anesthesia Start: 1004 Anesthesia Stop: 1021    Procedure: COLONOSCOPY to anastamosis with cold polypectomy Diagnosis:       History of colon polyps      Encounter for screening colonoscopy      (History of colon polyps [Z86.0100])      (Encounter for screening colonoscopy [Z12.11])    Surgeons: Norma Barillas MD Provider: Paul Crouch MD    Anesthesia Type: MAC ASA Status: 3            Anesthesia Type: MAC    Vitals  Vitals Value Taken Time   BP     Temp     Pulse 62 02/24/25 1025   Resp     SpO2 95 % 02/24/25 1025   Vitals shown include unfiled device data.        Post Anesthesia Care and Evaluation    Patient location during evaluation: PACU  Patient participation: complete - patient participated  Level of consciousness: awake and alert  Pain management: adequate    Airway patency: patent  Anesthetic complications: No anesthetic complications    Cardiovascular status: acceptable  Respiratory status: acceptable  Hydration status: acceptable    Comments: --------------------            02/24/25               0911     --------------------   BP:       107/75     Pulse:      68       Resp:       16       SpO2:      95%      --------------------

## 2025-02-25 LAB
CYTO UR: NORMAL
LAB AP CASE REPORT: NORMAL
PATH REPORT.FINAL DX SPEC: NORMAL
PATH REPORT.GROSS SPEC: NORMAL

## 2025-02-25 NOTE — PROGRESS NOTES
Tash (endoscopy liaison),    Please call patient tto inform them of these findings and recommendations and ensure that any pamphlets that were to be given to the patient at the hospital were received.  Ensure that a letter is sent to the patient, recall method entered into the computer and the HM (Health Maintenance) section updated as to recommended endoscopy follow up.    Thanks  Dr Barillas    Colonoscopy Procedure Note  Stefani REJI Watson  1950  Date of Procedure: 02/24/25    Pre-operative Diagnosis:    · Screening  · history of endoscopically unresectable right colon tubulovilous adenoma with laparoscopy converted to open with bilateral oopherectomy.   · Sister with ovarian cancer  · Sister with breast cancer  · MUTYH single allele mutation  · History of thyroid cancer.   · Partial SBO post op prompting open ileocolectomy    Post-operative Diagnosis:  · Transverse colon polyp, 4 mm.  Removed via cold biopsy forceps;  TUBULAR ADENOMA  · Anastomosis widely patent.    · Few sigmoid diverticulosis    Procedure: Colonoscopy to the anastomosis with polypectomy     Recommendations:   · Colonoscopy in 4 years likely, based on prior polyp and this one, as well as family history. ;  C SCOPE 4 YEARS  · Keep a copy of the photographs of the procedure given to you today for possible need for reference in the future.

## 2025-03-04 ENCOUNTER — TELEPHONE (OUTPATIENT)
Dept: SURGERY | Facility: CLINIC | Age: 75
End: 2025-03-04
Payer: MEDICARE

## 2025-03-04 NOTE — TELEPHONE ENCOUNTER
----- Message from Norma Barillas sent at 2/25/2025 11:24 AM EST -----  Tash (endoscopy liaison),    Please call patient tto inform them of these findings and recommendations and ensure that any pamphlets that were to be given to the patient at the hospital were received.  Ensure that a letter is sent to the patient, recall method entered into the computer and the HM (Health Maintenance) section updated as to recommended endoscopy follow up.    Thanks  Dr Barillas    Colonoscopy Procedure Note  Stefani Watson  1950  Date of Procedure: 02/24/25     Pre-operative Diagnosis:    · Screening  · history of endoscopically unresectable right colon tubulovilous adenoma with laparoscopy converted to open with bilateral oopherectomy.   · Sister with ovarian cancer  · Sister with breast cancer  · MUTYH single allele mutation  · History of thyroid cancer.   · Partial SBO post op prompting open ileocolectomy     Post-operative Diagnosis:  · Transverse colon polyp, 4 mm.  Removed via cold biopsy forceps;  TUBULAR ADENOMA  · Anastomosis widely patent.    · Few sigmoid diverticulosis     Procedure: Colonoscopy to the anastomosis with polypectomy                                         Recommendations:   · Colonoscopy in 4 years likely, based on prior polyp and this one, as well as family history. ;  C SCOPE 4 YEARS  · Keep a copy of the photographs of the procedure given to you today for possible need for reference in the future.

## 2025-04-21 ENCOUNTER — OFFICE VISIT (OUTPATIENT)
Dept: ENDOCRINOLOGY | Age: 75
End: 2025-04-21
Payer: MEDICARE

## 2025-04-21 VITALS
WEIGHT: 213 LBS | BODY MASS INDEX: 34.23 KG/M2 | HEIGHT: 66 IN | OXYGEN SATURATION: 98 % | DIASTOLIC BLOOD PRESSURE: 84 MMHG | HEART RATE: 60 BPM | SYSTOLIC BLOOD PRESSURE: 132 MMHG

## 2025-04-21 DIAGNOSIS — E20.89 OTHER HYPOPARATHYROIDISM: ICD-10-CM

## 2025-04-21 DIAGNOSIS — E03.9 HYPOTHYROIDISM, UNSPECIFIED TYPE: ICD-10-CM

## 2025-04-21 PROCEDURE — 1160F RVW MEDS BY RX/DR IN RCRD: CPT | Performed by: INTERNAL MEDICINE

## 2025-04-21 PROCEDURE — 99214 OFFICE O/P EST MOD 30 MIN: CPT | Performed by: INTERNAL MEDICINE

## 2025-04-21 PROCEDURE — G2211 COMPLEX E/M VISIT ADD ON: HCPCS | Performed by: INTERNAL MEDICINE

## 2025-04-21 PROCEDURE — 1159F MED LIST DOCD IN RCRD: CPT | Performed by: INTERNAL MEDICINE

## 2025-04-21 RX ORDER — TOBRAMYCIN AND DEXAMETHASONE 3; 1 MG/ML; MG/ML
1 SUSPENSION/ DROPS OPHTHALMIC ONCE
COMMUNITY
Start: 2025-02-21

## 2025-04-21 RX ORDER — CALCITRIOL 0.5 UG/1
0.5 CAPSULE, LIQUID FILLED ORAL DAILY
Qty: 90 CAPSULE | Refills: 1 | Status: SHIPPED | OUTPATIENT
Start: 2025-04-21

## 2025-04-21 RX ORDER — LEVOTHYROXINE SODIUM 100 UG/1
100 TABLET ORAL
Qty: 90 TABLET | Refills: 1 | Status: SHIPPED | OUTPATIENT
Start: 2025-04-21

## 2025-04-21 NOTE — PROGRESS NOTES
Referring provider: No ref. provider found     Chief complaint/Reason for consult: hypothyroidism and hypoparathyroidism    HPI:   - 74 year old female here for hypothyroidism and hypoparathyroidism  - Was last seen in 10/2024  - No changes since last visit  - She had a total thyroidectomy 20 years ago for thyroid cancer  - She does not know what type of thyroid cancer she had  - Denies missing any does of her levothyroxine 100 mcg, calcium carbonate 600 mg bid, calcitriol 0.5 mcg daily, magnesium  - She takes her levothyroxine at the same time every day, on an empty stomach, and not with hot beverages  - She denies muscle cramps when she takes her calcium but she sometimes forgets       The following portions of the patient's history were reviewed and updated as appropriate: allergies, current medications, past family history, past medical history, past social history, past surgical history, and problem list.      Objective     Vitals:    04/21/25 0953   BP: 132/84   Pulse: 60   SpO2: 98%        Physical Exam  Vitals reviewed.   Constitutional:       Appearance: Normal appearance.   HENT:      Head: Normocephalic and atraumatic.   Eyes:      General: No scleral icterus.  Pulmonary:      Effort: Pulmonary effort is normal. No respiratory distress.   Neurological:      Mental Status: She is alert.      Gait: Gait normal.   Psychiatric:         Mood and Affect: Mood normal.         Behavior: Behavior normal.         Thought Content: Thought content normal.         Judgment: Judgment normal.         Assessment & Plan   Hypothyroidism  2.   History of thyroid cancer  3.   Hypoparathyroidism  - Currently on levothyroxine 100 mcg, calcium carbonate 600 mg bid, calcitriol 0.5 mcg daily, magnesium (she does not know the dose)  - No symptoms of hypocalcemia  - I do not have any information on her thyroid cancer  - Will check TSH, free T4, Tg, BMP     - Return to clinic in 6 months

## 2025-06-09 RX ORDER — PRAVASTATIN SODIUM 80 MG/1
80 TABLET ORAL NIGHTLY
Qty: 90 TABLET | Refills: 2 | Status: SHIPPED | OUTPATIENT
Start: 2025-06-09

## 2025-06-11 ENCOUNTER — OFFICE VISIT (OUTPATIENT)
Dept: INTERNAL MEDICINE | Facility: CLINIC | Age: 75
End: 2025-06-11
Payer: MEDICARE

## 2025-06-11 VITALS
HEIGHT: 66 IN | BODY MASS INDEX: 33.01 KG/M2 | OXYGEN SATURATION: 97 % | SYSTOLIC BLOOD PRESSURE: 109 MMHG | HEART RATE: 71 BPM | DIASTOLIC BLOOD PRESSURE: 72 MMHG | WEIGHT: 205.4 LBS

## 2025-06-11 DIAGNOSIS — R21 RASH: Primary | ICD-10-CM
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
## 2025-06-11 DIAGNOSIS — D12.2 ADENOMATOUS POLYP OF ASCENDING COLON: ICD-10-CM

## 2025-06-11 DIAGNOSIS — K44.9 HIATAL HERNIA: ICD-10-CM

## 2025-06-11 DIAGNOSIS — R07.89 CHEST DISCOMFORT: ICD-10-CM

## 2025-06-11 RX ORDER — HYDROCORTISONE 25 MG/G
1 CREAM TOPICAL 2 TIMES DAILY
Qty: 60 G | Refills: 0 | Status: SHIPPED | OUTPATIENT
Start: 2025-06-11

## 2025-06-11 NOTE — PATIENT INSTRUCTIONS
Dermatologists    [facility]  [address] [place] [address] [phone]    [facility]  [address]  [place], [place] [phone]    Dr. Alannah Joe  [address] [place] [address] [phone]    Dr. Uzma Morin  [address] [address] [phone]

## 2025-06-11 NOTE — PROGRESS NOTES
Kaushik Watters M.D.  Internal Medicine  [facility]  [address], [address]  [place] [address]  [phone]      Chief Complaint  Hyperlipidemia, Prediabetes, and Rash (Having rash and hives last few weeks /)    SUBJECTIVE  Stefani Watson is a 74 y.o. female  with hypothyroidism, depression, hyperlipidemia, hepatic steatosis who presents to the office today as an established patient that last saw me on 12/10/2024.     History of Present Illness    She tripped over a curb and hurt her hand, knee, and thumb. She didn't hit her head. She doesn't usually have balance issues but has arthritis in her hip, which makes it hard to move quickly after sitting for a long time. This is the first time she's fallen, and she usually walks every day without problems. She has done physical therapy before and still does the exercises she was given.    She has had a rash for 2-3 weeks, which she thinks might be hives because she had something similar a few years ago. The rash shows up in different places and she didn't take any medication this morning so the doctor could see it. A previous doctor thought she might be allergic to penicillin, and she's wondering if the eardrops she's using have penicillin in them. She had ear surgery a month ago and has been using eardrops for 4 weeks. She hasn't changed her detergent, shampoo, or soap. She spends time outside walking and gardening but doesn't have pets. She uses latanoprost eyedrops for glaucoma and wonders if they might be causing the rash. The rash is noticeable when she wakes up and she manages it with over-the-counter antihistamines and anti-itch cream.    She was diagnosed with a hiatal hernia during pre-surgical testing and has been feeling heaviness in her chest, which she thinks might be due to the hernia. The symptoms get worse with coffee or certain foods and sometimes spread to her back. This has been happening for a couple of weeks and isn't  related to exercise. Taking aspirin and antacids helps, and the symptoms usually go away in 15-20 minutes. She also feels tightness when wearing a bra.    She has high cholesterol and is taking medicine for it, along with CoQ10. She had a colonoscopy with Dr. Barillas recently, and everything was fine. She is scheduled for another one in 4 years.    PAST SURGICAL HISTORY:  Ear surgery (approximately one month ago)  Ear surgery (over a year ago)    Rash  Chronicity:  New  Onset:  1 to 4 weeks ago  Progression since onset:  Coming and going  Affected locations:  Chest, torso, left axilla, left arm, right arm and right foot  Characteristics:  Redness and swelling  Exposed to:  A new medication  Associated symptoms: no anorexia, no congestion, no cough, no diarrhea, no facial edema, no fatigue, no fever, no joint pain, no nail changes, no rhinorrhea, no shortness of breath, no sore throat and no vomiting      Review of Systems   Constitutional:  Negative for fatigue and fever.   HENT:  Negative for congestion, rhinorrhea and sore throat.    Respiratory:  Negative for cough and shortness of breath.    Gastrointestinal:  Negative for anorexia, diarrhea and vomiting.   Musculoskeletal:  Negative for joint pain.   Skin:  Positive for rash. Negative for nail changes.     Allergies   Allergen Reactions    Blue Dyes (Parenteral) Rash    Other Itching and Rash     CHG wipes    Simvastatin Myalgia        Outpatient Medications Marked as Taking for the 6/11/25 encounter (Office Visit) with Kaushik Watters MD   Medication Sig Dispense Refill    calcitriol (ROCALTROL) 0.5 MCG capsule Take 1 capsule by mouth Daily. 90 capsule 1    calcium carbonate (OS-DIGNA) 600 MG tablet Take 2 tablets by mouth Daily.      citalopram (CeleXA) 20 MG tablet TAKE 1 TABLET BY MOUTH DAILY 90 tablet 3        Past Medical History:   Diagnosis Date    Cataract     Colon polyp 1/09/2023    Colonic mass     Diverticulitis     Diverticulosis 1/09/2023    Glaucoma  2021    [place] (hard of hearing)     SIGNIFICANT-BILAT HEARING AIDS    Hydronephrosis     RIGHT    Hyperlipidemia     Hyperlipidemia 09/08/2016    Hypothyroidism 9/11 /2001    Thyroid removed    OA (osteoarthritis) of hip     Obesity     PONV (postoperative nausea and vomiting)     Stress incontinence     Thyroid cancer     RADIOACTIVE IODINE    Thyroid nodule      Past Surgical History:   Procedure Laterality Date    CATARACT EXTRACTION, BILATERAL      COLON RESECTION N/A 02/09/2023    Procedure: laparoscopic right hemicolectomyconverted to open  bilateral ooperectomy;  Surgeon: Norma Barillas MD;  Location: [facility] MAIN OR;  Service: General;  Laterality: N/A;    COLON RESECTION N/A 02/22/2023    Procedure: DIAGNOSTIC LAPAROSCOPY WITH OPEN ILLEO-COLOIC RESECTION;  Surgeon: Norma Barillas MD;  Location: [facility] MAIN OR;  Service: General;  Laterality: N/A;    COLONOSCOPY N/A 01/09/2023    Procedure: COLONOSCOPY to cecum with biopsy of ileo cecal valve polyp;  Surgeon: Norma Barillas MD;  Location: [facility] ENDOSCOPY;  Service: General;  Laterality: N/A;  PRE - screening  POST - diverticulosis, ileo cecal valve polyp    COLONOSCOPY N/A 02/24/2025    Procedure: COLONOSCOPY to anastamosis with cold polypectomy;  Surgeon: Norma Barillas MD;  Location: [facility] ENDOSCOPY;  Service: General;  Laterality: N/A;  history of colon polyps//colon polyp, diverticulosis    CYSTOSCOPY W/ URETERAL STENT PLACEMENT Right 02/14/2023    Procedure: CYSTOSCOPY URETERAL CATHETER/STENT INSERTION, Retrograde Pyelogram;  Surgeon: Paul Antonio MD;  Location: [facility] MAIN OR;  Service: Urology;  Laterality: Right;    CYSTOSCOPY W/ URETERAL STENT PLACEMENT Right 03/29/2023    Procedure: CYSTOSCOPY , RIGHT STENT exchange, right ureteroscopy, RETROGRADE PYELOGRAM;  Surgeon: Paul Antonio MD;  Location: [facility] MAIN OR;  Service: Urology;  Laterality: Right;    EYE SURGERY      Cataracts    INGUINAL HERNIA REPAIR      LAPAROSCOPIC TUBAL  "LIGATION      PARATHYROID GLAND SURGERY      ROTATOR CUFF REPAIR Left     STAPEDECTOMY Right     THYROIDECTOMY Bilateral     TUBAL ABDOMINAL LIGATION      URETEROSCOPY LASER LITHOTRIPSY WITH STENT INSERTION Left 05/19/2023    Procedure: CYSTOSCOPY, BIlateral retrogradesRETROGRADE PYELOGRAM, RIGHT stent removal;  Surgeon: Paul Antonio MD;  Location: [facility];  Service: Urology;  Laterality: Left;     Family History   Problem Relation Age of Onset    Heart disease Mother         Late 70s    Cancer Sister         Ovarian    Ovarian cancer Sister     Cancer Maternal Grandmother         Breast cancer    Breast cancer Maternal Grandmother     Heart disease Maternal Grandfather     Malig Hyperthermia Neg Hx     reports that she has never smoked. She has never used smokeless tobacco. She reports current alcohol use. She reports that she does not use drugs.    OBJECTIVE    Vital Signs:   /72   Pulse 71   Ht 167.6 cm (65.98\")   Wt 93.2 kg (205 lb 6.4 oz)   SpO2 97%   BMI 33.17 kg/m²     Physical Exam  Constitutional:       Appearance: Normal appearance.   Cardiovascular:      Rate and Rhythm: Normal rate and regular rhythm.      Heart sounds: Normal heart sounds. No murmur heard.  Pulmonary:      Effort: Pulmonary effort is normal.      Breath sounds: Normal breath sounds.   Musculoskeletal:      Right lower leg: No edema.      Left lower leg: No edema.   Skin:     General: Skin is warm and dry.      Findings: Rash present.   Neurological:      Mental Status: She is alert.   Psychiatric:         Behavior: Behavior normal.          Physical Exam      The following data was reviewed by: Kaushik Watters MD on 06/11/2025:  CMP          10/24/2024    11:40 12/5/2024    10:34 1/2/2025    10:12   CMP   Glucose 85  87  95    BUN 20  15  13    Creatinine 1.11  1.12  1.06    EGFR 52  51.1  54.6    Sodium 141  140  137    Potassium 4.6  4.3  4.2    Chloride 102  103  103    Calcium 8.7  7.9  7.7    Total Protein  " 6.6     Albumin  4.0  3.9    Globulin  2.6     Total Bilirubin  0.4     Alkaline Phosphatase  90     AST (SGOT)  14     ALT (SGPT)  9     Albumin/Globulin Ratio  1.5     BUN/Creatinine Ratio 18  13.4  12.3    Anion Gap   9.3      CBC w/diff          12/5/2024    10:34   CBC w/Diff   WBC 5.93    RBC 4.75    Hemoglobin 13.8    Hematocrit 42.0    MCV 88.4    MCH 29.1    MCHC 32.9    RDW 12.7    Platelets 290    Neutrophil Rel % 61.5    Lymphocyte Rel % 29.5    Monocyte Rel % 7.8    Eosinophil Rel % 0.0    Basophil Rel % 1.0      Lipid Panel          12/5/2024    10:34   Lipid Panel   Total Cholesterol 209    Triglycerides 96    HDL Cholesterol 47    VLDL Cholesterol 17    LDL Cholesterol  145      TSH          10/24/2024    11:40 12/5/2024    10:34 1/2/2025    10:12   TSH   TSH 0.346  0.562  1.270      A1C Last 3 Results          12/5/2024    10:34   HGBA1C Last 3 Results   Hemoglobin A1C 5.70                ASSESSMENT & PLAN        Rash  - states she has hisotry of hives. It is itchy. Taking over the pill for hives and anti-itch cream. She is concerned this is related to medication (ear drops).  - no new soaps or detergents  - no known exposures. Walks outdoors. No pets.   - Continue non-drowsy antihistamines like Claritin or Allegra.  - OTC hydrocortisone cream prescribed.  - Keep skin hydrated with Aquaphor or Eucerin, avoid hot showers, avoid scented products.  - Cool compresses recommended, wear loose-fitting clothing.  - List of dermatologists provided for follow-up if condition does not improve.  - Oral steroid may be considered if itching persists after a week.    Orders:    hydrocortisone 2.5 % cream; Apply 1 Application topically to the appropriate area as directed 2 (Two) Times a Day.    Adenomatous polyp of ascending colon  history of endoscopically unresectable right colon tubulovilous adenoma with laparoscopy converted to open with bilateral oopherectomy and partial SBO post op prompting open  ileocolectomy.  She was advised colonoscopy in 4 years.       Fall, initial encounter  - Tripped over a curb, minor injuries to hand, knee, and thumb. No head injury.  - Able to move fingers and hand, some stiffness and soreness.  - X-ray offered but deemed unnecessary.  - Monitor thumb, knee, and elbow for changes, report if symptoms do not improve.       Hiatal hernia  - chest feels heavy after drinking coffee for a few weeks. Feels in chest and her back. Notices sitting in chair in AM. States worse if she drinks more coffee.   - Relieved in 15 minutes with Pepcid  - better if she removes her bra  - no dypnea on exertion  - takes ASA and Pepcid with relief         Chest discomfort  - Symptoms suggest reflux, higher risk of cardiac involvement due to age and cholesterol history.  - Treadmill stress test ordered.  - Continue Pepcid for reflux management.  - Seek immediate medical attention if condition worsens or does not resolve with Pepcid.    Orders:    Treadmill Stress test; Future      Assessment & Plan        Health Maintenance Due   Topic Date Due    TDAP/TD VACCINES (1 - Tdap) Never done    COVID-19 Vaccine (8 - 2024-25 season) 03/11/2025        Follow Up  No follow-ups on file.    Patient/family had no further questions at this time and verbalized understanding of the plan discussed today.     Patient or patient representative verbalized consent for the use of Ambient Listening during the visit with  Kaushik Watters MD for chart documentation. 6/11/2025  12:23 EDT

## 2025-07-14 ENCOUNTER — HOSPITAL ENCOUNTER (OUTPATIENT)
Dept: CARDIOLOGY | Facility: HOSPITAL | Age: 75
Discharge: HOME | End: 2025-07-14
Admitting: STUDENT IN AN ORGANIZED HEALTH CARE EDUCATION/TRAINING PROGRAM
Payer: MEDICARE

## 2025-07-14 DIAGNOSIS — R07.89 CHEST DISCOMFORT: ICD-10-CM

## 2025-07-14 LAB
BH CV STRESS DURATION MIN STAGE 1: 3
BH CV STRESS DURATION SEC STAGE 1: 0
BH CV STRESS GRADE STAGE 1: 10
BH CV STRESS METS STAGE 1: 5
BH CV STRESS PROTOCOL 1: NORMAL
BH CV STRESS RECOVERY BP: NORMAL MMHG
BH CV STRESS RECOVERY HR: 75 BPM
BH CV STRESS SPEED STAGE 1: 1.7
BH CV STRESS STAGE 1: 1
MAXIMAL PREDICTED HEART RATE: 144 BPM
PERCENT MAX PREDICTED HR: 95.83 %
STRESS BASELINE BP: NORMAL MMHG
STRESS BASELINE HR: 68 BPM
STRESS PERCENT HR: 113 %
STRESS POST ESTIMATED WORKLOAD: 7.2 METS
STRESS POST EXERCISE DUR MIN: 6 MIN
STRESS POST EXERCISE DUR SEC: 30 SEC
STRESS POST PEAK BP: NORMAL MMHG
STRESS POST PEAK HR: 138 BPM
STRESS TARGET HR: 122 BPM

## 2025-07-14 PROCEDURE — 93016 CV STRESS TEST SUPVJ ONLY: CPT | Performed by: INTERNAL MEDICINE

## 2025-07-14 PROCEDURE — 93017 CV STRESS TEST TRACING ONLY: CPT

## 2025-07-14 PROCEDURE — 93018 CV STRESS TEST I&R ONLY: CPT | Performed by: INTERNAL MEDICINE

## 2025-07-15 ENCOUNTER — RESULTS FOLLOW-UP (OUTPATIENT)
Dept: INTERNAL MEDICINE | Facility: CLINIC | Age: 75
End: 2025-07-15
Payer: MEDICARE

## 2025-07-15 NOTE — TELEPHONE ENCOUNTER
I called Stefani MENDOZA [place] at [phone] at 16:55 EDT     I left voicemail.  No signs of ischemia.  She did have reduced exercise capacity.

## 2025-08-07 ENCOUNTER — PATIENT MESSAGE (OUTPATIENT)
Dept: INTERNAL MEDICINE | Facility: CLINIC | Age: 75
End: 2025-08-07
Payer: MEDICARE

## 2025-08-07 DIAGNOSIS — M25.559 HIP PAIN, UNSPECIFIED LATERALITY: Primary | ICD-10-CM

## 2025-08-19 ENCOUNTER — OFFICE VISIT (OUTPATIENT)
Dept: ORTHOPEDIC SURGERY | Facility: CLINIC | Age: 75
End: 2025-08-19
Payer: MEDICARE

## 2025-08-19 VITALS — BODY MASS INDEX: 33.56 KG/M2 | HEIGHT: 66 IN | WEIGHT: 208.8 LBS | TEMPERATURE: 98.6 F

## 2025-08-19 DIAGNOSIS — M70.62 TROCHANTERIC BURSITIS OF BOTH HIPS: Primary | ICD-10-CM

## 2025-08-19 DIAGNOSIS — M25.552 BILATERAL HIP PAIN: ICD-10-CM

## 2025-08-19 DIAGNOSIS — M25.551 BILATERAL HIP PAIN: ICD-10-CM

## 2025-08-19 DIAGNOSIS — M70.61 TROCHANTERIC BURSITIS OF BOTH HIPS: Primary | ICD-10-CM

## 2025-08-19 DIAGNOSIS — M16.11 PRIMARY OSTEOARTHRITIS OF RIGHT HIP: ICD-10-CM

## 2025-08-19 RX ORDER — LIDOCAINE HYDROCHLORIDE 10 MG/ML
2 INJECTION, SOLUTION EPIDURAL; INFILTRATION; INTRACAUDAL; PERINEURAL
Status: COMPLETED | OUTPATIENT
Start: 2025-08-19 | End: 2025-08-19

## 2025-08-19 RX ORDER — METHYLPREDNISOLONE ACETATE 40 MG/ML
80 INJECTION, SUSPENSION INTRA-ARTICULAR; INTRALESIONAL; INTRAMUSCULAR; SOFT TISSUE
Status: COMPLETED | OUTPATIENT
Start: 2025-08-19 | End: 2025-08-19

## 2025-08-19 RX ADMIN — METHYLPREDNISOLONE ACETATE 80 MG: 40 INJECTION, SUSPENSION INTRA-ARTICULAR; INTRALESIONAL; INTRAMUSCULAR; SOFT TISSUE at 09:29

## 2025-08-19 RX ADMIN — LIDOCAINE HYDROCHLORIDE 2 ML: 10 INJECTION, SOLUTION EPIDURAL; INFILTRATION; INTRACAUDAL; PERINEURAL at 09:29

## 2025-08-22 ENCOUNTER — PATIENT ROUNDING (BHMG ONLY) (OUTPATIENT)
Dept: ORTHOPEDIC SURGERY | Facility: CLINIC | Age: 75
End: 2025-08-22
Payer: MEDICARE

## (undated) DEVICE — DISPOSABLE GRASPER CARTRIDGE: Brand: DIRECT DRIVE REPOSABLE GRASPERS

## (undated) DEVICE — GLV SURG BIOGEL LTX PF 6 1/2

## (undated) DEVICE — ENDOCUT SCISSOR TIP, DISPOSABLE: Brand: RENEW

## (undated) DEVICE — ENDOPATH XCEL UNIVERSAL TROCAR STABLILITY SLEEVES: Brand: ENDOPATH XCEL

## (undated) DEVICE — POOLE SUCTION HANDLE: Brand: CARDINAL HEALTH

## (undated) DEVICE — PENCL ES MEGADINE EZ/CLEAN BUTN W/HOLSTR 10FT

## (undated) DEVICE — SUT VIC 2/0 TIES 18IN J111T

## (undated) DEVICE — SUT VIC 3/0 CT2 27IN J232H

## (undated) DEVICE — LOU CYSTO: Brand: MEDLINE INDUSTRIES, INC.

## (undated) DEVICE — LABEL SHEET CUSTOM 2X2 YELLOW: Brand: MEDLINE INDUSTRIES, INC.

## (undated) DEVICE — ENSEAL TRIO TEMPERATURE CONTOLLED TISSUE SEALING TECHNOLOGY DISPOSABLE TISSUE SEALING DEVICE TAPTRONIC TRIGGER ACTIVATED POWER 3MM CURVED JAW: Brand: ENSEAL

## (undated) DEVICE — STPLR SKIN VISISTAT WD 35CT

## (undated) DEVICE — TUBING, SUCTION, 1/4" X 20', STRAIGHT: Brand: MEDLINE INDUSTRIES, INC.

## (undated) DEVICE — TUBING, SUCTION, 1/4" X 10', STRAIGHT: Brand: MEDLINE

## (undated) DEVICE — LAPAROSCOPIC SMOKE FILTRATION SYSTEM: Brand: PALL LAPAROSHIELD® PLUS LAPAROSCOPIC SMOKE FILTRATION SYSTEM

## (undated) DEVICE — SUT VIC 5/0 PS2 18IN J495H

## (undated) DEVICE — MSK ENDO PORT O2 POM ELITE CURAPLEX A/

## (undated) DEVICE — GLV SURG BIOGEL M LTX PF 6 1/2

## (undated) DEVICE — PRT BIOP SEALS

## (undated) DEVICE — SENSR O2 OXIMAX FNGR A/ 18IN NONSTR

## (undated) DEVICE — 2, DISPOSABLE SUCTION/IRRIGATOR WITH DISPOSABLE TIP: Brand: STRYKEFLOW

## (undated) DEVICE — LN SMPL CO2 SHTRM SD STREAM W/M LUER

## (undated) DEVICE — PK URETSCP 40

## (undated) DEVICE — ENDOPATH PNEUMONEEDLE INSUFFLATION NEEDLES WITH LUER LOCK CONNECTORS 120MM: Brand: ENDOPATH

## (undated) DEVICE — APPL CHLORAPREP HI/LITE 26ML ORNG

## (undated) DEVICE — SOL NACL 0.9PCT 1000ML

## (undated) DEVICE — NITINOL WIRE WITH HYDROPHILIC TIP: Brand: SENSOR

## (undated) DEVICE — TISSUE RETRIEVAL SYSTEM: Brand: INZII RETRIEVAL SYSTEM

## (undated) DEVICE — TROCAR: Brand: KII OPTICAL ACCESS SYSTEM

## (undated) DEVICE — SUT VIC 0 TIES 18IN J912G

## (undated) DEVICE — CATH URETRL FLXITP POLLACK STD 5F 70CM

## (undated) DEVICE — TROCAR: Brand: KII FIOS FIRST ENTRY

## (undated) DEVICE — ENDOPATH XCEL BLADELESS TROCARS WITH STABILITY SLEEVES: Brand: ENDOPATH XCEL

## (undated) DEVICE — ELECTRD BLD EZ CLN MOD 6.5IN

## (undated) DEVICE — TTL1LYR 16FR10ML 100%SIL TMPST TR: Brand: MEDLINE

## (undated) DEVICE — TIDISHIELD UROLOGY DRAIN BAGS FROSTY VINYL STERILE FITS SIEMENS UROSKOP ACCESS 20 PER CASE: Brand: TIDISHIELD

## (undated) DEVICE — TOWEL,OR,DSP,ST,BLUE,STD,4/PK,20PK/CS: Brand: MEDLINE

## (undated) DEVICE — MEDI-VAC YANKAUER SUCTION HANDLE W/BULBOUS TIP: Brand: CARDINAL HEALTH

## (undated) DEVICE — SUT PDS 0 CT1 36IN Z346H

## (undated) DEVICE — KT ORCA ORCAPOD DISP STRL

## (undated) DEVICE — GLV SURG BIOGEL LTX PF 7

## (undated) DEVICE — ADAPT CLN BIOGUARD AIR/H2O DISP

## (undated) DEVICE — DISPOSABLE MONOPOLAR ENDOSCOPIC CORD 10 FT. (3M): Brand: KIRWAN

## (undated) DEVICE — TRAP FLD MINIVAC MEGADYNE 100ML

## (undated) DEVICE — LOU LAP SIGMOID COLON: Brand: MEDLINE INDUSTRIES, INC.

## (undated) DEVICE — SUT VIC 0 TN 27IN DYED JTN0G

## (undated) DEVICE — PREP SOL POVIDONE/IODINE BT 4OZ

## (undated) DEVICE — WOUND RETRACTOR AND PROTECTOR: Brand: ALEXIS WOUND PROTECTOR-RETRACTOR

## (undated) DEVICE — LAPAROVUE VISIBILITY SYSTEM LAPAROSCOPIC SOLUTIONS: Brand: LAPAROVUE

## (undated) DEVICE — ANTIBACTERIAL UNDYED BRAIDED (POLYGLACTIN 910), SYNTHETIC ABSORBABLE SUTURE: Brand: COATED VICRYL

## (undated) DEVICE — SINGLE-USE BIOPSY FORCEPS: Brand: RADIAL JAW 4

## (undated) DEVICE — ELECTRD BLD EZ CLN MOD XLNG 2.75IN

## (undated) DEVICE — SUT VIC 3/0 TIES 18IN J110T

## (undated) DEVICE — ECHELON FLEX POWERED PLUS ARTICULATING ENDOSCOPIC LINEAR CUTTER , 60MM: Brand: ECHELON FLEX

## (undated) DEVICE — CONTAINER,SPECIMEN,OR STERILE,4OZ: Brand: MEDLINE

## (undated) DEVICE — CANN O2 ETCO2 FITS ALL CONN CO2 SMPL A/ 7IN DISP LF